# Patient Record
Sex: MALE | Race: BLACK OR AFRICAN AMERICAN | Employment: OTHER | URBAN - METROPOLITAN AREA
[De-identification: names, ages, dates, MRNs, and addresses within clinical notes are randomized per-mention and may not be internally consistent; named-entity substitution may affect disease eponyms.]

---

## 2017-01-18 DIAGNOSIS — Z85.46 HISTORY OF PROSTATE CANCER: Primary | ICD-10-CM

## 2017-01-18 NOTE — TELEPHONE ENCOUNTER
TAMSULOSIN 0.4MG 10/30/15         Last Written Prescription Date: 10/30/16  Last Fill Quantity: 90, # refills: 4    Last Office Visit with [facility], [facility] or [facility] prescribing provider:  8/17/16   Future Office Visit:      BP Readings from Last 3 Encounters:   10/31/16 138/70   08/17/16 120/60   06/15/16 114/66

## 2017-01-20 RX ORDER — TAMSULOSIN HYDROCHLORIDE 0.4 MG/1
CAPSULE ORAL
Qty: 90 CAPSULE | Refills: 1 | Status: SHIPPED | OUTPATIENT
Start: 2017-01-20 | End: 2017-05-30

## 2017-02-06 DIAGNOSIS — E78.5 HYPERLIPIDEMIA LDL GOAL <130: Primary | ICD-10-CM

## 2017-02-06 NOTE — TELEPHONE ENCOUNTER
simvastatin (ZOCOR) 20 MG tablet     Last Written Prescription Date: 11/7/16  Last Fill Quantity: 90, # refills: 3  Last Office Visit with [facility], [facility] or [facility] prescribing provider: 8/17/16       CHOL      110   8/20/2016  HDL       36   8/20/2016  LDL       43   8/20/2016  LDL     52.8   10/18/2011  TRIG      156   8/20/2016  CHOLHDLRATIO      3.3   10/30/2015

## 2017-02-07 RX ORDER — SIMVASTATIN 20 MG
20 TABLET ORAL AT BEDTIME
Qty: 90 TABLET | Refills: 1 | Status: SHIPPED | OUTPATIENT
Start: 2017-02-07 | End: 2017-12-27

## 2017-05-09 DIAGNOSIS — I50.22 CHRONIC SYSTOLIC CONGESTIVE HEART FAILURE (H): ICD-10-CM

## 2017-05-09 RX ORDER — TORSEMIDE 10 MG/1
10 TABLET ORAL DAILY
Qty: 90 TABLET | Refills: 1 | Status: SHIPPED | OUTPATIENT
Start: 2017-05-09 | End: 2018-08-27

## 2017-05-09 NOTE — TELEPHONE ENCOUNTER
Received refill request for:  torsemide  Last OV was: 10/31/2016 with Dr. Mcclain  Labs/EKG: last BMP 8/20/2016  F/U scheduled: orders in Epic for 10/2017  New script sent to: [facility]

## 2017-05-30 ENCOUNTER — OFFICE VISIT (OUTPATIENT)
Dept: CARDIOLOGY | Facility: CLINIC | Age: 82
End: 2017-05-30
Payer: COMMERCIAL

## 2017-05-30 VITALS
DIASTOLIC BLOOD PRESSURE: 70 MMHG | OXYGEN SATURATION: 99 % | HEART RATE: 70 BPM | HEIGHT: 68 IN | SYSTOLIC BLOOD PRESSURE: 118 MMHG | WEIGHT: 170.8 LBS | BODY MASS INDEX: 25.88 KG/M2

## 2017-05-30 DIAGNOSIS — I50.22 CHRONIC SYSTOLIC CONGESTIVE HEART FAILURE (H): ICD-10-CM

## 2017-05-30 DIAGNOSIS — I50.22 CHRONIC SYSTOLIC HEART FAILURE (H): Primary | ICD-10-CM

## 2017-05-30 LAB
ANION GAP SERPL CALCULATED.3IONS-SCNC: 14.9 MMOL/L (ref 6–17)
BUN SERPL-MCNC: 14 MG/DL (ref 7–30)
CALCIUM SERPL-MCNC: 9.1 MG/DL (ref 8.5–10.5)
CHLORIDE SERPL-SCNC: 106 MMOL/L (ref 98–107)
CO2 SERPL-SCNC: 25 MMOL/L (ref 23–29)
CREAT SERPL-MCNC: 1.22 MG/DL (ref 0.7–1.3)
GFR SERPL CREATININE-BSD FRML MDRD: 56 ML/MIN/1.7M2
GLUCOSE SERPL-MCNC: 144 MG/DL (ref 70–105)
POTASSIUM SERPL-SCNC: 3.9 MMOL/L (ref 3.5–5.1)
SODIUM SERPL-SCNC: 142 MMOL/L (ref 136–145)

## 2017-05-30 PROCEDURE — 99214 OFFICE O/P EST MOD 30 MIN: CPT | Performed by: NURSE PRACTITIONER

## 2017-05-30 PROCEDURE — 80048 BASIC METABOLIC PNL TOTAL CA: CPT | Performed by: NURSE PRACTITIONER

## 2017-05-30 PROCEDURE — 36415 COLL VENOUS BLD VENIPUNCTURE: CPT | Performed by: NURSE PRACTITIONER

## 2017-05-30 NOTE — PROGRESS NOTES
HISTORY OF PRESENT ILLNESS:  Sylvester Dvais is an 85-year-old gentleman who is here today for a 6-month followup of his congestive heart failure.  He has a history of:   1.  Paroxysmal atrial fibrillation.   2   Heart failure with preserved ejection fraction   3.  Intracerebral bleed, unable to use warfarin therapy for thromboembolic prophylaxis.   4.  History of syncopal episode thought to be due to bradycardia, but pacemaker implantation was not pursued due to the patient's declining memory.        Today I am seeing the patient in clinic as the son wanted the patient to be seen in a 6 month period. Today, Sylvester displayed short-term memory problems.  He believes he still lives in [place].  He believes that he is still living with his wife as his wife has  7 years ago and he has been living with his son for all that time.  Today both Sylvester and his son deny any worsening shortness of breath.  Sylvester is able to take a flight of stairs from his bedroom to the bathroom without any worsening shortness of breath.  Likewise, he denies chest pain or chest pressure.  There has been no more syncopal episodes.  Sylvester denies any new lower ankle swelling.  His son reports that his father is able to sleep with the head of the bed down without any breathlessness.      VITAL SIGNS:  Blood pressure in our office today looks stable at 118/70, pulse 70, weight is 170 which is stable but down 12 pounds, 11 pounds from a year ago.      LABORATORY STUDIES:  Lab work done today:  Sodium 142, potassium 3.9, BUN 14, creatinine 1.2 which is stable.      PHYSICAL EXAMINATION:   GENERAL:  The patient is alert, oriented to person.   CARDIAC:  Heart tones are irregular with a rate of 70 beats per minute.   LUNGS:  Clear without crackles or wheezes.   NECK:  No jugular venous distention.  Negative for carotid bruits.   ABDOMEN:  Soft.   LOWER EXTREMITIES:  Trace bilateral lower ankle edema.      IMPRESSION:     1.  The patient  with stable paroxysmal atrial fibrillation and flutter.  He has a CHADS-VASc score of greater than 4 which puts him at high risk for thromboembolic complications from the atrial fibrillation.  However, anticoagulation therapy has been essentially contraindicated due to his intracerebral bleed.  He will continue with low-dose aspirin   2.  Moderate aortic stenosis.  He remains asymptomatic with this.   3.  History of hypertension.  His blood pressure looks well managed.   4.  Hyperlipidemia.  His lipids are being followed by his primary care physician.  He will be due in 2017.        The Watchman device may be an alternative for this patient to decrease his risk for thromboembolic complications, but his son is not interested in pursuing this which seems very reasonable.  I will set Medardo up to be followed with Dr. Mcclain in the fall of .  It has been my pleasure to be involved in his care.         FARHAN العراقي, CNP             D: 2017 14:47   T: 2017 15:50   MT: BEST      Name:     LEONCIO CORDERO   MRN:      [id number]        Account:      [id number]   :      [id number]           Service Date: 2017      Document: [id number]

## 2017-05-30 NOTE — PROGRESS NOTES
HPI and Plan:   See dictation    No orders of the defined types were placed in this encounter.      Orders Placed This Encounter   Medications     OXYBUTYNIN CHLORIDE PO     Sig: Take 5 mg by mouth daily       Medications Discontinued During This Encounter   Medication Reason     memantine (NAMENDA) 10 MG tablet Erroneous Entry     oxybutynin (DITROPAN) 5 MG tablet Dose adjustment     oxybutynin (DITROPAN) 5 MG tablet Dose adjustment     tamsulosin (FLOMAX) 0.4 MG capsule Erroneous Entry         Encounter Diagnosis   Name Primary?     Chronic systolic heart failure (H) Yes       CURRENT MEDICATIONS:  Current Outpatient Prescriptions   Medication Sig Dispense Refill     OXYBUTYNIN CHLORIDE PO Take 5 mg by mouth daily       torsemide (DEMADEX) 10 MG tablet Take 1 tablet (10 mg) by mouth daily 90 tablet 1     simvastatin (ZOCOR) 20 MG tablet Take 1 tablet (20 mg) by mouth At Bedtime 90 tablet 1     metoprolol (TOPROL-XL) 25 MG 24 hr tablet TAKE ONE-HALF TABLET BY MOUTH DAILY 45 tablet 2     DIGOX 125 MCG tablet TAKE 1 TABLET BY MOUTH EVERY DAY 90 tablet 1     memantine (NAMENDA) 10 MG tablet TAKE 1 TABLET BY MOUTH TWICE DAILY 180 tablet 3     tamsulosin (FLOMAX) 0.4 MG 24 hr capsule Take 1 capsule (0.4 mg) by mouth daily 90 capsule 4     aspirin 81 MG tablet Take 81 mg by mouth daily.         ALLERGIES     Allergies   Allergen Reactions     Fish Allergy      Coumadin        PAST MEDICAL HISTORY:  Past Medical History:   Diagnosis Date     Aortic valve disorders     moderate aortic stenosis & 1+ AR per 8/2014 echo     Atrial fibrillation, chronic (H)     Sees cardiologist for meds and testing such as digoxin med/levels      Atrial flutter (H)      Cardiomyopathy (H)     resolved     CHF (congestive heart failure) (H) 10/26/2012     Dementia     chronic memory loss, son saima states he is poa     History of prostate cancer 10/26/2012     Hyperlipidemia LDL goal < 130      Hypertension      Intracranial bleed (H)     while  on coumadin     LVH (left ventricular hypertrophy)     mild to moderate per 8/2014 echo     Memory loss, long term 10/26/2012    chronic memory loss, son saima states he is poa     Mitral regurgitation     1+ per 8/2014 echo     Sick sinus syndrome (H)      Unspecified cerebral artery occlusion with cerebral infarction      Urinary retention        PAST SURGICAL HISTORY:  Past Surgical History:   Procedure Laterality Date     GENITOURINARY SURGERY  2010    Resection of prostate     HEAD & NECK SURGERY  2009    Brain swelling - stopped coumadin but kept on aspirin per cardiology     wisdom teeth removal         FAMILY HISTORY:  Family History   Problem Relation Age of Onset     CANCER Mother        SOCIAL HISTORY:  Social History     Social History     Marital status:      Spouse name: N/A     Number of children: N/A     Years of education: N/A     Occupational History      Retired     [employer]     Social History Main Topics     Smoking status: Former Smoker     Packs/day: 1.00     Years: 10.00     Types: Cigarettes     Smokeless tobacco: Never Used     Alcohol use No     Drug use: No     Sexual activity: No     Other Topics Concern     Parent/Sibling W/ Cabg, Mi Or Angioplasty Before 65f 55m? No     Exercise Yes     cycling      Seat Belt Yes     Social History Narrative       Review of Systems:  Skin:  Negative for rash     Eyes:  Negative for visual blurring;double vision    ENT:  Negative for nasal congestion    Respiratory:  Negative for shortness of breath;cough;wheezing     Cardiovascular:  Negative for;palpitations;chest pain;lightheadedness;dizziness;edema Positive for;fatigue    Gastroenterology: Negative for poor appetite    Genitourinary:  Negative      Musculoskeletal:  Positive for arthritis uses cane  Neurologic:  Positive for memory problems    Psychiatric:  Negative for sleep disturbances    Heme/Lymph/Imm:  Negative chills;fever    Endocrine:  Negative for thyroid disorder;diabetes  "     Physical Exam:  Vitals: /70 (BP Location: Left arm, Patient Position: Chair, Cuff Size: Adult Regular)  Pulse 70  Ht 1.727 m (5' 8\")  Wt 77.5 kg (170 lb 12.8 oz)  SpO2 99%  BMI 25.97 kg/m2    Constitutional:  alert and oriented        Skin:  warm and dry to the touch        Head:           Eyes:  not assessed this visit        ENT:  no pallor or cyanosis        Neck:  carotid pulses are full and equal bilaterally        Chest:  clear to auscultation;normal breath sounds, clear to auscultation, normal A-P diameter, normal symmetry, normal respiratory excursion, no use of accessory muscles          Cardiac: normal S1 and S2 irregular rhythm           S1 normal, S2 normal, S1,S2, no S3 or S4 present, III/VI systolic murmur heard over the aortic area    Abdomen:  abdomen soft        Vascular:                                          Extremities and Back:      bilateral LE edema;trace          Neurological:  oriented to time, person and place              CC  No referring provider defined for this encounter.              "

## 2017-05-30 NOTE — PATIENT INSTRUCTIONS
Call CORE nurse for any questions or concerns:  [phone]   *If you have concerns after hours, please call [phone], option 2 to speak with on call Cardiologist.    1. Medication changes from today:  No medication changes    See Dr. Mcclain in 6 months       2. Weigh yourself daily and write it down.     3. Call CORE nurse if your weight is up more than 2 pounds in one day or 5 pounds in one week.     4. Call CORE nurse if you feel more short of breath, have more abdominal bloating, or leg swelling.     5. Continue low sodium diet (less than 2000 mg daily). If you eat less salt, you will retain less fluid.     6. Alcohol can weaken your heart further. You should avoid alcohol or limit its use to special times, such as a holiday or birthday.      7. Do NOT take Aleve or ibuprofen without talking to your doctor first.      8. Lab Results:   Component      Latest Ref Rng & Units 5/30/2017   Sodium      136 - 145 mmol/L 142   Potassium      3.5 - 5.1 mmol/L 3.9   Chloride      98 - 107 mmol/L 106   Carbon Dioxide      23 - 29 mmol/L 25   Anion Gap      6 - 17 mmol/L 14.9   Glucose      70 - 105 mg/dL 144 (H)   Urea Nitrogen      7 - 30 mg/dL 14   Creatinine      0.70 - 1.30 mg/dL 1.22   GFR Estimate      >60 mL/min/1.7m2 56 (L)   GFR Estimate If Black      >60 mL/min/1.7m2 68   Calcium      8.5 - 10.5 mg/dL 9.1        [facility]: Cardiomyopathy, Optimization, Rehabilitation, Education  The [facility] is a heart failure specialty clinic within the [facility] where you will work with specialized nurse practitioners, physician assistants, doctors, and registered nurses. They are dedicated to helping patients with heart failure to carefully adjust medications, receive education, and learn who and when to call if symptoms develop. They specialize in helping you better understand your condition, slow the progression of your disease, improve the length and quality of your life, help you detect future heart  problems before they become life threatening, and avoid hospitalizations.

## 2017-05-30 NOTE — MR AVS SNAPSHOT
After Visit Summary   5/30/2017    Sylvester Davis    MRN: [id number]           Patient Information     Date Of Birth          12/3/1931        Visit Information        Provider Department      5/30/2017 1:10 PM Alia Hernandez, FARHAN MEDLEY [facility]ART AT [place]        Today's Diagnoses     Chronic systolic heart failure (H)    -  1      Care Instructions    Call CORE nurse for any questions or concerns:  [phone]   *If you have concerns after hours, please call [phone], option 2 to speak with on call Cardiologist.    1. Medication changes from today:  No medication changes    See Dr. Mcclain in 6 months       2. Weigh yourself daily and write it down.     3. Call CORE nurse if your weight is up more than 2 pounds in one day or 5 pounds in one week.     4. Call CORE nurse if you feel more short of breath, have more abdominal bloating, or leg swelling.     5. Continue low sodium diet (less than 2000 mg daily). If you eat less salt, you will retain less fluid.     6. Alcohol can weaken your heart further. You should avoid alcohol or limit its use to special times, such as a holiday or birthday.      7. Do NOT take Aleve or ibuprofen without talking to your doctor first.      8. Lab Results:   Component      Latest Ref Rng & Units 5/30/2017   Sodium      136 - 145 mmol/L 142   Potassium      3.5 - 5.1 mmol/L 3.9   Chloride      98 - 107 mmol/L 106   Carbon Dioxide      23 - 29 mmol/L 25   Anion Gap      6 - 17 mmol/L 14.9   Glucose      70 - 105 mg/dL 144 (H)   Urea Nitrogen      7 - 30 mg/dL 14   Creatinine      0.70 - 1.30 mg/dL 1.22   GFR Estimate      >60 mL/min/1.7m2 56 (L)   GFR Estimate If Black      >60 mL/min/1.7m2 68   Calcium      8.5 - 10.5 mg/dL 9.1        [facility]: Cardiomyopathy, Optimization, Rehabilitation, Education  The [facility] is a heart failure specialty clinic within the [facility] where you will work with specialized nurse  "practitioners, physician assistants, doctors, and registered nurses. They are dedicated to helping patients with heart failure to carefully adjust medications, receive education, and learn who and when to call if symptoms develop. They specialize in helping you better understand your condition, slow the progression of your disease, improve the length and quality of your life, help you detect future heart problems before they become life threatening, and avoid hospitalizations.            Follow-ups after your visit        Your next 10 appointments already scheduled     Jun 05, 2017  1:15 PM CDT   SHORT with Manuelito Ansari MD   [facility]    [address] [id number] [phone]              Who to contact     If you have questions or need follow up information about today's clinic visit or your schedule please contact [facility] PHYS[facility] HE[facility] AT [place] directly at [phone].  Normal or non-critical lab and imaging results will be communicated to you by MyChart, letter or phone within 4 business days after the clinic has received the results. If you do not hear from us within 7 days, please contact the clinic through [employer] or phone. If you have a critical or abnormal lab result, we will notify you by phone as soon as possible.  Submit refill requests through [employer] or call your pharmacy and they will forward the refill request to us. Please allow 3 business days for your refill to be completed.          Additional Information About Your Visit        [employer] Information     [employer] lets you send messages to your doctor, view your test results, renew your prescriptions, schedule appointments and more. To sign up, go to [email or web address] . Click on \"Log in\" on the left side of the screen, which will take you to the Welcome page. Then click on \"Sign up Now\" on the right " "side of the page.     You will be asked to enter the access code listed below, as well as some personal information. Please follow the directions to create your username and password.     Your access code is: [email or web address]  Expires: 2017  2:07 PM     Your access code will  in 90 days. If you need help or a new code, please call your [place] clinic or [phone].        Care EveryWhere ID     This is your Care EveryWhere ID. This could be used by other organizations to access your [place] medical records  [id number]        Your Vitals Were     Pulse Height Pulse Oximetry BMI (Body Mass Index)          70 1.727 m (5' 8\") 99% 25.97 kg/m2         Blood Pressure from Last 3 Encounters:   17 118/70   10/31/16 138/70   16 120/60    Weight from Last 3 Encounters:   17 77.5 kg (170 lb 12.8 oz)   10/31/16 76.7 kg (169 lb)   16 79.8 kg (176 lb)              Today, you had the following     No orders found for display         Today's Medication Changes          These changes are accurate as of: 17  2:09 PM.  If you have any questions, ask your nurse or doctor.               These medicines have changed or have updated prescriptions.        Dose/Directions    OXYBUTYNIN CHLORIDE PO   This may have changed:  Another medication with the same name was removed. Continue taking this medication, and follow the directions you see here.   Changed by:  Alia Hernandez, APRN CNP        Dose:  5 mg   Take 5 mg by mouth daily   Refills:  0                Primary Care Provider Office Phone # Fax #    Manuelito Ansari [phone]       [facility] [address]  [place] [address]        Thank you!     Thank you for choosing [facility]YS[facility] HE[facility] AT [place]  for your care. Our goal is always to provide you with excellent care. Hearing back from our patients is one way we can continue to improve our services. Please take a few minutes to " complete the written survey that you may receive in the mail after your visit with us. Thank you!             Your Updated Medication List - Protect others around you: Learn how to safely use, store and throw away your medicines at www.disposemymeds.org.          This list is accurate as of: 5/30/17  2:09 PM.  Always use your most recent med list.                   Brand Name Dispense Instructions for use    aspirin 81 MG tablet      Take 81 mg by mouth daily.       DIGOX 125 MCG tablet   Generic drug:  digoxin     90 tablet    TAKE 1 TABLET BY MOUTH EVERY DAY       memantine 10 MG tablet    NAMENDA    180 tablet    TAKE 1 TABLET BY MOUTH TWICE DAILY       metoprolol 25 MG 24 hr tablet    TOPROL-XL    45 tablet    TAKE ONE-HALF TABLET BY MOUTH DAILY       OXYBUTYNIN CHLORIDE PO      Take 5 mg by mouth daily       simvastatin 20 MG tablet    ZOCOR    90 tablet    Take 1 tablet (20 mg) by mouth At Bedtime       tamsulosin 0.4 MG capsule    FLOMAX    90 capsule    Take 1 capsule (0.4 mg) by mouth daily       torsemide 10 MG tablet    DEMADEX    90 tablet    Take 1 tablet (10 mg) by mouth daily

## 2017-05-30 NOTE — LETTER
2017    Manuelito Ansari MD  [address]  [facility] [address]      RE: Sylvester Davis       Dear Colleague,    I had the pleasure of seeing Sylvester Davis in the [facility].    Sylvester Davis is an 85-year-old gentleman who is here today for a 6-month followup of his congestive heart failure.  He has a history of:   1.  Paroxysmal atrial fibrillation.   2   Heart failure with preserved ejection fraction   3.  Intracerebral bleed, unable to use warfarin therapy for thromboembolic prophylaxis.   4.  History of syncopal episode thought to be due to bradycardia, but pacemaker implantation was not pursued due to the patient's declining memory.        Today I am seeing the patient in clinic as the son wanted the patient to be seen in a 6 month period. Today, Sylvester displayed short-term memory problems.  He believes he still lives in [place].  He believes that he is still living with his wife as his wife has  7 years ago and he has been living with his son for all that time.  Today both Sylvester and his son deny any worsening shortness of breath.  Sylvester is able to take a flight of stairs from his bedroom to the bathroom without any worsening shortness of breath.  Likewise, he denies chest pain or chest pressure.  There has been no more syncopal episodes.  Sylvester denies any new lower ankle swelling.  His son reports that his father is able to sleep with the head of the bed down without any breathlessness.      VITAL SIGNS:  Blood pressure in our office today looks stable at 118/70, pulse 70, weight is 170 which is stable but down 12 pounds, 11 pounds from a year ago.      LABORATORY STUDIES:  Lab work done today:  Sodium 142, potassium 3.9, BUN 14, creatinine 1.2 which is stable.      PHYSICAL EXAMINATION:   GENERAL:  The patient is alert, oriented to person.   CARDIAC:  Heart tones are irregular with a rate of 70 beats per minute.   LUNGS:   Clear without crackles or wheezes.   NECK:  No jugular venous distention.  Negative for carotid bruits.   ABDOMEN:  Soft.   LOWER EXTREMITIES:  Trace bilateral lower ankle edema.     Outpatient Encounter Prescriptions as of 5/30/2017   Medication Sig Dispense Refill     OXYBUTYNIN CHLORIDE PO Take 5 mg by mouth daily       torsemide (DEMADEX) 10 MG tablet Take 1 tablet (10 mg) by mouth daily 90 tablet 1     simvastatin (ZOCOR) 20 MG tablet Take 1 tablet (20 mg) by mouth At Bedtime 90 tablet 1     metoprolol (TOPROL-XL) 25 MG 24 hr tablet TAKE ONE-HALF TABLET BY MOUTH DAILY 45 tablet 2     [DISCONTINUED] DIGOX 125 MCG tablet TAKE 1 TABLET BY MOUTH EVERY DAY 90 tablet 1     memantine (NAMENDA) 10 MG tablet TAKE 1 TABLET BY MOUTH TWICE DAILY 180 tablet 3     tamsulosin (FLOMAX) 0.4 MG 24 hr capsule Take 1 capsule (0.4 mg) by mouth daily 90 capsule 4     aspirin 81 MG tablet Take 81 mg by mouth daily.       [DISCONTINUED] tamsulosin (FLOMAX) 0.4 MG capsule TAKE ONE CAPSULE BY MOUTH EVERY DAY 90 capsule 1     [DISCONTINUED] memantine (NAMENDA) 10 MG tablet TAKE 1 TABLET BY MOUTH TWICE DAILY 180 tablet 3     [DISCONTINUED] oxybutynin (DITROPAN) 5 MG tablet TAKE 1 TABLET BY MOUTH TWICE DAILY 180 tablet 2     [DISCONTINUED] oxybutynin (DITROPAN) 5 MG tablet Take 1 tablet (5 mg) by mouth 2 times daily 180 tablet 3     No facility-administered encounter medications on file as of 5/30/2017.       IMPRESSION:     1.  The patient with stable paroxysmal atrial fibrillation and flutter.  He has a CHADS-VASc score of greater than 4 which puts him at high risk for thromboembolic complications from the atrial fibrillation.  However, anticoagulation therapy has been essentially contraindicated due to his intracerebral bleed.  He will continue with low-dose aspirin   2.  Moderate aortic stenosis.  He remains asymptomatic with this.   3.  History of hypertension.  His blood pressure looks well managed.   4.  Hyperlipidemia.  His lipids  are being followed by his primary care physician.  He will be due in 08/2017.     The Watchman device may be an alternative for this patient to decrease his risk for thromboembolic complications, but his son is not interested in pursuing this which seems very reasonable.  I will set Medardo up to be followed with Dr. Mcclain in the fall of 2017.  It has been my pleasure to be involved in his care.     Again, thank you for allowing me to participate in the care of your patient.      Sincerely,    FARHAN Reddy CNP     [facility]

## 2017-06-05 ENCOUNTER — OFFICE VISIT (OUTPATIENT)
Dept: FAMILY MEDICINE | Facility: CLINIC | Age: 82
End: 2017-06-05
Payer: COMMERCIAL

## 2017-06-05 VITALS
DIASTOLIC BLOOD PRESSURE: 60 MMHG | TEMPERATURE: 97 F | HEIGHT: 68 IN | RESPIRATION RATE: 16 BRPM | SYSTOLIC BLOOD PRESSURE: 126 MMHG | OXYGEN SATURATION: 98 % | HEART RATE: 56 BPM | BODY MASS INDEX: 26.83 KG/M2 | WEIGHT: 177 LBS

## 2017-06-05 DIAGNOSIS — I48.92 ATRIAL FLUTTER, UNSPECIFIED TYPE (H): ICD-10-CM

## 2017-06-05 DIAGNOSIS — I10 ESSENTIAL HYPERTENSION: Chronic | ICD-10-CM

## 2017-06-05 DIAGNOSIS — E78.2 MIXED HYPERLIPIDEMIA: Primary | ICD-10-CM

## 2017-06-05 DIAGNOSIS — I62.9 INTRACRANIAL BLEED (H): ICD-10-CM

## 2017-06-05 DIAGNOSIS — R73.9 HYPERGLYCEMIA: ICD-10-CM

## 2017-06-05 DIAGNOSIS — I50.22 CHRONIC SYSTOLIC HEART FAILURE (H): ICD-10-CM

## 2017-06-05 PROCEDURE — 99214 OFFICE O/P EST MOD 30 MIN: CPT | Performed by: FAMILY MEDICINE

## 2017-06-05 NOTE — NURSING NOTE
"Chief Complaint   Patient presents with     Recheck Medication     Hypertension       Initial /60  Pulse 56  Temp 97  F (36.1  C) (Tympanic)  Resp 16  Ht 5' 8\" (1.727 m)  Wt 177 lb (80.3 kg)  SpO2 98%  BMI 26.91 kg/m2 Estimated body mass index is 26.91 kg/(m^2) as calculated from the following:    Height as of this encounter: 5' 8\" (1.727 m).    Weight as of this encounter: 177 lb (80.3 kg).  Medication Reconciliation: complete     Екатерина Ashraf CMA      "

## 2017-06-05 NOTE — PROGRESS NOTES
SUBJECTIVE:                                                    Sylvester Davis is a 85 year old male who presents to clinic today for the following health issues:      Hyperlipidemia Follow-Up      Rate your low fat/cholesterol diet?: good    Taking statin?  Yes, no muscle aches from statin    Other lipid medications/supplements?:  none     Hypertension Follow-up      Outpatient blood pressures are not being checked.    Low Salt Diet: no added salt       Amount of exercise or physical activity: 1 day a week    Problems taking medications regularly: No    Medication side effects: none    Diet: regular (no restrictions)      dementia      Duration: years    Description (location/character/radiation): n/a    Intensity:  moderate    Accompanying signs and symptoms: none    History (similar episodes/previous evaluation): None    Precipitating or alleviating factors: Roxane maybe has but his son says that he has been slipping of late.  There have not been any behavioral issues.    Therapies tried and outcome: See above        Problem list and histories reviewed & adjusted, as indicated.  Additional history: as documented    Patient Active Problem List   Diagnosis     History of prostate cancer     Short-term memory loss     Atrial fibrillation, chronic (H)     Dementia     Hyperlipidemia LDL goal <130     Anemia     Cerebral artery occlusion with cerebral infarction (H)     Aortic valve disorder     Mitral regurgitation     LVH (left ventricular hypertrophy)     Intracranial bleed (H)     Sick sinus syndrome (H)     Atrial flutter (H)     Cardiomyopathy (H)     Essential hypertension     CKD (chronic kidney disease) stage 3, GFR 30-59 ml/min     Chronic systolic heart failure (H)     Past Surgical History:   Procedure Laterality Date     GENITOURINARY SURGERY  2010    Resection of prostate     HEAD & NECK SURGERY  2009    Brain swelling - stopped coumadin but kept on aspirin per cardiology     wisdom teeth  "removal         Social History   Substance Use Topics     Smoking status: Former Smoker     Packs/day: 1.00     Years: 10.00     Types: Cigarettes     Smokeless tobacco: Never Used     Alcohol use No     Family History   Problem Relation Age of Onset     CANCER Mother            Reviewed and updated as needed this visit by clinical staff  Tobacco  Allergies  Meds  Med Hx  Surg Hx  Fam Hx  Soc Hx      Reviewed and updated as needed this visit by Provider         ROS:  CONSTITUTIONAL:NEGATIVE for fever, chills, change in weight  RESP:NEGATIVE for significant cough or SOB  CV: NEGATIVE for chest pain, palpitations or peripheral edema  NEURO: NEGATIVE for weakness, dizziness or paresthesias and POSITIVE for memory problems  PSYCHIATRIC: NEGATIVE for changes in mood or affect    OBJECTIVE:                                                    /60  Pulse 56  Temp 97  F (36.1  C) (Tympanic)  Resp 16  Ht 5' 8\" (1.727 m)  Wt 177 lb (80.3 kg)  SpO2 98%  BMI 26.91 kg/m2  Body mass index is 26.91 kg/(m^2).  GENERAL APPEARANCE: healthy, alert and no distress    Diagnostic test results:  Results for orders placed or performed in visit on 05/30/17   Basic metabolic panel   Result Value Ref Range    Sodium 142 136 - 145 mmol/L    Potassium 3.9 3.5 - 5.1 mmol/L    Chloride 106 98 - 107 mmol/L    Carbon Dioxide 25 23 - 29 mmol/L    Anion Gap 14.9 6 - 17 mmol/L    Glucose 144 (H) 70 - 105 mg/dL    Urea Nitrogen 14 7 - 30 mg/dL    Creatinine 1.22 0.70 - 1.30 mg/dL    GFR Estimate 56 (L) >60 mL/min/1.7m2    GFR Estimate If Black 68 >60 mL/min/1.7m2    Calcium 9.1 8.5 - 10.5 mg/dL        ASSESSMENT/PLAN:                                                        ICD-10-CM    1. Mixed hyperlipidemia E78.2 Lipid Profile   2. Intracranial bleed (H) I62.9    3. Atrial flutter, unspecified type (H) I48.92    4. Chronic systolic heart failure (H) I50.22    5. Essential hypertension I10 UA with Microscopic     CBC with platelets " differential   6. Hyperglycemia R73.9 Glucose       Patient Instructions   The patient just recently had blood tests done at [facility].  He was not fasting at the time and had an elevated blood sugar 144.  They did not do his cholesterol numbers.  The remainder of his tests are as noted above in the note.  He has no appointment this fall to go back to see Dr. Mcclain.  Things are going well with him living with his son.  As noted above there are no behavioral type issues at present.  His medications are as noted in he will need refills probably coming up in a couple of weeks but doesn't want to get them today.      Manuelito Ansari MD  [facility]

## 2017-06-05 NOTE — MR AVS SNAPSHOT
After Visit Summary   6/5/2017    Sylvester Davis    MRN: [id number]           Patient Information     Date Of Birth          12/3/1931        Visit Information        Provider Department      6/5/2017 1:15 PM Manuelito Ansari MD [facility]        Today's Diagnoses     Mixed hyperlipidemia    -  1    Intracranial bleed (H)        Atrial flutter, unspecified type (H)        Chronic systolic heart failure (H)        Essential hypertension        Hyperglycemia          Care Instructions    The patient just recently had blood tests done at [facility] heart.  He was not fasting at the time and had an elevated blood sugar 144.  They did not do his cholesterol numbers.  The remainder of his tests are as noted above in the note.  He has no appointment this fall to go back to see Dr. Mcclain.  Things are going well with him living with his son.  As noted above there are no behavioral type issues at present.  His medications are as noted in he will need refills probably coming up in a couple of weeks but doesn't want to get them today.          Follow-ups after your visit        Who to contact     If you have questions or need follow up information about today's clinic visit or your schedule please contact [facility] directly at [phone].  Normal or non-critical lab and imaging results will be communicated to you by MyChart, letter or phone within 4 business days after the clinic has received the results. If you do not hear from us within 7 days, please contact the clinic through MyChart or phone. If you have a critical or abnormal lab result, we will notify you by phone as soon as possible.  Submit refill requests through [employer] or call your pharmacy and they will forward the refill request to us. Please allow 3 business days for your refill to be completed.          Additional Information About Your Visit        Care EveryWhere ID     This is  "your Care EveryWhere ID. This could be used by other organizations to access your [place] medical records  [id number]        Your Vitals Were     Pulse Temperature Respirations Height Pulse Oximetry BMI (Body Mass Index)    56 97  F (36.1  C) (Tympanic) 16 5' 8\" (1.727 m) 98% 26.91 kg/m2       Blood Pressure from Last 3 Encounters:   06/05/17 126/60   05/30/17 118/70   10/31/16 138/70    Weight from Last 3 Encounters:   06/05/17 177 lb (80.3 kg)   05/30/17 170 lb 12.8 oz (77.5 kg)   10/31/16 169 lb (76.7 kg)               Primary Care Provider Office Phone # Fax #    Manuelito Ansari [phone]       [facility] [address] [place] [address]        Thank you!     Thank you for choosing [facility]  for your care. Our goal is always to provide you with excellent care. Hearing back from our patients is one way we can continue to improve our services. Please take a few minutes to complete the written survey that you may receive in the mail after your visit with us. Thank you!             Your Updated Medication List - Protect others around you: Learn how to safely use, store and throw away your medicines at www.disposemymeds.org.          This list is accurate as of: 6/5/17 11:59 PM.  Always use your most recent med list.                   Brand Name Dispense Instructions for use    aspirin 81 MG tablet      Take 81 mg by mouth daily.       DIGOX 125 MCG tablet   Generic drug:  digoxin     90 tablet    TAKE 1 TABLET BY MOUTH EVERY DAY       memantine 10 MG tablet    NAMENDA    180 tablet    TAKE 1 TABLET BY MOUTH TWICE DAILY       metoprolol 25 MG 24 hr tablet    TOPROL-XL    45 tablet    TAKE ONE-HALF TABLET BY MOUTH DAILY       OXYBUTYNIN CHLORIDE PO      Take 5 mg by mouth daily       simvastatin 20 MG tablet    ZOCOR    90 tablet    Take 1 tablet (20 mg) by mouth At Bedtime       tamsulosin 0.4 MG capsule    FLOMAX    90 capsule    Take " 1 capsule (0.4 mg) by mouth daily       torsemide 10 MG tablet    DEMADEX    90 tablet    Take 1 tablet (10 mg) by mouth daily

## 2017-06-05 NOTE — PATIENT INSTRUCTIONS
The patient just recently had blood tests done at [facility] heart.  He was not fasting at the time and had an elevated blood sugar 144.  They did not do his cholesterol numbers.  The remainder of his tests are as noted above in the note.  He has no appointment this fall to go back to see Dr. Mcclain.  Things are going well with him living with his son.  As noted above there are no behavioral type issues at present.  His medications are as noted in he will need refills probably coming up in a couple of weeks but doesn't want to get them today.

## 2017-06-10 DIAGNOSIS — E78.2 MIXED HYPERLIPIDEMIA: ICD-10-CM

## 2017-06-10 DIAGNOSIS — I10 ESSENTIAL HYPERTENSION: Chronic | ICD-10-CM

## 2017-06-10 DIAGNOSIS — R73.9 HYPERGLYCEMIA: ICD-10-CM

## 2017-06-10 LAB
ALBUMIN UR-MCNC: NEGATIVE MG/DL
APPEARANCE UR: CLEAR
BASOPHILS # BLD AUTO: 0 10E9/L (ref 0–0.2)
BASOPHILS NFR BLD AUTO: 0.2 %
BILIRUB UR QL STRIP: NEGATIVE
CHOLEST SERPL-MCNC: 125 MG/DL
COLOR UR AUTO: YELLOW
DIFFERENTIAL METHOD BLD: ABNORMAL
EOSINOPHIL # BLD AUTO: 0.2 10E9/L (ref 0–0.7)
EOSINOPHIL NFR BLD AUTO: 3.3 %
ERYTHROCYTE [DISTWIDTH] IN BLOOD BY AUTOMATED COUNT: 12.5 % (ref 10–15)
GLUCOSE SERPL-MCNC: 101 MG/DL (ref 70–99)
GLUCOSE UR STRIP-MCNC: NEGATIVE MG/DL
HCT VFR BLD AUTO: 36.2 % (ref 40–53)
HDLC SERPL-MCNC: 32 MG/DL
HGB BLD-MCNC: 11.9 G/DL (ref 13.3–17.7)
HGB UR QL STRIP: NEGATIVE
KETONES UR STRIP-MCNC: NEGATIVE MG/DL
LDLC SERPL CALC-MCNC: 50 MG/DL
LEUKOCYTE ESTERASE UR QL STRIP: ABNORMAL
LYMPHOCYTES # BLD AUTO: 1.2 10E9/L (ref 0.8–5.3)
LYMPHOCYTES NFR BLD AUTO: 25.8 %
MCH RBC QN AUTO: 30.2 PG (ref 26.5–33)
MCHC RBC AUTO-ENTMCNC: 32.9 G/DL (ref 31.5–36.5)
MCV RBC AUTO: 92 FL (ref 78–100)
MONOCYTES # BLD AUTO: 0.5 10E9/L (ref 0–1.3)
MONOCYTES NFR BLD AUTO: 9.4 %
NEUTROPHILS # BLD AUTO: 2.9 10E9/L (ref 1.6–8.3)
NEUTROPHILS NFR BLD AUTO: 61.3 %
NITRATE UR QL: NEGATIVE
NONHDLC SERPL-MCNC: 93 MG/DL
PH UR STRIP: 7 PH (ref 5–7)
PLATELET # BLD AUTO: 135 10E9/L (ref 150–450)
RBC # BLD AUTO: 3.94 10E12/L (ref 4.4–5.9)
RBC #/AREA URNS AUTO: ABNORMAL /HPF (ref 0–2)
SP GR UR STRIP: 1.01 (ref 1–1.03)
TRIGL SERPL-MCNC: 213 MG/DL
URN SPEC COLLECT METH UR: ABNORMAL
UROBILINOGEN UR STRIP-ACNC: 1 EU/DL (ref 0.2–1)
WBC # BLD AUTO: 4.8 10E9/L (ref 4–11)
WBC #/AREA URNS AUTO: ABNORMAL /HPF (ref 0–2)

## 2017-06-10 PROCEDURE — 81001 URINALYSIS AUTO W/SCOPE: CPT | Performed by: FAMILY MEDICINE

## 2017-06-10 PROCEDURE — 36415 COLL VENOUS BLD VENIPUNCTURE: CPT | Performed by: FAMILY MEDICINE

## 2017-06-10 PROCEDURE — 85025 COMPLETE CBC W/AUTO DIFF WBC: CPT | Performed by: FAMILY MEDICINE

## 2017-06-10 PROCEDURE — 80061 LIPID PANEL: CPT | Performed by: FAMILY MEDICINE

## 2017-06-10 PROCEDURE — 82947 ASSAY GLUCOSE BLOOD QUANT: CPT | Performed by: FAMILY MEDICINE

## 2017-07-14 ENCOUNTER — ALLIED HEALTH/NURSE VISIT (OUTPATIENT)
Dept: PHARMACY | Facility: CLINIC | Age: 82
End: 2017-07-14
Payer: COMMERCIAL

## 2017-07-14 DIAGNOSIS — Z85.46 HISTORY OF PROSTATE CANCER: ICD-10-CM

## 2017-07-14 DIAGNOSIS — R32 URINARY INCONTINENCE, UNSPECIFIED TYPE: ICD-10-CM

## 2017-07-14 DIAGNOSIS — F03.90 DEMENTIA WITHOUT BEHAVIORAL DISTURBANCE, UNSPECIFIED DEMENTIA TYPE: ICD-10-CM

## 2017-07-14 DIAGNOSIS — I48.20 ATRIAL FIBRILLATION, CHRONIC (H): ICD-10-CM

## 2017-07-14 DIAGNOSIS — I50.22 CHRONIC SYSTOLIC HEART FAILURE (H): Primary | ICD-10-CM

## 2017-07-14 DIAGNOSIS — E78.5 HYPERLIPIDEMIA LDL GOAL <130: ICD-10-CM

## 2017-07-14 DIAGNOSIS — I63.50 CEREBRAL ARTERY OCCLUSION WITH CEREBRAL INFARCTION (H): ICD-10-CM

## 2017-07-14 PROCEDURE — 99607 MTMS BY PHARM ADDL 15 MIN: CPT | Performed by: PHARMACIST

## 2017-07-14 PROCEDURE — 99605 MTMS BY PHARM NP 15 MIN: CPT | Performed by: PHARMACIST

## 2017-07-14 NOTE — PROGRESS NOTES
SUBJECTIVE/OBJECTIVE:                           Sylvester Davis is a 85 year old male called for an initial visit for Medication Therapy Management.  He was referred to me from his [employer] insurance plan. Visit today was conducted with his son, Bj, who takes care of his father.     Chief Complaint: Comprehensive Medication Review.    Allergies/ADRs: Reviewed in Epic  Tobacco: History of tobacco dependence - quit 50 years ago   Alcohol: none  Caffeine: no caffeine  Activity: Not great per son.  Uses a stationary bike, but harder.   PMH: Reviewed in Epic    Medication Adherence: no issues reported and has assistance setting up med boxes    AFib/HFrEF/Stroke: Current medications include aspirin 81 mg daily, digoxin 125 mcg daily, metoprolol XL 12.5 mg daily, and torsemide 10 mg daily.  Patient reports no current medication side effects.     ECHO:  Date 10/7/16, EF 55-60%  Pt is not complaining of sx of HF.  Did have swelling issues in the spring, but these improved with current torsemide dose.  Pt is not measuring daily weights. Usually about three times a week, but stable.  Patient does not self-monitor BP.   Pt is following a low sodium diet, is avoiding EtOH.    Hyperlipidemia: Current therapy includes simvastatin 20 mg once daily.  Pt reports no significant myalgias or other side effects.     Dementia: Pt is taking Namenda 10 mg twice daily.  Been on for a few years. Son states it's hard to know how effective it has been due to his mom struggling with Alzheimer's disease around the time his father started this medication. Memory issues are both short and long term.  Son helps out with ADLs. Denies any behavior issues. States he thinks his PCP wants to continue and he is hesitant to stop if it is helping. Denies any side effects.    Hx of Prostate Cancer/Urinary Incontinence: Pt is taking tamsulosin 0.4 mg daily and oxybuytnin IR 5 mg daily in the morning. Started anticholinergic due to loop  diuretic. Previously worked with MTM and was told PCP was okay with patient decreasing dose and trying off of medication. They decreased dose which helped with fatigue symptoms, but never tried trial off. Pt states his father's memory issues are still not good and he is tired during the day, but does not know why.     Current labs include:  BP Readings from Last 3 Encounters:   06/05/17 126/60   05/30/17 118/70   10/31/16 138/70     Today's Vitals: There were no vitals taken for this visit. - telephone encounter    Lab Results   Component Value Date    CHOL 125 06/10/2017     Lab Results   Component Value Date    TRIG 213 06/10/2017     Lab Results   Component Value Date    HDL 32 06/10/2017     Lab Results   Component Value Date    LDL 50 06/10/2017       Liver Function Studies -   Recent Labs   Lab Test  08/20/16   0901   PROTTOTAL  7.3   ALBUMIN  3.8   BILITOTAL  1.9*   ALKPHOS  53   AST  17   ALT  17       Lab Results   Component Value Date    UCRR 60 08/22/2016    MICROL 16 08/22/2016    UMALCR 27.38 (H) 08/22/2016       Last Basic Metabolic Panel:  Lab Results   Component Value Date     05/30/2017      Lab Results   Component Value Date    POTASSIUM 3.9 05/30/2017     Lab Results   Component Value Date    CHLORIDE 106 05/30/2017     Lab Results   Component Value Date    BUN 14 05/30/2017     Lab Results   Component Value Date    CR 1.22 05/30/2017     GFR Estimate If Black   Date Value Ref Range Status   05/30/2017 68 >60 mL/min/1.7m2 Final   08/20/2016 65 >60 mL/min/1.7m2 Final   06/15/2016 68 >60 mL/min/1.7m2 Final     TSH   Date Value Ref Range Status   01/24/2014 2.26 0.4 - 5.0 mU/L Final     Most Recent Immunizations   Administered Date(s) Administered     Influenza (High Dose) 3 valent vaccine 10/30/2015     Influenza Vaccine IM 3yrs+ 4 Valent IIV4 11/10/2014     Tdap (Adacel,Boostrix) 07/22/2005       ASSESSMENT:                             Current medications were reviewed today. Medicare Part D  topics discussed:Medication changes    Medication Adherence: no issues identified    Afib/HFrEF/Stroke: Stable. Pt is meeting BP goal of <140/90 mmHg Last digoxin level was WNL and pulses have been stable. Not on warfarin due to history of fall with intracerebral bleed.      Hyperlipidemia: Stable. Pt is not on high intensity statin which is indicated based on 2013 ACC/AHA guidelines for lipid management but with current age and co-morbidities and LDL <70, recommend no change.     Dementia: Stable.  Per patient/son preference prefer to continue at this time, but unclear if patient is receiving benefit.       Hx of Prostate Cancer/Urinary incontinence: needs improvement.  Patient is currently wearing pads and uncertain if oxybutynin has benefit (added when torsemide added) and may add to memory impairment.  Did not trial off as previously directed - may benefit from continuing previous plan.      PLAN:                            1. Pt to consider trial off of oxybutynin as previously directed.    I spent 30 minutes with this patient today (an extra 15 minutes was spent creating the Medication Action Plan). A copy of the visit note was provided to the patient's primary care provider.    Will follow up in 4 weeks or sooner if needed.    The patient was mailed a summary of these recommendations as an after visit summary.     Maikel Birch, [place]  Medication Therapy Management Provider  Pager (voicemail): [phone]

## 2017-07-14 NOTE — LETTER
"     [facility] MTM     Date: 2017    Sylvester Davis  [address] [address]    Dear Mr. Davis,    Thank you for talking with me on 17 about your health and medications. Medicare s MTM (Medication Therapy Management) program helps you understand your medications and use them safely.      This letter includes an action plan (Medication Action Plan) and medication list (Personal Medication List). The action plan has steps you should take to help you get the best results from your medications. The medication list will help you keep track of your medications and how to use them the right way.       Have your action plan and medication list with you when you talk with your doctors, pharmacists, and other healthcare providers in your care team.     Ask your doctors, pharmacists, and other healthcare providers to update the action plan and medication list at every visit.     Take your medication list with you if you go to the hospital or emergency room.     Give a copy of the action plan and medication list to your family or caregivers.     If you want to talk about this letter or any of the papers with it, please call   [phone].   We look forward to working with you, your doctors, and other healthcare providers to help you stay healthy.     Sincerely,    Shawn Birch      Enclosed: Medication Action Plan and Personal Medication List    MEDICATION ACTION PLAN FOR Sylvester Davis,  12/3/1931     This action plan will help you get the best results from your medications if you:   1. Read \"What we talked about.\"   2. Take the steps listed in the \"What I need to do\" boxes.   3. Fill in \"What I did and when I did it.\"   4. Fill in \"My follow-up plan\" and \"Questions I want to ask.\"     Have this action plan with you when you talk with your doctors, pharmacists, and other healthcare providers in your care team. Share this with your family or " caregivers too.  DATE PREPARED: 2017  What we talked about: You were previously directed to reduce oxybutynin to 5 mg ONCE daily and then if issues did not worsen attempt a trial off of the medication. Given the potential to contribute to memory issues and fatigue this may be worth attempting. If he does need a medication there may be other options that are less sedating.                                                   What I need to do: You should try to continue previous plan to trial off of oxybutynin. You could reduce to 2.5 mg and then try completely off if urinary issues do not worsen at lower dose.  What I did and when I did it:                                              My follow-up plan:                 Questions I want to ask:              If you have any questions about your action plan, call Shawn Birch, Phone: [phone] , Monday-Friday 8-4:30pm.           MEDICATION LIST FOR Sylvester Davis,  12/3/1931     This medication list was made for you after we talked. We also used information from your doctor's chart.      Use blank rows to add new medications. Then fill in the dates you started using them.    Cross out medications when you no longer use them. Then write the date and why you stopped using them.    Ask your doctors, pharmacists, and other healthcare providers to update this list at every visit. Keep this list up-to-date with:       Prescription medications    Over the counter drugs     Herbals    Vitamins    Minerals      If you go to the hospital or emergency room, take this list with you. Share this with your family or caregivers too.     DATE PREPARED: 2017  Allergies or side effects: Fish allergy and Coumadin     Medication:  ASPIRIN 81 MG PO TABS      How I use it:  Take 81 mg by mouth daily.      Why I use it:  Heart Health    Prescriber:  Patient Reported      Date I started using it:       Date I stopped using it:         Why I stopped using it:             Medication:  DIGOX 125 MCG PO TABS      How I use it:  TAKE 1 TABLET BY MOUTH EVERY DAY      Why I use it: Chronic systolic heart failure/AFib    Prescriber:  Manuelito Ansari MD      Date I started using it:       Date I stopped using it:         Why I stopped using it:            Medication:  MEMANTINE HCL 10 MG PO TABS      How I use it:  TAKE 1 TABLET BY MOUTH TWICE DAILY      Why I use it: Dementia    Prescriber:  Manuelito Ansari MD      Date I started using it:       Date I stopped using it:         Why I stopped using it:            Medication:  METOPROLOL SUCCINATE ER 25 MG PO TB24      How I use it:  TAKE ONE-HALF TABLET BY MOUTH DAILY      Why I use it: Hypertension    Prescriber:  Manuelito Ansari MD      Date I started using it:       Date I stopped using it:         Why I stopped using it:            Medication:  OXYBUTYNIN CHLORIDE PO      How I use it:  Take 5 mg by mouth daily      Why I use it:  Incontinence    Prescriber:  Patient Reported      Date I started using it:       Date I stopped using it:         Why I stopped using it:            Medication:  SIMVASTATIN 20 MG PO TABS      How I use it:  Take 1 tablet (20 mg) by mouth At Bedtime      Why I use it: High Cholesterol    Prescriber:  Manuelito Ansari MD      Date I started using it:       Date I stopped using it:         Why I stopped using it:            Medication:  TAMSULOSIN HCL 0.4 MG PO CAPS      How I use it:  Take 1 capsule (0.4 mg) by mouth daily      Why I use it: Prostate issue    Prescriber:  Karissa Barboza MD      Date I started using it:       Date I stopped using it:         Why I stopped using it:            Medication:  TORSEMIDE 10 MG PO TABS      How I use it:  Take 1 tablet (10 mg) by mouth daily      Why I use it: Chronic systolic congestive heart failure    Prescriber:  FARHAN Allen CNP      Date I started using it:       Date I stopped using it:         Why I stopped  using it:            Medication:         How I use it:         Why I use it:      Prescriber:         Date I started using it:       Date I stopped using it:         Why I stopped using it:            Medication:         How I use it:         Why I use it:      Prescriber:         Date I started using it:       Date I stopped using it:         Why I stopped using it:            Medication:         How I use it:         Why I use it:      Prescriber:         Date I started using it:       Date I stopped using it:         Why I stopped using it:              Other Information:     If you have any questions about your action plan, call [phone].    According to the Paperwork Reduction Act of 1995, no persons are required to respond to a collection of information unless it displays a valid OMB control number. The valid OMB number for this information collection is [id number]. The time required to complete this information collection is estimated to average 40 minutes per response, including the time to review instructions, searching existing data resources, gather the data needed, and complete and review the information collection. If you have any comments concerning the accuracy of the time estimate(s) or suggestions for improving this form, please write to: CMS, Attn: SILVA Reports Clearance Officer, [address] [place] [phone].

## 2017-07-14 NOTE — MR AVS SNAPSHOT
After Visit Summary   7/14/2017    Sylvester Davis    MRN: [id number]           Patient Information     Date Of Birth          12/3/1931        Visit Information        Provider Department      7/14/2017 1:00 PM Shawn Birch [facility] [place]        Today's Diagnoses     Chronic systolic heart failure (H)    -  1    Atrial fibrillation, chronic (H)        Cerebral artery occlusion with cerebral infarction (H)        Hyperlipidemia LDL goal <130        Dementia without behavioral disturbance, unspecified dementia type        History of prostate cancer        Urinary incontinence, unspecified type          Care Instructions    Sent via Medication Action Plan letter.          Follow-ups after your visit        Who to contact     If you have questions or need follow up information about today's clinic visit or your schedule please contact [facility] directly at [phone].  Normal or non-critical lab and imaging results will be communicated to you by MyChart, letter or phone within 4 business days after the clinic has received the results. If you do not hear from us within 7 days, please contact the clinic through MyChart or phone. If you have a critical or abnormal lab result, we will notify you by phone as soon as possible.  Submit refill requests through [employer] or call your pharmacy and they will forward the refill request to us. Please allow 3 business days for your refill to be completed.          Additional Information About Your Visit        Care EveryWhere ID     This is your Care EveryWhere ID. This could be used by other organizations to access your [facility] medical records  [id number]         Blood Pressure from Last 3 Encounters:   06/05/17 126/60   05/30/17 118/70   10/31/16 138/70    Weight from Last 3 Encounters:   06/05/17 177 lb (80.3 kg)   05/30/17 170 lb 12.8 oz (77.5 kg)   10/31/16 169 lb (76.7 kg)              Today, you had the  following     No orders found for display       Primary Care Provider Office Phone # Fax #    Manuelito Ansari [phone]       [facility] [address]  [facility] [address]        Equal Access to Services     MITESHCASSANDRA TOO : Hadii aad ku hadasho Soomaali, waaxda luqadaha, qaybta kaalmada adeegyada, waxay idiin hayaan adeeg kharash la'heathern ah. So [facility] [phone].    ATENCIÓN: Si habla español, tiene a godinez disposición servicios gratuitos de asistencia lingüística. Llame al [phone].    We comply with applicable federal civil rights laws and [place] laws. We do not discriminate on the basis of race, color, national origin, age, disability sex, sexual orientation or gender identity.            Thank you!     Thank you for choosing [facility]  for your care. Our goal is always to provide you with excellent care. Hearing back from our patients is one way we can continue to improve our services. Please take a few minutes to complete the written survey that you may receive in the mail after your visit with us. Thank you!             Your Updated Medication List - Protect others around you: Learn how to safely use, store and throw away your medicines at www.disposemymeds.org.          This list is accurate as of: 7/14/17  5:07 PM.  Always use your most recent med list.                   Brand Name Dispense Instructions for use Diagnosis    aspirin 81 MG tablet      Take 81 mg by mouth daily.        DIGOX 125 MCG tablet   Generic drug:  digoxin     90 tablet    TAKE 1 TABLET BY MOUTH EVERY DAY    Chronic systolic heart failure (H)       memantine 10 MG tablet    NAMENDA    180 tablet    TAKE 1 TABLET BY MOUTH TWICE DAILY    Dementia       metoprolol 25 MG 24 hr tablet    TOPROL-XL    45 tablet    TAKE ONE-HALF TABLET BY MOUTH DAILY    Essential hypertension       OXYBUTYNIN CHLORIDE PO      Take 5 mg by mouth daily        simvastatin 20 MG tablet    ZOCOR     90 tablet    Take 1 tablet (20 mg) by mouth At Bedtime    Hyperlipidemia LDL goal <130       tamsulosin 0.4 MG capsule    FLOMAX    90 capsule    Take 1 capsule (0.4 mg) by mouth daily    History of prostate cancer       torsemide 10 MG tablet    DEMADEX    90 tablet    Take 1 tablet (10 mg) by mouth daily    Chronic systolic congestive heart failure (H)

## 2017-07-20 DIAGNOSIS — Z85.46 HISTORY OF PROSTATE CANCER: ICD-10-CM

## 2017-07-20 RX ORDER — TAMSULOSIN HYDROCHLORIDE 0.4 MG/1
CAPSULE ORAL
Qty: 90 CAPSULE | Refills: 1 | Status: SHIPPED | OUTPATIENT
Start: 2017-07-20 | End: 2018-05-01

## 2017-07-20 NOTE — TELEPHONE ENCOUNTER
Tamsulosin 0.4 mg         Last Written Prescription Date: 10/30/15  Last Fill Quantity: 90, # refills: 4    Last Office Visit with G, P or [facility] prescribing provider:  6/5/17   Future Office Visit:      BP Readings from Last 3 Encounters:   06/05/17 126/60   05/30/17 118/70   10/31/16 138/70

## 2017-08-14 DIAGNOSIS — I50.22 CHRONIC SYSTOLIC HEART FAILURE (H): ICD-10-CM

## 2017-08-14 NOTE — TELEPHONE ENCOUNTER
DIGOX 125 MCG tablet  Last Written Prescription Date: 11/16/2016  Last Fill Quantity: 90, # refills: 1  Last Office Visit with G, [facility] or [facility] prescribing provider:  06/05/2017        Creatinine   Date Value Ref Range Status   05/30/2017 1.22 0.70 - 1.30 mg/dL Final   ]    Digoxin Level:    Lab Results   Component Value Date    DIGOXIN 1.0 10/30/2015

## 2017-08-17 RX ORDER — DIGOXIN 125 UG/1
TABLET ORAL
Qty: 90 TABLET | Refills: 0 | Status: SHIPPED | OUTPATIENT
Start: 2017-08-17 | End: 2017-11-14

## 2017-08-17 NOTE — TELEPHONE ENCOUNTER
Routing refill request to provider for review/approval because:  Labs not current:  Digoxin level

## 2017-10-07 ENCOUNTER — OFFICE VISIT (OUTPATIENT)
Dept: FAMILY MEDICINE | Facility: CLINIC | Age: 82
End: 2017-10-07
Payer: COMMERCIAL

## 2017-10-07 VITALS
HEIGHT: 68 IN | TEMPERATURE: 98.7 F | BODY MASS INDEX: 25.69 KG/M2 | HEART RATE: 67 BPM | OXYGEN SATURATION: 98 % | SYSTOLIC BLOOD PRESSURE: 138 MMHG | WEIGHT: 169.5 LBS | DIASTOLIC BLOOD PRESSURE: 66 MMHG

## 2017-10-07 DIAGNOSIS — G30.9 ALZHEIMER'S DEMENTIA WITHOUT BEHAVIORAL DISTURBANCE, UNSPECIFIED TIMING OF DEMENTIA ONSET: ICD-10-CM

## 2017-10-07 DIAGNOSIS — F02.80 ALZHEIMER'S DEMENTIA WITHOUT BEHAVIORAL DISTURBANCE, UNSPECIFIED TIMING OF DEMENTIA ONSET: ICD-10-CM

## 2017-10-07 DIAGNOSIS — D63.1 ANEMIA IN CHRONIC KIDNEY DISEASE, UNSPECIFIED CKD STAGE: ICD-10-CM

## 2017-10-07 DIAGNOSIS — Z00.00 ROUTINE MEDICAL EXAM: Primary | ICD-10-CM

## 2017-10-07 DIAGNOSIS — I10 ESSENTIAL HYPERTENSION: Chronic | ICD-10-CM

## 2017-10-07 DIAGNOSIS — I50.22 CHRONIC SYSTOLIC HEART FAILURE (H): ICD-10-CM

## 2017-10-07 DIAGNOSIS — N18.30 CKD (CHRONIC KIDNEY DISEASE) STAGE 3, GFR 30-59 ML/MIN (H): ICD-10-CM

## 2017-10-07 DIAGNOSIS — Z85.46 HISTORY OF PROSTATE CANCER: ICD-10-CM

## 2017-10-07 DIAGNOSIS — Z23 NEED FOR PROPHYLACTIC VACCINATION AND INOCULATION AGAINST INFLUENZA: ICD-10-CM

## 2017-10-07 DIAGNOSIS — N18.9 ANEMIA IN CHRONIC KIDNEY DISEASE, UNSPECIFIED CKD STAGE: ICD-10-CM

## 2017-10-07 DIAGNOSIS — I48.20 ATRIAL FIBRILLATION, CHRONIC (H): ICD-10-CM

## 2017-10-07 LAB
ALBUMIN SERPL-MCNC: 3.8 G/DL (ref 3.4–5)
ALBUMIN UR-MCNC: 30 MG/DL
ALP SERPL-CCNC: 53 U/L (ref 40–150)
ALT SERPL W P-5'-P-CCNC: 16 U/L (ref 0–70)
ANION GAP SERPL CALCULATED.3IONS-SCNC: 6 MMOL/L (ref 3–14)
APPEARANCE UR: CLEAR
AST SERPL W P-5'-P-CCNC: 15 U/L (ref 0–45)
BASOPHILS # BLD AUTO: 0 10E9/L (ref 0–0.2)
BASOPHILS NFR BLD AUTO: 0.2 %
BILIRUB SERPL-MCNC: 1.6 MG/DL (ref 0.2–1.3)
BILIRUB UR QL STRIP: NEGATIVE
BUN SERPL-MCNC: 12 MG/DL (ref 7–30)
CALCIUM SERPL-MCNC: 8.9 MG/DL (ref 8.5–10.1)
CHLORIDE SERPL-SCNC: 106 MMOL/L (ref 94–109)
CHOLEST SERPL-MCNC: 121 MG/DL
CO2 SERPL-SCNC: 29 MMOL/L (ref 20–32)
COLOR UR AUTO: YELLOW
CREAT SERPL-MCNC: 1.26 MG/DL (ref 0.66–1.25)
DIFFERENTIAL METHOD BLD: ABNORMAL
EOSINOPHIL # BLD AUTO: 0.1 10E9/L (ref 0–0.7)
EOSINOPHIL NFR BLD AUTO: 2.8 %
ERYTHROCYTE [DISTWIDTH] IN BLOOD BY AUTOMATED COUNT: 13.4 % (ref 10–15)
GFR SERPL CREATININE-BSD FRML MDRD: 54 ML/MIN/1.7M2
GLUCOSE SERPL-MCNC: 101 MG/DL (ref 70–99)
GLUCOSE UR STRIP-MCNC: NEGATIVE MG/DL
HCT VFR BLD AUTO: 36.6 % (ref 40–53)
HDLC SERPL-MCNC: 40 MG/DL
HGB BLD-MCNC: 12.3 G/DL (ref 13.3–17.7)
HGB UR QL STRIP: NEGATIVE
KETONES UR STRIP-MCNC: NEGATIVE MG/DL
LDLC SERPL CALC-MCNC: 47 MG/DL
LEUKOCYTE ESTERASE UR QL STRIP: NEGATIVE
LYMPHOCYTES # BLD AUTO: 1.2 10E9/L (ref 0.8–5.3)
LYMPHOCYTES NFR BLD AUTO: 23.6 %
MCH RBC QN AUTO: 31.1 PG (ref 26.5–33)
MCHC RBC AUTO-ENTMCNC: 33.6 G/DL (ref 31.5–36.5)
MCV RBC AUTO: 93 FL (ref 78–100)
MONOCYTES # BLD AUTO: 0.5 10E9/L (ref 0–1.3)
MONOCYTES NFR BLD AUTO: 9.9 %
NEUTROPHILS # BLD AUTO: 3.2 10E9/L (ref 1.6–8.3)
NEUTROPHILS NFR BLD AUTO: 63.5 %
NITRATE UR QL: NEGATIVE
NONHDLC SERPL-MCNC: 81 MG/DL
PH UR STRIP: 6.5 PH (ref 5–7)
PLATELET # BLD AUTO: 157 10E9/L (ref 150–450)
POTASSIUM SERPL-SCNC: 4.3 MMOL/L (ref 3.4–5.3)
PROT SERPL-MCNC: 8 G/DL (ref 6.8–8.8)
RBC # BLD AUTO: 3.95 10E12/L (ref 4.4–5.9)
RBC #/AREA URNS AUTO: ABNORMAL /HPF
SODIUM SERPL-SCNC: 141 MMOL/L (ref 133–144)
SOURCE: ABNORMAL
SP GR UR STRIP: 1.01 (ref 1–1.03)
TRIGL SERPL-MCNC: 169 MG/DL
UROBILINOGEN UR STRIP-ACNC: 2 EU/DL (ref 0.2–1)
WBC # BLD AUTO: 5.1 10E9/L (ref 4–11)
WBC #/AREA URNS AUTO: ABNORMAL /HPF

## 2017-10-07 PROCEDURE — 81001 URINALYSIS AUTO W/SCOPE: CPT | Performed by: FAMILY MEDICINE

## 2017-10-07 PROCEDURE — 99397 PER PM REEVAL EST PAT 65+ YR: CPT | Mod: 25 | Performed by: FAMILY MEDICINE

## 2017-10-07 PROCEDURE — 85025 COMPLETE CBC W/AUTO DIFF WBC: CPT | Performed by: FAMILY MEDICINE

## 2017-10-07 PROCEDURE — 90662 IIV NO PRSV INCREASED AG IM: CPT | Performed by: FAMILY MEDICINE

## 2017-10-07 PROCEDURE — 36415 COLL VENOUS BLD VENIPUNCTURE: CPT | Performed by: FAMILY MEDICINE

## 2017-10-07 PROCEDURE — 80053 COMPREHEN METABOLIC PANEL: CPT | Performed by: FAMILY MEDICINE

## 2017-10-07 PROCEDURE — G0008 ADMIN INFLUENZA VIRUS VAC: HCPCS | Performed by: FAMILY MEDICINE

## 2017-10-07 PROCEDURE — 80061 LIPID PANEL: CPT | Performed by: FAMILY MEDICINE

## 2017-10-07 NOTE — PROGRESS NOTES
SUBJECTIVE:   Sylvester Davis is a 85 year old male who presents for Preventive Visit.    Here today with son, Bj.    Are you in the first 12 months of your Medicare Part B coverage?  No    Healthy Habits:    Do you get at least three servings of calcium containing foods daily (dairy, green leafy vegetables, etc.)? yes    Amount of exercise or daily activities, outside of work: Minimal    Problems taking medications regularly No    Medication side effects: No    Have you had an eye exam in the past two years? yes    Do you see a dentist twice per year? No once    Do you have sleep apnea, excessive snoring or daytime drowsiness? Yes daytime drowsiness    COGNITIVE SCREEN  1) Repeat 3 items (Banana, Sunrise, Chair)    2) Clock draw: ABNORMAL-unable to see and has dementia  3) 3 item recall: Recalls NO objects   Results: 0 items recalled: PROBABLE COGNITIVE IMPAIRMENT, **INFORM PROVIDER**    Mini-CogTM Copyright AARON Duarte. Licensed by the author for use in [facility]; reprinted with permission ([email or web address]). All rights reserved.            Reviewed and updated as needed this visit by clinical staffTobacco  Allergies  Meds  Med Hx  Surg Hx  Fam Hx  Soc Hx        Reviewed and updated as needed this visit by Provider        Social History   Substance Use Topics     Smoking status: Former Smoker     Packs/day: 1.00     Years: 10.00     Types: Cigarettes     Smokeless tobacco: Never Used     Alcohol use No       The patient does not drink >3 drinks per day nor >7 drinks per week.    Today's PHQ-2 Score:   PHQ-2 ( 1999 [employer]) 6/5/2017 8/17/2016   Q1: Little interest or pleasure in doing things 0 0   Q2: Feeling down, depressed or hopeless 0 0   PHQ-2 Score 0 0         Do you feel safe in your environment - Yes    Do you have a Health Care Directive?: Yes: Patient states has Advance Directive and will bring in a copy to clinic.    Current providers sharing in care for this patient include:  Patient Care Team:  Manuelito Ansari MD as PCP - General (Family Practice)      Hearing impairment: No    Ability to successfully perform activities of daily living: No, needs assistance with: phone, transportation, shopping, preparing meals, housework, laundry, medications and managing money     Fall risk:  Fallen 2 or more times in the past year?: No  Any fall with injury in the past year?: No      Home safety:  throw rugs in the hallway and lack of grab bars in the bathroom    The following health maintenance items are reviewed in Epic and correct as of today:Health Maintenance   Topic Date Due     ADVANCE DIRECTIVE PLANNING Q5 YRS  12/03/1986     PNEUMOCOCCAL (1 of 2 - PCV13) 12/03/1996     DIGOXIN LEVEL Q1 YR  10/30/2016     HF ACTION PLAN Q3 YR  02/10/2017     ALT Q1 YR  08/20/2017     MICROALBUMIN Q1 YEAR  08/22/2017     INFLUENZA VACCINE (SYSTEM ASSIGNED)  09/01/2017     BMP Q6 MOS  11/30/2017     BMP Q1 YR  05/30/2018     FALL RISK ASSESSMENT  06/05/2018     HEMOGLOBIN Q1 YR  06/10/2018     LIPID MONITORING Q1 YEAR  06/10/2018     CBC Q1 YR  06/10/2018     TETANUS Q10 YR  10/26/2021     Patient Active Problem List   Diagnosis     History of prostate cancer     Short-term memory loss     Atrial fibrillation, chronic (H)     Dementia     Hyperlipidemia LDL goal <130     Anemia     Cerebral artery occlusion with cerebral infarction (H)     Aortic valve disorder     Mitral regurgitation     LVH (left ventricular hypertrophy)     Intracranial bleed (H)     Sick sinus syndrome (H)     Atrial flutter (H)     Cardiomyopathy (H)     Essential hypertension     CKD (chronic kidney disease) stage 3, GFR 30-59 ml/min     Chronic systolic heart failure (H)     Anemia in chronic kidney disease, unspecified CKD stage     Past Surgical History:   Procedure Laterality Date     GENITOURINARY SURGERY  2010    Resection of prostate     HEAD & NECK SURGERY  2009    Brain swelling - stopped coumadin but kept on aspirin per  "cardiology     wisdom teeth removal         Social History   Substance Use Topics     Smoking status: Former Smoker     Packs/day: 1.00     Years: 10.00     Types: Cigarettes     Smokeless tobacco: Never Used     Alcohol use No     Family History   Problem Relation Age of Onset     CANCER Mother                  ROS:  C: NEGATIVE for fever, chills, change in weight  I: NEGATIVE for worrisome rashes, moles or lesions  E: NEGATIVE for vision changes or irritation  E/M: NEGATIVE for ear, mouth and throat problems  R: NEGATIVE for significant cough or SOB  B: NEGATIVE for masses, tenderness or discharge  CV: NEGATIVE for chest pain, palpitations or peripheral edema  GI: NEGATIVE for nausea, abdominal pain, heartburn, or change in bowel habits  : NEGATIVE for frequency, dysuria, or hematuria  M: NEGATIVE for significant arthralgias or myalgia  N: NEGATIVE for weakness, dizziness or paresthesias  E: NEGATIVE for temperature intolerance, skin/hair changes  H: NEGATIVE for bleeding problems  P: NEGATIVE for changes in mood or affect  PSYCHIATRIC: POSITIVE for impaired memory    OBJECTIVE:   /64 (BP Location: Right arm, Patient Position: Chair, Cuff Size: Adult Regular)  Pulse 67  Temp 98.7  F (37.1  C) (Oral)  Ht 5' 7.75\" (1.721 m)  Wt 169 lb 8 oz (76.9 kg)  SpO2 98%  BMI 25.96 kg/m2 Estimated body mass index is 25.96 kg/(m^2) as calculated from the following:    Height as of this encounter: 5' 7.75\" (1.721 m).    Weight as of this encounter: 169 lb 8 oz (76.9 kg).  EXAM:   GENERAL: healthy, alert and no distress  EYES: Eyes grossly normal to inspection, PERRL and conjunctivae and sclerae normal  HENT: ear canals and TM's normal, nose and mouth without ulcers or lesions  NECK: no adenopathy, no asymmetry, masses, or scars and thyroid normal to palpation  RESP: lungs clear to auscultation - no rales, rhonchi or wheezes  CV: regular rate and rhythm, normal S1 S2, no S3 or S4, no murmur, click or rub, no " "peripheral edema and peripheral pulses strong  ABDOMEN: soft, nontender, no hepatosplenomegaly, no masses and bowel sounds normal  MS: no gross musculoskeletal defects noted, no edema  SKIN: no suspicious lesions or rashes  NEURO: Normal strength and tone, mentation intact and speech normal  PSYCH: mentation appears normal, affect normal/bright    ASSESSMENT / PLAN:       ICD-10-CM    1. Routine medical exam Z00.00    2. Essential hypertension I10 UA with Microscopic     CBC with platelets differential     Comprehensive metabolic panel   3. Chronic systolic heart failure (H) I50.22 Lipid Profile   4. CKD (chronic kidney disease) stage 3, GFR 30-59 ml/min N18.3    5. Alzheimer's dementia without behavioral disturbance, unspecified timing of dementia onset G30.9     F02.80    6. Anemia in chronic kidney disease, unspecified CKD stage N18.9     D63.1    7. History of prostate cancer Z85.46    8. Atrial fibrillation, chronic (H) I48.2        End of Life Planning:  Patient currently has an advanced directive: Yes.  Practitioner is supportive of decision.    COUNSELING:  Reviewed preventive health counseling, as reflected in patient instructions       Regular exercise        Estimated body mass index is 25.96 kg/(m^2) as calculated from the following:    Height as of this encounter: 5' 7.75\" (1.721 m).    Weight as of this encounter: 169 lb 8 oz (76.9 kg).     reports that he has quit smoking. His smoking use included Cigarettes. He has a 10.00 pack-year smoking history. He has never used smokeless tobacco.        Appropriate preventive services were discussed with this patient, including applicable screening as appropriate for cardiovascular disease, diabetes, osteopenia/osteoporosis, and glaucoma.  As appropriate for age/gender, discussed screening for colorectal cancer, prostate cancer, breast cancer, and cervical cancer. Checklist reviewing preventive services available has been given to the patient.    Reviewed " patients plan of care and provided an AVS. The Basic Care Plan (routine screening as documented in Health Maintenance) for Sylvester meets the Care Plan requirement. This Care Plan has been established and reviewed with the son.    Counseling Resources:  ATP IV Guidelines  Pooled Cohorts Equation Calculator  Breast Cancer Risk Calculator  FRAX Risk Assessment  ICSI Preventive Guidelines  Dietary Guidelines for Americans, 2010  [employer] MyPlate  ASA Prophylaxis  Lung CA Screening    Manuelito Ansari MD  [facility]

## 2017-10-07 NOTE — LETTER
October 9, 2017      Sylvester Davis  [address] [facility] [address]        Dear ,    We are writing to inform you of your test results.    Your test results fall within the expected range(s) or remain unchanged from previous results.  Please continue with current treatment plan.    Resulted Orders   UA with Microscopic   Result Value Ref Range    Color Urine Yellow     Appearance Urine Clear     Glucose Urine Negative NEG^Negative mg/dL    Bilirubin Urine Negative NEG^Negative    Ketones Urine Negative NEG^Negative mg/dL    Specific Gravity Urine 1.015 1.003 - 1.035    pH Urine 6.5 5.0 - 7.0 pH    Protein Albumin Urine 30 (A) NEG^Negative mg/dL    Urobilinogen Urine 2.0 (H) 0.2 - 1.0 EU/dL    Nitrite Urine Negative NEG^Negative    Blood Urine Negative NEG^Negative    Leukocyte Esterase Urine Negative NEG^Negative    Source Midstream Urine     WBC Urine O - 2 OTO2^O - 2 /HPF    RBC Urine O - 2 OTO2^O - 2 /HPF   CBC with platelets differential   Result Value Ref Range    WBC 5.1 4.0 - 11.0 10e9/L    RBC Count 3.95 (L) 4.4 - 5.9 10e12/L    Hemoglobin 12.3 (L) 13.3 - 17.7 g/dL      Comment:      Results confirmed by repeat test    Hematocrit 36.6 (L) 40.0 - 53.0 %    MCV 93 78 - 100 fl    MCH 31.1 26.5 - 33.0 pg    MCHC 33.6 31.5 - 36.5 g/dL    RDW 13.4 10.0 - 15.0 %    Platelet Count 157 150 - 450 10e9/L    Diff Method Automated Method     % Neutrophils 63.5 %    % Lymphocytes 23.6 %    % Monocytes 9.9 %    % Eosinophils 2.8 %    % Basophils 0.2 %    Absolute Neutrophil 3.2 1.6 - 8.3 10e9/L    Absolute Lymphocytes 1.2 0.8 - 5.3 10e9/L    Absolute Monocytes 0.5 0.0 - 1.3 10e9/L    Absolute Eosinophils 0.1 0.0 - 0.7 10e9/L    Absolute Basophils 0.0 0.0 - 0.2 10e9/L   Comprehensive metabolic panel   Result Value Ref Range    Sodium 141 133 - 144 mmol/L    Potassium 4.3 3.4 - 5.3 mmol/L    Chloride 106 94 - 109 mmol/L    Carbon Dioxide 29 20 - 32 mmol/L    Anion Gap 6 3 - 14 mmol/L     Glucose 101 (H) 70 - 99 mg/dL      Comment:      Fasting specimen    Urea Nitrogen 12 7 - 30 mg/dL    Creatinine 1.26 (H) 0.66 - 1.25 mg/dL    GFR Estimate 54 (L) >60 mL/min/1.7m2      Comment:      Non  GFR Calc    GFR Estimate If Black 66 >60 mL/min/1.7m2      Comment:       GFR Calc    Calcium 8.9 8.5 - 10.1 mg/dL    Bilirubin Total 1.6 (H) 0.2 - 1.3 mg/dL    Albumin 3.8 3.4 - 5.0 g/dL    Protein Total 8.0 6.8 - 8.8 g/dL    Alkaline Phosphatase 53 40 - 150 U/L    ALT 16 0 - 70 U/L    AST 15 0 - 45 U/L   Lipid Profile   Result Value Ref Range    Cholesterol 121 <200 mg/dL    Triglycerides 169 (H) <150 mg/dL      Comment:      Borderline high:  150-199 mg/dl  High:             200-499 mg/dl  Very high:       >499 mg/dl  Fasting specimen      HDL Cholesterol 40 >39 mg/dL    LDL Cholesterol Calculated 47 <100 mg/dL      Comment:      Desirable:       <100 mg/dl    Non HDL Cholesterol 81 <130 mg/dL       If you have any questions or concerns, please call the clinic at the number listed above.       Sincerely,        Manuelito Ansari MD

## 2017-10-07 NOTE — NURSING NOTE
"Chief Complaint   Patient presents with     Wellness Visit     FASTING       Initial /64 (BP Location: Right arm, Patient Position: Chair, Cuff Size: Adult Regular)  Pulse 67  Temp 98.7  F (37.1  C) (Oral)  Ht 5' 7.75\" (1.721 m)  Wt 169 lb 8 oz (76.9 kg)  SpO2 98%  BMI 25.96 kg/m2 Estimated body mass index is 25.96 kg/(m^2) as calculated from the following:    Height as of this encounter: 5' 7.75\" (1.721 m).    Weight as of this encounter: 169 lb 8 oz (76.9 kg).  Medication Reconciliation: complete     Princess GERMAIN Waters, EBONY      "

## 2017-10-07 NOTE — MR AVS SNAPSHOT
After Visit Summary   10/7/2017    Sylvester Davis    MRN: [id number]           Patient Information     Date Of Birth          12/3/1931        Visit Information        Provider Department      10/7/2017 10:30 AM Manuelito Ansari MD [facility]        Today's Diagnoses     Routine medical exam    -  1    Essential hypertension        Chronic systolic heart failure (H)        CKD (chronic kidney disease) stage 3, GFR 30-59 ml/min        Alzheimer's dementia without behavioral disturbance, unspecified timing of dementia onset        Anemia in chronic kidney disease, unspecified CKD stage        History of prostate cancer        Atrial fibrillation, chronic (H)          Care Instructions      Preventive Health Recommendations:       Male Ages 65 and over    Yearly exam:             See your health care provider every year in order to  o   Review health changes.   o   Discuss preventive care.    o   Review your medicines if your doctor has prescribed any.    Talk with your health care provider about whether you should have a test to screen for prostate cancer (PSA).    Every 3 years, have a diabetes test (fasting glucose). If you are at risk for diabetes, you should have this test more often.    Every 5 years, have a cholesterol test. Have this test more often if you are at risk for high cholesterol or heart disease.     Every 10 years, have a colonoscopy. Or, have a yearly FIT test (stool test). These exams will check for colon cancer.    Talk to with your health care provider about screening for Abdominal Aortic Aneurysm if you have a family history of AAA or have a history of smoking.  Shots:     Get a flu shot each year.     Get a tetanus shot every 10 years.     Talk to your doctor about your pneumonia vaccines. There are now two you should receive - Pneumovax (PPSV 23) and Prevnar (PCV 13).    Talk to your doctor about a shingles vaccine.     Talk to your  doctor about the hepatitis B vaccine.  Nutrition:     Eat at least 5 servings of fruits and vegetables each day.     Eat whole-grain bread, whole-wheat pasta and brown rice instead of white grains and rice.     Talk to your doctor about Calcium and Vitamin D.   Lifestyle    Exercise for at least 150 minutes a week (30 minutes a day, 5 days a week). This will help you control your weight and prevent disease.     Limit alcohol to one drink per day.     No smoking.     Wear sunscreen to prevent skin cancer.     See your dentist every six months for an exam and cleaning.     See your eye doctor every 1 to 2 years to screen for conditions such as glaucoma, macular degeneration and cataracts.          Follow-ups after your visit        Your next 10 appointments already scheduled     Dec 27, 2017  9:45 AM CST   Return Visit with August Mcclain MD   [facility]    [address] [place] [id number] [phone]              Who to contact     If you have questions or need follow up information about today's clinic visit or your schedule please contact [facility] directly at [phone].  Normal or non-critical lab and imaging results will be communicated to you by MyChart, letter or phone within 4 business days after the clinic has received the results. If you do not hear from us within 7 days, please contact the clinic through MyChart or phone. If you have a critical or abnormal lab result, we will notify you by phone as soon as possible.  Submit refill requests through [employer] or call your pharmacy and they will forward the refill request to us. Please allow 3 business days for your refill to be completed.          Additional Information About Your Visit        "[employer]hart Information     Outspark lets you send messages to your doctor, view your test results, renew your prescriptions, schedule appointments and  "more. To sign up, go to [email or web address] . Click on \"Log in\" on the left side of the screen, which will take you to the Welcome page. Then click on \"Sign up Now\" on the right side of the page.     You will be asked to enter the access code listed below, as well as some personal information. Please follow the directions to create your username and password.     Your access code is: [email or web address]  Expires: 2018 11:26 AM     Your access code will  in 90 days. If you need help or a new code, please call your [place] clinic or [phone].        Care EveryWhere ID     This is your Care EveryWhere ID. This could be used by other organizations to access your [place] medical records  [id number]        Your Vitals Were     Pulse Temperature Height Pulse Oximetry BMI (Body Mass Index)       67 98.7  F (37.1  C) (Oral) 5' 7.75\" (1.721 m) 98% 25.96 kg/m2        Blood Pressure from Last 3 Encounters:   10/07/17 138/66   17 126/60   17 118/70    Weight from Last 3 Encounters:   10/07/17 169 lb 8 oz (76.9 kg)   17 177 lb (80.3 kg)   17 170 lb 12.8 oz (77.5 kg)              We Performed the Following     CBC with platelets differential     Comprehensive metabolic panel     Lipid Profile     UA with Microscopic          Today's Medication Changes          These changes are accurate as of: 10/7/17 11:26 AM.  If you have any questions, ask your nurse or doctor.               These medicines have changed or have updated prescriptions.        Dose/Directions    tamsulosin 0.4 MG capsule   Commonly known as:  FLOMAX   This may have changed:  Another medication with the same name was removed. Continue taking this medication, and follow the directions you see here.   Used for:  History of prostate cancer   Changed by:  Manuelito Ansari MD        TAKE 1 CAPSULE BY MOUTH EVERY DAY   Quantity:  90 capsule   Refills:  1                Primary Care Provider Office Phone # Fax #    Manuelito Sousa " MD [place] [phone]       [address] [facility] [address] [facility] [address]        Equal Access to Services     LARRY GAGE : Hadii ayad andersen fabriziocrow Servandoali, waconsueloda luqjune, qaybta kaalmada genevieve, davis jennierobin desouza laNavneetvenu cerda. So [facility] [phone].    ATENCIÓN: Si habla español, tiene a godinez disposición servicios gratuitos de asistencia lingüística. Llame al [phone].    We comply with applicable federal civil rights laws and [place] laws. We do not discriminate on the basis of race, color, national origin, age, disability, sex, sexual orientation, or gender identity.            Thank you!     Thank you for choosing [facility]  for your care. Our goal is always to provide you with excellent care. Hearing back from our patients is one way we can continue to improve our services. Please take a few minutes to complete the written survey that you may receive in the mail after your visit with us. Thank you!             Your Updated Medication List - Protect others around you: Learn how to safely use, store and throw away your medicines at www.disposemymeds.org.          This list is accurate as of: 10/7/17 11:26 AM.  Always use your most recent med list.                   Brand Name Dispense Instructions for use Diagnosis    aspirin 81 MG tablet      Take 81 mg by mouth daily.        DIGOX 125 MCG tablet   Generic drug:  digoxin     90 tablet    TAKE 1 TABLET BY MOUTH EVERY DAY    Chronic systolic heart failure (H)       memantine 10 MG tablet    NAMENDA    180 tablet    TAKE 1 TABLET BY MOUTH TWICE DAILY    Dementia       metoprolol 25 MG 24 hr tablet    TOPROL-XL    45 tablet    TAKE ONE-HALF TABLET BY MOUTH DAILY    Essential hypertension       OXYBUTYNIN CHLORIDE PO      Take 5 mg by mouth daily        simvastatin 20 MG tablet    ZOCOR    90 tablet    Take 1 tablet (20 mg) by mouth At Bedtime    Hyperlipidemia LDL goal <130       tamsulosin 0.4 MG capsule     FLOMAX    90 capsule    TAKE 1 CAPSULE BY MOUTH EVERY DAY    History of prostate cancer       torsemide 10 MG tablet    DEMADEX    90 tablet    Take 1 tablet (10 mg) by mouth daily    Chronic systolic congestive heart failure (H)

## 2017-10-07 NOTE — PROGRESS NOTES
Injectable Influenza Immunization Documentation    1.  Is the person to be vaccinated sick today?   No    2. Does the person to be vaccinated have an allergy to a component   of the vaccine?   No    3. Has the person to be vaccinated ever had a serious reaction   to influenza vaccine in the past?   No    4. Has the person to be vaccinated ever had Guillain-Barré syndrome?   No    Form completed by Abram ABDI

## 2017-11-07 DIAGNOSIS — E78.5 HYPERLIPIDEMIA LDL GOAL <130: ICD-10-CM

## 2017-11-07 RX ORDER — SIMVASTATIN 20 MG
TABLET ORAL
Qty: 90 TABLET | Refills: 3 | Status: SHIPPED | OUTPATIENT
Start: 2017-11-07 | End: 2019-02-24

## 2017-12-27 ENCOUNTER — OFFICE VISIT (OUTPATIENT)
Dept: CARDIOLOGY | Facility: CLINIC | Age: 82
End: 2017-12-27
Attending: INTERNAL MEDICINE
Payer: COMMERCIAL

## 2017-12-27 VITALS
SYSTOLIC BLOOD PRESSURE: 136 MMHG | DIASTOLIC BLOOD PRESSURE: 78 MMHG | HEIGHT: 68 IN | WEIGHT: 168.3 LBS | BODY MASS INDEX: 25.51 KG/M2 | HEART RATE: 75 BPM

## 2017-12-27 DIAGNOSIS — I10 ESSENTIAL HYPERTENSION: Primary | Chronic | ICD-10-CM

## 2017-12-27 DIAGNOSIS — I50.42 CHRONIC COMBINED SYSTOLIC AND DIASTOLIC HEART FAILURE (H): ICD-10-CM

## 2017-12-27 PROCEDURE — 99214 OFFICE O/P EST MOD 30 MIN: CPT | Performed by: INTERNAL MEDICINE

## 2017-12-27 NOTE — LETTER
12/27/2017      Manuelito Ansari MD  [address]  [facility] [address]      RE: Sylvester Davis       Dear Colleague,    I had the pleasure of seeing Sylvester Davis in the [facility].    HISTORY OF PRESENT ILLNESS:  I had the pleasure of seeing Mr. Davis in followup at the [facility] today.  He is a very pleasant 86-year-old gentleman who I last saw in 10/2016.  He has a history of paroxysmal atrial fibrillation as well as heart failure with preserved ejection fraction.  He also has a history of an intracerebral bleed and has not been on Coumadin for thromboembolic prophylaxis for this reason.  In addition, he previously has had a syncopal event that we thought was due to bradycardia, but the patient and his family do not wish to pursue pacemaker implantation.      At his last office visit, we briefly discussed the Watchman given his high CHADS-VASc score and contraindication to anticoagulation.  Ultimately the patient and his son declined further evaluation in this regard.  He has seen Alia Hernandez since then in our Cardiology Clinic and has been doing well overall from a cardiovascular standpoint.        Today he presents continuing to feel well.  He specifically denies any chest discomfort, dyspnea on exertion, PND, orthopnea or lower extremity edema.  Denies any syncope or presyncope.  He is still living with his son and is not very active at baseline.  Nevertheless, his baseline level of activity is not impacted by any cardiac limitations.      IMPRESSION:   1.  Paroxysmal atrial fibrillation/flutter.   2.  History of intracerebral bleed in the past.  He is not on any anticoagulant therapy.  He is on low-dose aspirin.   3.  Moderate aortic stenosis.   4.  Hypertension.      Mr. Davis is doing well overall from a cardiovascular standpoint.  There is no evidence of angina or congestive heart failure.  His medications  appear to be appropriate.  We have already discussed the Watchman device and the patient and his son are not interested in pursuing this further.      It was a pleasure seeing him in followup today.  Assuming his clinical status remains stable, I will have him follow up with Alia in about 6 months.       Outpatient Encounter Prescriptions as of 12/27/2017   Medication Sig Dispense Refill     DIGOX 125 MCG tablet TAKE 1 TABLET BY MOUTH DAILY 90 tablet 3     simvastatin (ZOCOR) 20 MG tablet TAKE 1 TABLET BY MOUTH AT BEDTIME 90 tablet 3     tamsulosin (FLOMAX) 0.4 MG capsule TAKE 1 CAPSULE BY MOUTH EVERY DAY 90 capsule 1     OXYBUTYNIN CHLORIDE PO Take 5 mg by mouth daily       torsemide (DEMADEX) 10 MG tablet Take 1 tablet (10 mg) by mouth daily 90 tablet 1     metoprolol (TOPROL-XL) 25 MG 24 hr tablet TAKE ONE-HALF TABLET BY MOUTH DAILY 45 tablet 2     memantine (NAMENDA) 10 MG tablet TAKE 1 TABLET BY MOUTH TWICE DAILY 180 tablet 3     aspirin 81 MG tablet Take 81 mg by mouth daily.       [DISCONTINUED] simvastatin (ZOCOR) 20 MG tablet Take 1 tablet (20 mg) by mouth At Bedtime 90 tablet 1     No facility-administered encounter medications on file as of 12/27/2017.      Again, thank you for allowing me to participate in the care of your patient.      Sincerely,    August Mcclain MD     [facility]

## 2017-12-27 NOTE — PROGRESS NOTES
HPI and Plan:   See dictation    No orders of the defined types were placed in this encounter.      No orders of the defined types were placed in this encounter.      Medications Discontinued During This Encounter   Medication Reason     simvastatin (ZOCOR) 20 MG tablet Medication Reconciliation Clean Up         Encounter Diagnoses   Name Primary?     Cardiomyopathy (H)      Essential hypertension Yes     Chronic systolic heart failure (H)        CURRENT MEDICATIONS:  Current Outpatient Prescriptions   Medication Sig Dispense Refill     DIGOX 125 MCG tablet TAKE 1 TABLET BY MOUTH DAILY 90 tablet 3     simvastatin (ZOCOR) 20 MG tablet TAKE 1 TABLET BY MOUTH AT BEDTIME 90 tablet 3     tamsulosin (FLOMAX) 0.4 MG capsule TAKE 1 CAPSULE BY MOUTH EVERY DAY 90 capsule 1     OXYBUTYNIN CHLORIDE PO Take 5 mg by mouth daily       torsemide (DEMADEX) 10 MG tablet Take 1 tablet (10 mg) by mouth daily 90 tablet 1     metoprolol (TOPROL-XL) 25 MG 24 hr tablet TAKE ONE-HALF TABLET BY MOUTH DAILY 45 tablet 2     memantine (NAMENDA) 10 MG tablet TAKE 1 TABLET BY MOUTH TWICE DAILY 180 tablet 3     aspirin 81 MG tablet Take 81 mg by mouth daily.       [DISCONTINUED] simvastatin (ZOCOR) 20 MG tablet Take 1 tablet (20 mg) by mouth At Bedtime 90 tablet 1       ALLERGIES     Allergies   Allergen Reactions     Fish Allergy      Coumadin        PAST MEDICAL HISTORY:  Past Medical History:   Diagnosis Date     Aortic valve disorders     moderate aortic stenosis & 1+ AR per 8/2014 echo     Atrial fibrillation, chronic (H)     Sees cardiologist for meds and testing such as digoxin med/levels      Atrial flutter (H)      Cardiomyopathy (H)     resolved     CHF (congestive heart failure) (H) 10/26/2012     Dementia     chronic memory loss, son saima states he is poa     History of prostate cancer 10/26/2012     Hyperlipidemia LDL goal < 130      Hypertension      Intracranial bleed (H)     while on coumadin     LVH (left ventricular hypertrophy)      mild to moderate per 8/2014 echo     Memory loss, long term 10/26/2012    chronic memory loss, son saima states he is poa     Mitral regurgitation     1+ per 8/2014 echo     Sick sinus syndrome (H)      Unspecified cerebral artery occlusion with cerebral infarction      Urinary retention        PAST SURGICAL HISTORY:  Past Surgical History:   Procedure Laterality Date     GENITOURINARY SURGERY  2010    Resection of prostate     HEAD & NECK SURGERY  2009    Brain swelling - stopped coumadin but kept on aspirin per cardiology     wisdom teeth removal         FAMILY HISTORY:  Family History   Problem Relation Age of Onset     CANCER Mother        SOCIAL HISTORY:  Social History     Social History     Marital status:      Spouse name: N/A     Number of children: N/A     Years of education: N/A     Occupational History      Retired     [employer]     Social History Main Topics     Smoking status: Former Smoker     Packs/day: 1.00     Years: 10.00     Types: Cigarettes     Smokeless tobacco: Never Used     Alcohol use No     Drug use: No     Sexual activity: Not Currently     Other Topics Concern     Parent/Sibling W/ Cabg, Mi Or Angioplasty Before 65f 55m? No     Exercise Yes     cycling      Seat Belt Yes     Social History Narrative       Review of Systems:  Skin:  Negative for rash     Eyes:  Negative for visual blurring;double vision    ENT:  Negative for nasal congestion    Respiratory:  Negative for shortness of breath;cough;wheezing     Cardiovascular:  Negative for;palpitations;chest pain;lightheadedness;dizziness;edema Positive for;fatigue    Gastroenterology: Negative for poor appetite    Genitourinary:  Negative      Musculoskeletal:  Positive for arthritis uses cane  Neurologic:  Positive for memory problems    Psychiatric:  Negative for sleep disturbances    Heme/Lymph/Imm:  Negative chills;fever    Endocrine:  Negative for thyroid disorder;diabetes      Physical Exam:  Vitals: /78   "Pulse 75  Ht 1.721 m (5' 7.76\")  Wt 76.3 kg (168 lb 4.8 oz)  BMI 25.77 kg/m2    Constitutional:           Skin:  warm and dry to the touch          Head:           Eyes:  not assessed this visit        Lymph:      ENT:  no pallor or cyanosis        Neck:  carotid pulses are full and equal bilaterally        Respiratory:  clear to auscultation;normal breath sounds, clear to auscultation, normal A-P diameter, normal symmetry, normal respiratory excursion, no use of accessory muscles         Cardiac: normal S1 and S2 irregular rhythm           S1 normal, S2 normal, S1,S2, no S3 or S4 present, III/VI systolic murmur heard over the aortic area                                           GI:  abdomen soft        Extremities and Muscular Skeletal:  no edema   bilateral LE edema;trace          Neurological:           Psych:           CC  August Mcclain MD  [address]  [place] [address]              "

## 2017-12-27 NOTE — PROGRESS NOTES
HISTORY OF PRESENT ILLNESS:  I had the pleasure of seeing Mr. Davis in followup at the [facility] today.  He is a very pleasant 86-year-old gentleman who I last saw in 10/2016.  He has a history of paroxysmal atrial fibrillation as well as heart failure with preserved ejection fraction.  He also has a history of an intracerebral bleed and has not been on Coumadin for thromboembolic prophylaxis for this reason.  In addition, he previously has had a syncopal event that we thought was due to bradycardia, but the patient and his family do not wish to pursue pacemaker implantation.      At his last office visit, we briefly discussed the Watchman given his high CHADS-VASc score and contraindication to anticoagulation.  Ultimately the patient and his son declined further evaluation in this regard.  He has seen Alia Hernandez since then in our Cardiology Clinic and has been doing well overall from a cardiovascular standpoint.        Today he presents continuing to feel well.  He specifically denies any chest discomfort, dyspnea on exertion, PND, orthopnea or lower extremity edema.  Denies any syncope or presyncope.  He is still living with his son and is not very active at baseline.  Nevertheless, his baseline level of activity is not impacted by any cardiac limitations.      IMPRESSION:   1.  Paroxysmal atrial fibrillation/flutter.   2.  History of intracerebral bleed in the past.  He is not on any anticoagulant therapy.  He is on low-dose aspirin.   3.  Moderate aortic stenosis.   4.  Hypertension.      Mr. Davis is doing well overall from a cardiovascular standpoint.  There is no evidence of angina or congestive heart failure.  His medications appear to be appropriate.  We have already discussed the Watchman device and the patient and his son are not interested in pursuing this further.      It was a pleasure seeing him in followup today.  Assuming his clinical status remains stable, I  will have him follow up with Alia in about 6 months.         MARK RICHARD MD             D: 2017 10:28   T: 2017 11:24   MT: js      Name:     LEONCIO CORDERO   MRN:      [id number]        Account:      [id number]   :      1931           Service Date: 2017      Document: [id number]

## 2017-12-27 NOTE — MR AVS SNAPSHOT
"              After Visit Summary   2017    Sylvester Davis    MRN: [id number]           Patient Information     Date Of Birth          12/3/1931        Visit Information        Provider Department      2017 9:45 AM August Mcclain MD [facility]        Today's Diagnoses     Essential hypertension    -  1    Cardiomyopathy (H)        Chronic systolic heart failure (H)           Follow-ups after your visit        Who to contact     If you have questions or need follow up information about today's clinic visit or your schedule please contact [facility] directly at [phone].  Normal or non-critical lab and imaging results will be communicated to you by [email or web address], letter or phone within 4 business days after the clinic has received the results. If you do not hear from us within 7 days, please contact the clinic through [email or web address] or phone. If you have a critical or abnormal lab result, we will notify you by phone as soon as possible.  Submit refill requests through [employer] or call your pharmacy and they will forward the refill request to us. Please allow 3 business days for your refill to be completed.          Additional Information About Your Visit        MyChart Information     [employer] lets you send messages to your doctor, view your test results, renew your prescriptions, schedule appointments and more. To sign up, go to [email or web address] . Click on \"Log in\" on the left side of the screen, which will take you to the Welcome page. Then click on \"Sign up Now\" on the right side of the page.     You will be asked to enter the access code listed below, as well as some personal information. Please follow the directions to create your username and password.     Your access code is: [email or web address]  Expires: 2018 10:26 AM     Your access code will  in 90 days. If you need help or a new code, please call your " "[facility] or [phone].        Care EveryWhere ID     This is your Care EveryWhere ID. This could be used by other organizations to access your [place] medical records  [id number]        Your Vitals Were     Pulse Height BMI (Body Mass Index)             75 1.721 m (5' 7.76\") 25.77 kg/m2          Blood Pressure from Last 3 Encounters:   12/27/17 136/78   10/07/17 138/66   06/05/17 126/60    Weight from Last 3 Encounters:   12/27/17 76.3 kg (168 lb 4.8 oz)   10/07/17 76.9 kg (169 lb 8 oz)   06/05/17 80.3 kg (177 lb)              We Performed the Following     Follow-Up with Cardiologist        Primary Care Provider Office Phone # Fax #    Manuelito Ansari [phone] [address] [facility] [address]        Equal Access to Services     [facility] UCLAHARLAN : Hadii aad ku hadasho Soomaali, waaxda luqadaha, qaybta kaalmada adeegyada, waxay idiin hayheathern ino [place] . So [facility] [phone].    ATENCIÓN: Si habla español, tiene a godinez disposición servicios gratuitos de asistencia lingüística. Llame al [phone].    We comply with applicable federal civil rights laws and [place] laws. We do not discriminate on the basis of race, color, national origin, age, disability, sex, sexual orientation, or gender identity.            Thank you!     Thank you for choosing [facility]  for your care. Our goal is always to provide you with excellent care. Hearing back from our patients is one way we can continue to improve our services. Please take a few minutes to complete the written survey that you may receive in the mail after your visit with us. Thank you!             Your Updated Medication List - Protect others around you: Learn how to safely use, store and throw away your medicines at www.disposemymeds.org.          This list is accurate as of: 12/27/17 10:18 AM.  Always use your most recent med list.                   Brand Name Dispense " Instructions for use Diagnosis    aspirin 81 MG tablet      Take 81 mg by mouth daily.        DIGOX 125 MCG tablet   Generic drug:  digoxin     90 tablet    TAKE 1 TABLET BY MOUTH DAILY    Chronic systolic heart failure (H)       memantine 10 MG tablet    NAMENDA    180 tablet    TAKE 1 TABLET BY MOUTH TWICE DAILY    Dementia       metoprolol 25 MG 24 hr tablet    TOPROL-XL    45 tablet    TAKE ONE-HALF TABLET BY MOUTH DAILY    Essential hypertension       OXYBUTYNIN CHLORIDE PO      Take 5 mg by mouth daily        simvastatin 20 MG tablet    ZOCOR    90 tablet    TAKE 1 TABLET BY MOUTH AT BEDTIME    Hyperlipidemia LDL goal <130       tamsulosin 0.4 MG capsule    FLOMAX    90 capsule    TAKE 1 CAPSULE BY MOUTH EVERY DAY    History of prostate cancer       torsemide 10 MG tablet    DEMADEX    90 tablet    Take 1 tablet (10 mg) by mouth daily    Chronic systolic congestive heart failure (H)

## 2018-01-11 ENCOUNTER — CARE COORDINATION (OUTPATIENT)
Dept: CARDIOLOGY | Facility: CLINIC | Age: 83
End: 2018-01-11

## 2018-01-11 NOTE — PROGRESS NOTES
Call attempted to reach patient's son (Bj). He was not available, spoke with grand daughter. She wrote down Rhoda's recommendations :He needs to hold torsemide while having diarrhea. If not improved recommend that he see his PCP.Belen  Had grand daughter read back instructions, left call back number if Bj has any questions. Message sent to C[facility] board to call patient tomorrow to see if diarrhea improved. Beti Friedman RN 4:17 PM 01/11/18

## 2018-01-11 NOTE — PROGRESS NOTES
Received message from patient's son, Bj saying he has questions. Contacted Bj, he said his father has a virus, not sure if it's the flu. He wanted to know if patient can take Theraflu for his symptoms. I asked what symptoms he has. Pt has had 2 episodes of diarrhea, son is planning on giving him an immodium soon. I told him it sounds like he has a gi virus. I encouraged him to eat a bland diet, keep patient hydrated and rest. I discussed avoiding fluids like pedialyte, gatorade and powerade as they are high in sodium. Pt is not running a fever and appetite is good for now. Wt today was 163.2, wt was 168.4 on 12/27 on ov with . I have asked Bj to keep track of how many episodes of diarrhea he has and call with an update tomorrow (1/12). Pt is currently on torsemide 10 mg daily. I told him I would update [facility] and call back with her recommendations. Beti Friedman RN 2:12 PM 01/11/18

## 2018-01-11 NOTE — PROGRESS NOTES
He needs to hold torsemide while having diarrhea. If not improved recommend that he see his PCP.Belen

## 2018-01-12 NOTE — PROGRESS NOTES
Called pt's son Bj. Bj said pt cont'd to have diarrhea last evening and is just waking this AM. I rec'd to Bj that pt try BRAT diet, drink water, and rest; and per KMannchen CNP should hold torsemide while having active diarrhea and if not improved over wknd or worsens should call PCP for recs. Son agrees to plan.    Reminder sent to [facility] RN board to check in w/ them on 1/15.    Suri Simpson [facility] RN 9:20 AM 01/12/18  [phone]

## 2018-01-15 NOTE — PROGRESS NOTES
Contacted patient's son (Bj) for wt/sx update. Pt is feeling much better. He denies any sx of sob, abdominal bloating and swelling. Diarrhea subsided on Saturday, no further episodes since then. Appetite good. Torsemide was on hold since last Thursday, (1/11) per KMannchen. Wt on Friday was 161.0, Saturday 163.3, pt hasn't weighed this morning yet. I told Bj Matta will more than likely restart his torsemide, however, I will update her and call back with her recommendations. Beti Friedman RN 8:55 AM 01/15/18

## 2018-01-15 NOTE — PROGRESS NOTES
Call attempted to reach Bj (son) with Rhoda's recommendations: Restart Torsemide. Remind him to hold torsemide anytime he has diarrhea or vomiting. Left detailed message with instructions. Left call back number if he has any further questions. Beti Friedman RN 10:59 AM 01/15/18

## 2018-02-19 ENCOUNTER — OFFICE VISIT (OUTPATIENT)
Dept: FAMILY MEDICINE | Facility: CLINIC | Age: 83
End: 2018-02-19
Payer: COMMERCIAL

## 2018-02-19 VITALS
BODY MASS INDEX: 25.73 KG/M2 | WEIGHT: 168 LBS | TEMPERATURE: 97 F | DIASTOLIC BLOOD PRESSURE: 68 MMHG | RESPIRATION RATE: 13 BRPM | HEART RATE: 52 BPM | SYSTOLIC BLOOD PRESSURE: 138 MMHG

## 2018-02-19 DIAGNOSIS — F02.80 ALZHEIMER'S DEMENTIA WITHOUT BEHAVIORAL DISTURBANCE, UNSPECIFIED TIMING OF DEMENTIA ONSET: ICD-10-CM

## 2018-02-19 DIAGNOSIS — H25.89 OTHER AGE-RELATED CATARACT OF BOTH EYES: ICD-10-CM

## 2018-02-19 DIAGNOSIS — N18.9 ANEMIA IN CHRONIC KIDNEY DISEASE, UNSPECIFIED CKD STAGE: ICD-10-CM

## 2018-02-19 DIAGNOSIS — D63.1 ANEMIA IN CHRONIC KIDNEY DISEASE, UNSPECIFIED CKD STAGE: ICD-10-CM

## 2018-02-19 DIAGNOSIS — Z01.818 PREOP GENERAL PHYSICAL EXAM: Primary | ICD-10-CM

## 2018-02-19 DIAGNOSIS — G30.9 ALZHEIMER'S DEMENTIA WITHOUT BEHAVIORAL DISTURBANCE, UNSPECIFIED TIMING OF DEMENTIA ONSET: ICD-10-CM

## 2018-02-19 DIAGNOSIS — N18.30 CKD (CHRONIC KIDNEY DISEASE) STAGE 3, GFR 30-59 ML/MIN (H): ICD-10-CM

## 2018-02-19 PROCEDURE — 99214 OFFICE O/P EST MOD 30 MIN: CPT | Performed by: FAMILY MEDICINE

## 2018-02-19 NOTE — PROGRESS NOTES
"[facility]  [address] [place] [address] [address]  [facility] [address]  [phone]  Dept: [phone]    PRE-OP EVALUATION:  Today's date: 2018    Sylvester aDvis (: 12/3/1931) presents for pre-operative evaluation assessment as requested by Dr. Harrell.  He requires evaluation and anesthesia risk assessment prior to undergoing surgery/procedure for treatment of cataracs rt eye .  Proposed procedure: cataract    Date of Surgery/ Procedure: 2018  Time of Surgery/ Procedure: 7:30  Hospital/Surgical Facility: [facility]    Primary Physician: Manuelito Ansari  Type of Anesthesia Anticipated: Local with MAC    Patient has a Health Care Directive or Living Will:  NO    Preop Questions 2018   1.  Do you have a history of heart attack, stroke, stent, bypass or surgery on an artery in the head, neck, heart or legs? YES - heart failure   2.  Do you ever have any pain or discomfort in your chest? No   3.  Do you have a history of  Heart Failure? No   4.   Are you troubled by shortness of breath when:  walking on a level surface, or up a slight hill, or at night? No   5.  Do you currently have a cold, bronchitis or other respiratory infection? No   6.  Do you have a cough, shortness of breath, or wheezing? No   7.  Do you sometimes get pains in the calves of your legs when you walk? No   8. Do you or anyone in your family have previous history of blood clots? No   9.  Do you or does anyone in your family have a serious bleeding problem such as prolonged bleeding following surgeries or cuts? YES - taken off blood thinner after a fall due to \"blood on the brain\"   10. Have you ever had problems with anemia or been told to take iron pills? No   11. Have you had any abnormal blood loss such as black, tarry or bloody stools? No   12. Have you ever had a blood transfusion? No   13. Have you or any of your relatives ever had problems with anesthesia? " No   14. Do you have sleep apnea, excessive snoring or daytime drowsiness? No   15. Do you have any prosthetic heart valves? No   16. Do you have prosthetic joints? No         HPI:                                                      Brief HPI related to upcoming procedure: increasing problems with vision in both eyes      See problem list for active medical problems.  Problems all longstanding and stable, except as noted/documented.  See ROS for pertinent symptoms related to these conditions.                                                                                                  .    MEDICAL HISTORY:                                                      Patient Active Problem List    Diagnosis Date Noted     Anemia in chronic kidney disease, unspecified CKD stage 10/07/2017     Priority: Medium     Chronic systolic heart failure (H) 11/16/2015     Priority: Medium     Essential hypertension 10/30/2015     Priority: Medium     CKD (chronic kidney disease) stage 3, GFR 30-59 ml/min 10/30/2015     Priority: Medium     Cerebral artery occlusion with cerebral infarction (H)      Priority: Medium     Problem list name updated by automated process. Provider to review       Aortic valve disorder      Priority: Medium     moderate aortic stenosis & 1+ AR per 8/2014 echo  Problem list name updated by automated process. Provider to review       Mitral regurgitation      Priority: Medium     1+ per 8/2014 echo       LVH (left ventricular hypertrophy)      Priority: Medium     mild to moderate per 8/2014 echo       Intracranial bleed (H)      Priority: Medium     while on coumadin       Sick sinus syndrome (H)      Priority: Medium     Atrial flutter (H)      Priority: Medium     Cardiomyopathy (H)      Priority: Medium     resolved       Hyperlipidemia LDL goal <130 01/24/2014     Priority: Medium     Anemia 01/24/2014     Priority: Medium     Problem list name updated by automated process. Provider to review        Atrial fibrillation, chronic (H)      Priority: Medium     Sees cardiologist for meds and testing such as digoxin med/levels       Dementia      Priority: Medium     chronic memory loss, son saima states he is poa       History of prostate cancer 10/26/2012     Priority: Medium     Short-term memory loss 10/26/2012     Priority: Medium      Past Medical History:   Diagnosis Date     Aortic valve disorders     moderate aortic stenosis & 1+ AR per 8/2014 echo     Atrial fibrillation, chronic (H)     Sees cardiologist for meds and testing such as digoxin med/levels      Atrial flutter (H)      Cardiomyopathy (H)     resolved     CHF (congestive heart failure) (H) 10/26/2012     Dementia     chronic memory loss, son saima states he is poa     History of prostate cancer 10/26/2012     Hyperlipidemia LDL goal < 130      Hypertension      Intracranial bleed (H)     while on coumadin     LVH (left ventricular hypertrophy)     mild to moderate per 8/2014 echo     Memory loss, long term 10/26/2012    chronic memory loss, el landaverde states he is poa     Mitral regurgitation     1+ per 8/2014 echo     Sick sinus syndrome (H)      Unspecified cerebral artery occlusion with cerebral infarction      Urinary retention      Past Surgical History:   Procedure Laterality Date     GENITOURINARY SURGERY  2010    Resection of prostate     HEAD & NECK SURGERY  2009    Brain swelling - stopped coumadin but kept on aspirin per cardiology     wisdom teeth removal       Current Outpatient Prescriptions   Medication Sig Dispense Refill     metoprolol succinate (TOPROL-XL) 25 MG 24 hr tablet TAKE 1/2 TABLET BY MOUTH DAILY 45 tablet 2     DIGOX 125 MCG tablet TAKE 1 TABLET BY MOUTH DAILY 90 tablet 3     simvastatin (ZOCOR) 20 MG tablet TAKE 1 TABLET BY MOUTH AT BEDTIME 90 tablet 3     tamsulosin (FLOMAX) 0.4 MG capsule TAKE 1 CAPSULE BY MOUTH EVERY DAY 90 capsule 1     OXYBUTYNIN CHLORIDE PO Take 5 mg by mouth daily       torsemide (DEMADEX) 10 MG  tablet Take 1 tablet (10 mg) by mouth daily 90 tablet 1     memantine (NAMENDA) 10 MG tablet TAKE 1 TABLET BY MOUTH TWICE DAILY 180 tablet 3     aspirin 81 MG tablet Take 81 mg by mouth daily.       OTC products: None, except as noted above    Allergies   Allergen Reactions     Fish Allergy      Coumadin       Latex Allergy: NO    Social History   Substance Use Topics     Smoking status: Former Smoker     Packs/day: 1.00     Years: 10.00     Types: Cigarettes     Smokeless tobacco: Never Used     Alcohol use No     History   Drug Use No       REVIEW OF SYSTEMS:                                                    CONSTITUTIONAL: NEGATIVE for fever, chills, change in weight  INTEGUMENTARY/SKIN: NEGATIVE for worrisome rashes, moles or lesions  EYES: NEGATIVE for vision changes or irritation  ENT/MOUTH: NEGATIVE for ear, mouth and throat problems  RESP: NEGATIVE for significant cough or SOB  BREAST: NEGATIVE for masses, tenderness or discharge  CV: NEGATIVE for chest pain, palpitations or peripheral edema  GI: NEGATIVE for nausea, abdominal pain, heartburn, or change in bowel habits  : NEGATIVE for frequency, dysuria, or hematuria  MUSCULOSKELETAL:POSITIVE  for arthralgias right knee  NEURO: memory problems  ENDOCRINE: NEGATIVE for temperature intolerance, skin/hair changes  HEME: NEGATIVE for bleeding problems  PSYCHIATRIC: NEGATIVE for changes in mood or affect    EXAM:                                                    /68  Pulse 52  Temp 97  F (36.1  C) (Tympanic)  Resp 13  Wt 168 lb (76.2 kg)  BMI 25.73 kg/m2    GENERAL APPEARANCE: healthy, alert and no distress     EYES: EOMI,  PERRL     HENT: ear canals and TM's normal and nose and mouth without ulcers or lesions     NECK: no adenopathy, no asymmetry, masses, or scars and thyroid normal to palpation     RESP: lungs clear to auscultation - no rales, rhonchi or wheezes     CV: regular rates and rhythm, normal S1 S2, no S3 or S4 and no murmur, click or  rub     ABDOMEN:  soft, nontender, no HSM or masses and bowel sounds normal     MS: extremities normal- no gross deformities noted, no evidence of inflammation in joints, FROM in all extremities.     SKIN: no suspicious lesions or rashes     NEURO: Normal strength and tone, sensory exam grossly normal, mentation intact and speech normal     PSYCH: mentation appears normal. and affect normal/bright     LYMPHATICS: No axillary, cervical, or supraclavicular nodes    DIAGNOSTICS:                                                    No labs or EKG required for low risk surgery (cataract, skin procedure, breast biopsy, etc)    Recent Labs   Lab Test  10/07/17   1116  06/10/17   1132  05/30/17   1212   01/04/10   1425   HGB  12.3*  11.9*   --    < >  12.2*   PLT  157  135*   --    < >  185   INR   --    --    --    --   1.84*   NA  141   --   142   < >  139   POTASSIUM  4.3   --   3.9   < >  3.5   CR  1.26*   --   1.22   < >  1.10    < > = values in this interval not displayed.        IMPRESSION:                                                    Reason for surgery/procedure: increasing problems with vision due to cataracts    The proposed surgical procedure is considered LOW risk.    REVISED CARDIAC RISK INDEX  The patient has the following serious cardiovascular risks for perioperative complications such as (MI, PE, VFib and 3  AV Block):  Congestive Heart Failure (pulmonary edema, PND, s3 ibis, CXR with pulmonary congestion, basilar rales)  INTERPRETATION: 1 risks: Class II (low risk - 0.9% complication rate)    The patient has the following additional risks for perioperative complications:  No identified additional risks      ICD-10-CM    1. Preop general physical exam Z01.818    2. Other age-related cataract of both eyes H25.89    3. Anemia in chronic kidney disease, unspecified CKD stage N18.9     D63.1    4. CKD (chronic kidney disease) stage 3, GFR 30-59 ml/min N18.3    5. Alzheimer's dementia without behavioral  disturbance, unspecified timing of dementia onset G30.9     F02.80        RECOMMENDATIONS:                                                          --Patient is to take all scheduled medications on the day of surgery EXCEPT for modifications listed below.    APPROVAL GIVEN to proceed with proposed procedure, without further diagnostic evaluation       Signed Electronically by: Manuelito Ansair MD    Copy of this evaluation report is provided to requesting physician.    [place] Preop Guidelines

## 2018-02-19 NOTE — NURSING NOTE
"Chief Complaint   Patient presents with     Pre-Op Exam       Initial /68  Pulse 52  Temp 97  F (36.1  C) (Tympanic)  Resp 13  Wt 168 lb (76.2 kg)  BMI 25.73 kg/m2 Estimated body mass index is 25.73 kg/(m^2) as calculated from the following:    Height as of 12/27/17: 5' 7.76\" (1.721 m).    Weight as of this encounter: 168 lb (76.2 kg).  Medication Reconciliation: complete   Margi Pinto MA student     "

## 2018-02-19 NOTE — MR AVS SNAPSHOT
After Visit Summary   2/19/2018    Sylvester Davis    MRN: [id number]           Patient Information     Date Of Birth          12/3/1931        Visit Information        Provider Department      2/19/2018 1:30 PM Manuelito Ansari MD [facility]        Today's Diagnoses     Preop general physical exam    -  1    Other age-related cataract of both eyes        Anemia in chronic kidney disease, unspecified CKD stage        CKD (chronic kidney disease) stage 3, GFR 30-59 ml/min        Alzheimer's dementia without behavioral disturbance, unspecified timing of dementia onset          Care Instructions      Before Your Surgery      Call your surgeon if there is any change in your health. This includes signs of a cold or flu (such as a sore throat, runny nose, cough, rash or fever).    Do not smoke, drink alcohol or take over the counter medicine (unless your surgeon or primary care doctor tells you to) for the 24 hours before and after surgery.    If you take prescribed drugs: Follow your doctor s orders about which medicines to take and which to stop until after surgery.    Eating and drinking prior to surgery: follow the instructions from your surgeon    Take a shower or bath the night before surgery. Use the soap your surgeon gave you to gently clean your skin. If you do not have soap from your surgeon, use your regular soap. Do not shave or scrub the surgery site.  Wear clean pajamas and have clean sheets on your bed.           Follow-ups after your visit        Who to contact     If you have questions or need follow up information about today's clinic visit or your schedule please contact [facility] directly at [phone].  Normal or non-critical lab and imaging results will be communicated to you by MyChart, letter or phone within 4 business days after the clinic has received the results. If you do not hear from us within 7 days,  "please contact the clinic through [employer] or phone. If you have a critical or abnormal lab result, we will notify you by phone as soon as possible.  Submit refill requests through [employer] or call your pharmacy and they will forward the refill request to us. Please allow 3 business days for your refill to be completed.          Additional Information About Your Visit        [employer] Information     [employer] lets you send messages to your doctor, view your test results, renew your prescriptions, schedule appointments and more. To sign up, go to [email or web address] . Click on \"Log in\" on the left side of the screen, which will take you to the Welcome page. Then click on \"Sign up Now\" on the right side of the page.     You will be asked to enter the access code listed below, as well as some personal information. Please follow the directions to create your username and password.     Your access code is: [email or web address]  Expires: 2018  5:19 PM     Your access code will  in 90 days. If you need help or a new code, please call your [place] clinic or [phone].        Care EveryWhere ID     This is your Care EveryWhere ID. This could be used by other organizations to access your [place] medical records  [id number]        Your Vitals Were     Pulse Temperature Respirations BMI (Body Mass Index)          52 97  F (36.1  C) (Tympanic) 13 25.73 kg/m2         Blood Pressure from Last 3 Encounters:   18 138/68   17 136/78   10/07/17 138/66    Weight from Last 3 Encounters:   18 168 lb (76.2 kg)   17 168 lb 4.8 oz (76.3 kg)   10/07/17 169 lb 8 oz (76.9 kg)              Today, you had the following     No orders found for display       Primary Care Provider Office Phone # Fax #    Manuelito Ansari [phone] [address]  [facility] [address]        Equal Access to Services     LARRY GAGE AH: Hadii ayad dinho Joel, waaxda mehreen, qaybta debialmarhianna " davis vallejoderrell ronaldoakash wareaan ah. So [facility] [phone].    ATENCIÓN: Si habla shawna, tiene a godinez disposición servicios gratuitos de asistencia lingüística. Adriane al [phone].    We comply with applicable federal civil rights laws and [place] laws. We do not discriminate on the basis of race, color, national origin, age, disability, sex, sexual orientation, or gender identity.            Thank you!     Thank you for choosing [facility]  for your care. Our goal is always to provide you with excellent care. Hearing back from our patients is one way we can continue to improve our services. Please take a few minutes to complete the written survey that you may receive in the mail after your visit with us. Thank you!             Your Updated Medication List - Protect others around you: Learn how to safely use, store and throw away your medicines at www.disposemymeds.org.          This list is accurate as of 2/19/18  5:19 PM.  Always use your most recent med list.                   Brand Name Dispense Instructions for use Diagnosis    aspirin 81 MG tablet      Take 81 mg by mouth daily.        DIGOX 125 MCG tablet   Generic drug:  digoxin     90 tablet    TAKE 1 TABLET BY MOUTH DAILY    Chronic systolic heart failure (H)       memantine 10 MG tablet    NAMENDA    180 tablet    TAKE 1 TABLET BY MOUTH TWICE DAILY    Dementia       metoprolol succinate 25 MG 24 hr tablet    TOPROL-XL    45 tablet    TAKE 1/2 TABLET BY MOUTH DAILY    Essential hypertension       OXYBUTYNIN CHLORIDE PO      Take 5 mg by mouth daily        simvastatin 20 MG tablet    ZOCOR    90 tablet    TAKE 1 TABLET BY MOUTH AT BEDTIME    Hyperlipidemia LDL goal <130       tamsulosin 0.4 MG capsule    FLOMAX    90 capsule    TAKE 1 CAPSULE BY MOUTH EVERY DAY    History of prostate cancer       torsemide 10 MG tablet    DEMADEX    90 tablet    Take 1 tablet (10 mg) by mouth daily    Chronic systolic  congestive heart failure (H)

## 2018-02-27 ENCOUNTER — TELEPHONE (OUTPATIENT)
Dept: FAMILY MEDICINE | Facility: CLINIC | Age: 83
End: 2018-02-27

## 2018-02-27 NOTE — TELEPHONE ENCOUNTER
Son is calling to ask what the patient can take for pain, does he have any limitations on what he can take due to his heart disease?     Can he take all over the counter pain reliever?     Not on Coumadin

## 2018-02-28 NOTE — TELEPHONE ENCOUNTER
"Call made back to patient's son, Bj, a message picked up saying \"the memory is full, please enter the remote access code\" and then the call ended.     Will recall tomorrow.   "

## 2018-05-01 DIAGNOSIS — Z85.46 HISTORY OF PROSTATE CANCER: ICD-10-CM

## 2018-05-02 RX ORDER — TAMSULOSIN HYDROCHLORIDE 0.4 MG/1
CAPSULE ORAL
Qty: 90 CAPSULE | Refills: 2 | Status: SHIPPED | OUTPATIENT
Start: 2018-05-02 | End: 2019-02-25

## 2018-05-02 RX ORDER — OXYBUTYNIN CHLORIDE 5 MG/1
TABLET ORAL
Qty: 180 TABLET | Refills: 0 | Status: SHIPPED | OUTPATIENT
Start: 2018-05-02 | End: 2018-08-02

## 2018-05-02 NOTE — TELEPHONE ENCOUNTER
"Requested Prescriptions   Pending Prescriptions Disp Refills     oxybutynin (DITROPAN) 5 MG tablet [Pharmacy Med Name: OXYBUTYNIN 5MG TABLETS]  Last Written Prescription Date:  10/10/16  Last Fill Quantity: 180,  # refills: 2   Last office visit: 2/19/2018 with prescribing provider:  Coty   Future Office Visit:     180 tablet 0     Sig: TAKE 1 TABLET BY MOUTH TWICE DAILY    Muscarinic Antagonists (Urinary Incontinence Agents) Passed    5/1/2018  2:55 PM       Passed - Recent (12 mo) or future (30 days) visit within the authorizing provider's specialty    Patient had office visit in the last 12 months or has a visit in the next 30 days with authorizing provider or within the authorizing provider's specialty.  See \"Patient Info\" tab in [employer], or \"Choose Columns\" in Meds & Orders section of the refill encounter.           Passed - Medication is Oxybutynin and patient is 5 years of age or older       Passed - Patient does not have a diagnosis of glaucoma on the problem list    If glaucoma diagnosis is new, refer refill to physician.         Passed - Patient is 18 years of age or older        tamsulosin (FLOMAX) 0.4 MG capsule [Pharmacy Med Name: TAMSULOSIN 0.4MG CAPSULES]  Last Written Prescription Date:  7/20/17  Last Fill Quantity: 90,  # refills: 1   Last office visit: 2/19/2018 with prescribing provider:  Coty   Future Office Visit:     90 capsule 0     Sig: TAKE 1 CAPSULE BY MOUTH EVERY DAY    Alpha Blockers Passed    5/1/2018  2:55 PM       Passed - Blood pressure under 140/90 in past 12 months    BP Readings from Last 3 Encounters:   02/19/18 138/68   12/27/17 136/78   10/07/17 138/66                Passed - Recent (12 mo) or future (30 days) visit within the authorizing provider's specialty    Patient had office visit in the last 12 months or has a visit in the next 30 days with authorizing provider or within the authorizing provider's specialty.  See \"Patient Info\" tab in [employer], or \"Choose " "Columns\" in Meds & Orders section of the refill encounter.           Passed - Patient does not have Tadalafil, Vardenafil, or Sildenafil on their medication list       Passed - Patient is 18 years of age or older          "

## 2018-06-25 ENCOUNTER — OFFICE VISIT (OUTPATIENT)
Dept: CARDIOLOGY | Facility: CLINIC | Age: 83
End: 2018-06-25
Attending: INTERNAL MEDICINE
Payer: COMMERCIAL

## 2018-06-25 VITALS
OXYGEN SATURATION: 97 % | DIASTOLIC BLOOD PRESSURE: 70 MMHG | HEART RATE: 68 BPM | SYSTOLIC BLOOD PRESSURE: 140 MMHG | WEIGHT: 171 LBS | HEIGHT: 68 IN | BODY MASS INDEX: 25.91 KG/M2

## 2018-06-25 DIAGNOSIS — I50.42 CHRONIC COMBINED SYSTOLIC AND DIASTOLIC HEART FAILURE (H): ICD-10-CM

## 2018-06-25 LAB
ANION GAP SERPL CALCULATED.3IONS-SCNC: 12.9 MMOL/L (ref 6–17)
BUN SERPL-MCNC: 13 MG/DL (ref 7–30)
CALCIUM SERPL-MCNC: 9.2 MG/DL (ref 8.5–10.5)
CHLORIDE SERPL-SCNC: 104 MMOL/L (ref 98–107)
CO2 SERPL-SCNC: 25 MMOL/L (ref 23–29)
CREAT SERPL-MCNC: 1.17 MG/DL (ref 0.7–1.3)
GFR SERPL CREATININE-BSD FRML MDRD: 59 ML/MIN/1.7M2
GLUCOSE SERPL-MCNC: 108 MG/DL (ref 70–105)
POTASSIUM SERPL-SCNC: 3.9 MMOL/L (ref 3.5–5.1)
SODIUM SERPL-SCNC: 138 MMOL/L (ref 136–145)

## 2018-06-25 PROCEDURE — 36415 COLL VENOUS BLD VENIPUNCTURE: CPT | Performed by: NURSE PRACTITIONER

## 2018-06-25 PROCEDURE — 99214 OFFICE O/P EST MOD 30 MIN: CPT | Performed by: NURSE PRACTITIONER

## 2018-06-25 PROCEDURE — 80048 BASIC METABOLIC PNL TOTAL CA: CPT | Performed by: NURSE PRACTITIONER

## 2018-06-25 NOTE — NURSING NOTE
The After Visit Summary was reviewed with the patient following their office visit with Alia Hernandez. Patient was educated about any medication changes, the importance of following a low sodium diet, importance of recording daily weights, and when to call [facility] clinic. Patient verbalized understanding of the information and agrees to call with any questions or concerns.     Labs: Labs to be done after office visit      Return appointment: Follow up with  and bmp lab 12/2018    Medication changes:   No medication changes today      Rhonda Karimi, RN  [facility] RN Care Coordinator  [facility]  [phone]

## 2018-06-25 NOTE — PROGRESS NOTES
Service Date: 06/25/2018      HISTORY OF PRESENT ILLNESS:  Mr. Davis is a pleasant 86-year-old gentleman.  He suffers from dementia.  It has been about a year since I have seen him.  He lives with his son and daughter-in-law.  Most of the questions directed to Sylvester he was unable to answer and his son has to step in and answer questions.  His past medical history includes atrial fibrillation with adequate rate control.  Previously he was on warfarin therapy, but suffered an intracerebral bleed.  Therefore, he has not been on warfarin for thromboembolic prophylaxis.  In the past, the Watchman device was presented because of his high CHADS-VASc score and contraindication to anticoagulation, but this patient and his son declined the procedure.  He has been doing well with his heart failure with preserved ejection fraction.  He has a history of moderate aortic stenosis and finally hypertension.  Since we have seen him last, he has had cataract surgery.  His last echo was done in the fall of 2016.  It demonstrated a normal left ventricular function and ejection fraction.  There was mild to moderate concentric left ventricular hypertrophy.  E/E prime ratio was greater than 15, suggestive of increased left ventricular filling pressures.  Left atrium was severely dilated.  Right atrium was moderately dilated.  There was mild mitral regurgitation, mild tricuspid regurgitation with moderate aortic stenosis.  The calculated aortic valve area was 0.97.  The mean aortic valve pressure gradient was 20, mild aortic root dilatation 3.9.  The patient is on a low dose of torsemide at 10 mg per day.  Today, he denies any problems with increased shortness of breath, lower leg swelling, abdominal fullness, paroxysmal nocturnal dyspnea.  He denies any chest pain or chest pressure.  He is not very active due to osteoarthritis, bilateral knees.  These questions were answered by his son.  In review, he has had no labwork drawn since  last fall.        Medications were reviewed with his son who sets up his medications.  Currently he is on:   1.  Aspirin 81 mg daily.   2.  Digoxin 1.25 mcg daily.   3.  Namenda 10 mg twice daily.   4.  Metoprolol succinate 12.5 mg daily.   5.  Zocor 20 mg daily.   6.  Torsemide 10 mg daily.        Please see review of systems and physical exam.      IMPRESSION AND PLAN:   1.  Patient with heart failure with a preserved ejection fraction.  Today he does appear euvolemic.  He is very sedentary due to his arthritis than from heart failure symptomatology.  Blood pressure, heart rate appear within normal limits.  He has no signs of fluid overload.  I have ordered a basic metabolic panel.  I will review the results and call the son if medication changes are necessary.   2.  Paroxysmal atrial fibrillation.  His heart rate is regular today.  He is not on warfarin therapy for reasons cited above.  He appears to have good rate control.  It has been my pleasure to see Medardo again.  He will follow up with Dr. Mcclain in 6 months.   3.  Hypertension.  His blood pressure looks well managed.   4.  Moderate aortic stenosis, stable.  Dr. Mcclain can make a decision if a repeat echo would be necessary when he sees him at the end of 2018.  That would be a 2 year interval since his last echo.  Given Medardo's severe dementia, conservative measures would seem appropriate.         FARHAN العراقي, CNP             D: 2018   T: 2018   MT: BEST      Name:     LEONCIO CORDERO   MRN:      [id number]        Account:      [id number]   :      1931           Service Date: 2018      Document: [id number]

## 2018-06-25 NOTE — LETTER
6/25/2018      Manuelito Ansari MD  [address]  [facility] [address]      RE: Sylvester Davis       Dear Colleague,    I had the pleasure of seeing Sylvester Davis in the [facility].    Service Date: 06/25/2018      HISTORY OF PRESENT ILLNESS:  Mr. Davis is a pleasant 86-year-old gentleman.  He suffers from dementia.  It has been about a year since I have seen him.  He lives with his son and daughter-in-law.  Most of the questions directed to Sylvester he was unable to answer and his son has to step in and answer questions.  His past medical history includes atrial fibrillation with adequate rate control.  Previously he was on warfarin therapy, but suffered an intracerebral bleed.  Therefore, he has not been on warfarin for thromboembolic prophylaxis.  In the past, the Watchman device was presented because of his high CHADS-VASc score and contraindication to anticoagulation, but this patient and his son declined the procedure.  He has been doing well with his heart failure with preserved ejection fraction.  He has a history of moderate aortic stenosis and finally hypertension.  Since we have seen him last, he has had cataract surgery.  His last echo was done in the fall of 2016.  It demonstrated a normal left ventricular function and ejection fraction.  There was mild to moderate concentric left ventricular hypertrophy.  E/E prime ratio was greater than 15, suggestive of increased left ventricular filling pressures.  Left atrium was severely dilated.  Right atrium was moderately dilated.  There was mild mitral regurgitation, mild tricuspid regurgitation with moderate aortic stenosis.  The calculated aortic valve area was 0.97.  The mean aortic valve pressure gradient was 20, mild aortic root dilatation 3.9.  The patient is on a low dose of torsemide at 10 mg per day.  Today, he denies any problems with increased shortness of breath, lower leg swelling,  abdominal fullness, paroxysmal nocturnal dyspnea.  He denies any chest pain or chest pressure.  He is not very active due to osteoarthritis, bilateral knees.  These questions were answered by his son.  In review, he has had no labwork drawn since last fall.        Medications were reviewed with his son who sets up his medications.  Currently he is on:   1.  Aspirin 81 mg daily.   2.  Digoxin 1.25 mcg daily.   3.  Namenda 10 mg twice daily.   4.  Metoprolol succinate 12.5 mg daily.   5.  Zocor 20 mg daily.   6.  Torsemide 10 mg daily.        Please see review of systems and physical exam.      IMPRESSION AND PLAN:   1.  Patient with heart failure with a preserved ejection fraction.  Today he does appear euvolemic.  He is very sedentary due to his arthritis than from heart failure symptomatology.  Blood pressure, heart rate appear within normal limits.  He has no signs of fluid overload.  I have ordered a basic metabolic panel.  I will review the results and call the son if medication changes are necessary.   2.  Paroxysmal atrial fibrillation.  His heart rate is regular today.  He is not on warfarin therapy for reasons cited above.  He appears to have good rate control.  It has been my pleasure to see Medardo again.  He will follow up with Dr. Mcclain in 6 months.   3.  Hypertension.  His blood pressure looks well managed.   4.  Moderate aortic stenosis, stable.  Dr. Mcclain can make a decision if a repeat echo would be necessary when he sees him at the end of 2018.  That would be a 2 year interval since his last echo.  Given Medardo's severe dementia, conservative measures would seem appropriate.         FARHAN العراقي, JASMYNE             D: 2018   T: 2018   MT: BEST      Name:     LEONCIO CORDERO   MRN:      [id number]        Account:      [id number]   :      1931           Service Date: 2018      Document: [id number]           Outpatient Encounter Prescriptions as of 2018   Medication Sig  Dispense Refill     aspirin 81 MG tablet Take 81 mg by mouth daily.       DIGOX 125 MCG tablet TAKE 1 TABLET BY MOUTH DAILY 90 tablet 3     memantine (NAMENDA) 10 MG tablet TAKE 1 TABLET BY MOUTH TWICE DAILY 180 tablet 3     metoprolol succinate (TOPROL-XL) 25 MG 24 hr tablet TAKE 1/2 TABLET BY MOUTH DAILY 45 tablet 2     oxybutynin (DITROPAN) 5 MG tablet TAKE 1 TABLET BY MOUTH TWICE DAILY 180 tablet 0     OXYBUTYNIN CHLORIDE PO Take 5 mg by mouth daily       simvastatin (ZOCOR) 20 MG tablet TAKE 1 TABLET BY MOUTH AT BEDTIME 90 tablet 3     tamsulosin (FLOMAX) 0.4 MG capsule TAKE 1 CAPSULE BY MOUTH EVERY DAY 90 capsule 2     torsemide (DEMADEX) 10 MG tablet Take 1 tablet (10 mg) by mouth daily 90 tablet 1     No facility-administered encounter medications on file as of 6/25/2018.        Again, thank you for allowing me to participate in the care of your patient.      Sincerely,    FARHAN Reddy [facility]

## 2018-06-25 NOTE — LETTER
6/25/2018    Manuelito Ansari MD  [address]  [facility] [address]    RE: Sylvester Davis       Dear Colleague,    I had the pleasure of seeing Sylvester Davis in the [facility].    HPI and Plan:   See dictation    Orders Placed This Encounter   Procedures     Basic metabolic panel     Basic metabolic panel     Follow-Up with Cardiologist     No orders of the defined types were placed in this encounter.    There are no discontinued medications.      Encounter Diagnosis   Name Primary?     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       DIGOX 125 MCG tablet TAKE 1 TABLET BY MOUTH DAILY 90 tablet 3     memantine (NAMENDA) 10 MG tablet TAKE 1 TABLET BY MOUTH TWICE DAILY 180 tablet 3     metoprolol succinate (TOPROL-XL) 25 MG 24 hr tablet TAKE 1/2 TABLET BY MOUTH DAILY 45 tablet 2     oxybutynin (DITROPAN) 5 MG tablet TAKE 1 TABLET BY MOUTH TWICE DAILY 180 tablet 0     OXYBUTYNIN CHLORIDE PO Take 5 mg by mouth daily       simvastatin (ZOCOR) 20 MG tablet TAKE 1 TABLET BY MOUTH AT BEDTIME 90 tablet 3     tamsulosin (FLOMAX) 0.4 MG capsule TAKE 1 CAPSULE BY MOUTH EVERY DAY 90 capsule 2     torsemide (DEMADEX) 10 MG tablet Take 1 tablet (10 mg) by mouth daily 90 tablet 1       ALLERGIES     Allergies   Allergen Reactions     Fish Allergy      Coumadin        PAST MEDICAL HISTORY:  Past Medical History:   Diagnosis Date     Aortic valve disorders     moderate aortic stenosis & 1+ AR per 8/2014 echo     Atrial fibrillation, chronic (H)     Sees cardiologist for meds and testing such as digoxin med/levels      Atrial flutter (H)      Cardiomyopathy (H)     resolved     CHF (congestive heart failure) (H) 10/26/2012     Dementia     chronic memory loss, son saima states he is poa     History of prostate cancer  "10/26/2012     Hyperlipidemia LDL goal < 130      Hypertension      Intracranial bleed (H)     while on coumadin     LVH (left ventricular hypertrophy)     mild to moderate per 8/2014 echo     Memory loss, long term 10/26/2012    chronic memory loss, son saima states he is poa     Mitral regurgitation     1+ per 8/2014 echo     Sick sinus syndrome (H)      Unspecified cerebral artery occlusion with cerebral infarction      Urinary retention        PAST SURGICAL HISTORY:  Past Surgical History:   Procedure Laterality Date     GENITOURINARY SURGERY  2010    Resection of prostate     HEAD & NECK SURGERY  2009    Brain swelling - stopped coumadin but kept on aspirin per cardiology     wisdom teeth removal         FAMILY HISTORY:  Family History   Problem Relation Age of Onset     Cancer Mother        SOCIAL HISTORY:  Social History     Social History     Marital status:      Spouse name: N/A     Number of children: N/A     Years of education: N/A     Occupational History      Retired     [employer]     Social History Main Topics     Smoking status: Former Smoker     Packs/day: 1.00     Years: 10.00     Types: Cigarettes     Smokeless tobacco: Never Used     Alcohol use No     Drug use: No     Sexual activity: Not Currently     Other Topics Concern     Parent/Sibling W/ Cabg, Mi Or Angioplasty Before 65f 55m? No     Exercise Yes     cycling      Seat Belt Yes     Social History Narrative       Review of Systems:  Skin:  Negative     Eyes:  Negative    ENT:  Negative    Respiratory:  Negative    Cardiovascular:  Negative Positive for  Gastroenterology: Negative    Genitourinary:  Negative    Musculoskeletal:  Positive for arthritis  Neurologic:  Positive for memory problems  Psychiatric:  Negative for sleep disturbances  Heme/Lymph/Imm:  Negative chills;fever  Endocrine:  Negative      Physical Exam:  Vitals: /70  Pulse 68  Ht 1.727 m (5' 8\")  Wt 77.6 kg (171 lb)  SpO2 97%  BMI 26 " kg/m2    Constitutional:  cooperative;well developed;well nourished   oriented to self only    Skin:  warm and dry to the touch          Head:           Eyes:  not assessed this visit        Lymph:      ENT:  no pallor or cyanosis        Neck:  carotid pulses are full and equal bilaterally        Respiratory:  clear to auscultation;normal breath sounds, clear to auscultation, normal A-P diameter, normal symmetry, normal respiratory excursion, no use of accessory muscles         Cardiac: normal S1 and S2 irregular rhythm           S1 normal, S2 normal, S1,S2, no S3 or S4 present, III/VI systolic murmur heard over the aortic area                                           GI:  abdomen soft        Extremities and Muscular Skeletal:  no edema   bilateral LE edema;trace          Neurological:           Psych:  Alert and Oriented x 3        Recent Lab Results:  LIPID RESULTS:  Lab Results   Component Value Date    CHOL 121 10/07/2017    HDL 40 10/07/2017    LDL 47 10/07/2017    TRIG 169 (H) 10/07/2017    CHOLHDLRATIO 3.3 10/30/2015       LIVER ENZYME RESULTS:  Lab Results   Component Value Date    AST 15 10/07/2017    ALT 16 10/07/2017       CBC RESULTS:  Lab Results   Component Value Date    WBC 5.1 10/07/2017    RBC 3.95 (L) 10/07/2017    HGB 12.3 (L) 10/07/2017    HCT 36.6 (L) 10/07/2017    MCV 93 10/07/2017    MCH 31.1 10/07/2017    MCHC 33.6 10/07/2017    RDW 13.4 10/07/2017     10/07/2017       BMP RESULTS:  Lab Results   Component Value Date     10/07/2017    POTASSIUM 4.3 10/07/2017    CHLORIDE 106 10/07/2017    CO2 29 10/07/2017    ANIONGAP 6 10/07/2017     (H) 10/07/2017    BUN 12 10/07/2017    CR 1.26 (H) 10/07/2017    GFRESTIMATED 54 (L) 10/07/2017    GFRESTBLACK 66 10/07/2017    TY 8.9 10/07/2017        A1C RESULTS:  No results found for: A1C    INR RESULTS:  Lab Results   Component Value Date    INR 1.84 (H) 01/04/2010    INR 3.13 (H) 11/30/2009           CC  Manuelito Ansari,  MD  [address] [place] [address]                  Thank you for allowing me to participate in the care of your patient.      Sincerely,     FARHAN Reddy [facility]    cc:   Manuelito Ansari MD  [address] [place] [address]

## 2018-06-25 NOTE — PROGRESS NOTES
HPI and Plan:   See dictation    Orders Placed This Encounter   Procedures     Basic metabolic panel     Basic metabolic panel     Follow-Up with Cardiologist     No orders of the defined types were placed in this encounter.    There are no discontinued medications.      Encounter Diagnosis   Name Primary?     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       DIGOX 125 MCG tablet TAKE 1 TABLET BY MOUTH DAILY 90 tablet 3     memantine (NAMENDA) 10 MG tablet TAKE 1 TABLET BY MOUTH TWICE DAILY 180 tablet 3     metoprolol succinate (TOPROL-XL) 25 MG 24 hr tablet TAKE 1/2 TABLET BY MOUTH DAILY 45 tablet 2     oxybutynin (DITROPAN) 5 MG tablet TAKE 1 TABLET BY MOUTH TWICE DAILY 180 tablet 0     OXYBUTYNIN CHLORIDE PO Take 5 mg by mouth daily       simvastatin (ZOCOR) 20 MG tablet TAKE 1 TABLET BY MOUTH AT BEDTIME 90 tablet 3     tamsulosin (FLOMAX) 0.4 MG capsule TAKE 1 CAPSULE BY MOUTH EVERY DAY 90 capsule 2     torsemide (DEMADEX) 10 MG tablet Take 1 tablet (10 mg) by mouth daily 90 tablet 1       ALLERGIES     Allergies   Allergen Reactions     Fish Allergy      Coumadin        PAST MEDICAL HISTORY:  Past Medical History:   Diagnosis Date     Aortic valve disorders     moderate aortic stenosis & 1+ AR per 8/2014 echo     Atrial fibrillation, chronic (H)     Sees cardiologist for meds and testing such as digoxin med/levels      Atrial flutter (H)      Cardiomyopathy (H)     resolved     CHF (congestive heart failure) (H) 10/26/2012     Dementia     chronic memory loss, son saima states he is poa     History of prostate cancer 10/26/2012     Hyperlipidemia LDL goal < 130      Hypertension      Intracranial bleed (H)     while on coumadin     LVH (left ventricular hypertrophy)     mild to moderate per 8/2014 echo     Memory loss, long term 10/26/2012    chronic  "memory loss, son saima states he is poa     Mitral regurgitation     1+ per 8/2014 echo     Sick sinus syndrome (H)      Unspecified cerebral artery occlusion with cerebral infarction      Urinary retention        PAST SURGICAL HISTORY:  Past Surgical History:   Procedure Laterality Date     GENITOURINARY SURGERY  2010    Resection of prostate     HEAD & NECK SURGERY  2009    Brain swelling - stopped coumadin but kept on aspirin per cardiology     wisdom teeth removal         FAMILY HISTORY:  Family History   Problem Relation Age of Onset     Cancer Mother        SOCIAL HISTORY:  Social History     Social History     Marital status:      Spouse name: N/A     Number of children: N/A     Years of education: N/A     Occupational History      Retired     [employer]     Social History Main Topics     Smoking status: Former Smoker     Packs/day: 1.00     Years: 10.00     Types: Cigarettes     Smokeless tobacco: Never Used     Alcohol use No     Drug use: No     Sexual activity: Not Currently     Other Topics Concern     Parent/Sibling W/ Cabg, Mi Or Angioplasty Before 65f 55m? No     Exercise Yes     cycling      Seat Belt Yes     Social History Narrative       Review of Systems:  Skin:  Negative     Eyes:  Negative    ENT:  Negative    Respiratory:  Negative    Cardiovascular:  Negative Positive for  Gastroenterology: Negative    Genitourinary:  Negative    Musculoskeletal:  Positive for arthritis  Neurologic:  Positive for memory problems  Psychiatric:  Negative for sleep disturbances  Heme/Lymph/Imm:  Negative chills;fever  Endocrine:  Negative      Physical Exam:  Vitals: /70  Pulse 68  Ht 1.727 m (5' 8\")  Wt 77.6 kg (171 lb)  SpO2 97%  BMI 26 kg/m2    Constitutional:  cooperative;well developed;well nourished   oriented to self only    Skin:  warm and dry to the touch          Head:           Eyes:  not assessed this visit        Lymph:      ENT:  no pallor or cyanosis        Neck:  carotid " pulses are full and equal bilaterally        Respiratory:  clear to auscultation;normal breath sounds, clear to auscultation, normal A-P diameter, normal symmetry, normal respiratory excursion, no use of accessory muscles         Cardiac: normal S1 and S2 irregular rhythm           S1 normal, S2 normal, S1,S2, no S3 or S4 present, III/VI systolic murmur heard over the aortic area                                           GI:  abdomen soft        Extremities and Muscular Skeletal:  no edema   bilateral LE edema;trace          Neurological:           Psych:  Alert and Oriented x 3        Recent Lab Results:  LIPID RESULTS:  Lab Results   Component Value Date    CHOL 121 10/07/2017    HDL 40 10/07/2017    LDL 47 10/07/2017    TRIG 169 (H) 10/07/2017    CHOLHDLRATIO 3.3 10/30/2015       LIVER ENZYME RESULTS:  Lab Results   Component Value Date    AST 15 10/07/2017    ALT 16 10/07/2017       CBC RESULTS:  Lab Results   Component Value Date    WBC 5.1 10/07/2017    RBC 3.95 (L) 10/07/2017    HGB 12.3 (L) 10/07/2017    HCT 36.6 (L) 10/07/2017    MCV 93 10/07/2017    MCH 31.1 10/07/2017    MCHC 33.6 10/07/2017    RDW 13.4 10/07/2017     10/07/2017       BMP RESULTS:  Lab Results   Component Value Date     10/07/2017    POTASSIUM 4.3 10/07/2017    CHLORIDE 106 10/07/2017    CO2 29 10/07/2017    ANIONGAP 6 10/07/2017     (H) 10/07/2017    BUN 12 10/07/2017    CR 1.26 (H) 10/07/2017    GFRESTIMATED 54 (L) 10/07/2017    GFRESTBLACK 66 10/07/2017    TY 8.9 10/07/2017        A1C RESULTS:  No results found for: A1C    INR RESULTS:  Lab Results   Component Value Date    INR 1.84 (H) 01/04/2010    INR 3.13 (H) 11/30/2009           CC  Maneulito Ansari MD  [address]  [place] [address]

## 2018-06-25 NOTE — MR AVS SNAPSHOT
After Visit Summary   6/25/2018    Sylvester Davis    MRN: [id number]           Patient Information     Date Of Birth          12/3/1931        Visit Information        Provider Department      6/25/2018 1:50 PM Alia Hernandez, FARHAN MEDLEY [facility]        Today's Diagnoses     Chronic combined systolic and diastolic heart failure (H)          Care Instructions    Call CORE nurse for any questions or concerns:  [phone]   *If you have concerns after hours, please call [phone], option 2 to speak with on call Cardiologist.    1. Medication changes and/or recommendations from today:    No medication changes    2. Follow up plan: Follow up with  and bmp lab 12/2018     3. Weigh yourself daily and write it down.     4. Call CORE nurse if your weight is up more than 2 pounds in one day or 5 pounds in one week.     5. Call CORE nurse if you feel more short of breath, have more abdominal bloating, or leg swelling.     6. Continue low sodium diet (less than 2000 mg daily). If you eat less salt, you will retain less fluid.     7. Alcohol can weaken your heart further. You should avoid alcohol or limit its use to special times, such as a holiday or birthday.      8. Do NOT take Aleve or ibuprofen without talking to your doctor first.      9. Lab Results: To be done after appointment. We will call you with results.     [facility]: Cardiomyopathy, Optimization, Rehabilitation, Education  The [facility] Clinic is a heart failure specialty clinic within the [facility] where you will work with specialized nurse practitioners, physician assistants, doctors, and registered nurses. They are dedicated to helping patients with heart failure to carefully adjust medications, receive education, and learn who and when to call if symptoms develop. They specialize in helping you better understand your condition, slow the progression of your disease, improve the  length and quality of your life, help you detect future heart problems before they become life threatening, and avoid hospitalizations.                       Follow-ups after your visit        Additional Services     Follow-Up with Cardiologist                 Your next 10 appointments already scheduled     Jun 25, 2018  2:50 PM CDT   LAB with [facility]   [facility] [place] ([facility])    [address] [place] [address] [address]  [place]  [place] [id number]   [phone]           Please do not eat 10-12 hours before your appointment if you are coming in fasting for labs on lipids, cholesterol, or glucose (sugar). This does not apply to pregnant women. Water, hot tea and black coffee (with nothing added) are okay. Do not drink other fluids, diet soda or chew gum.              Future tests that were ordered for you today     Open Future Orders        Priority Expected Expires Ordered    Basic metabolic panel Routine 12/22/2018 6/25/2019 6/25/2018    Follow-Up with Cardiologist Routine 12/22/2018 6/25/2019 6/25/2018    Basic metabolic panel Routine 6/25/2018 6/25/2019 6/25/2018            Who to contact     If you have questions or need follow up information about today's clinic visit or your schedule please contact [facility]   [place] directly at [phone].  Normal or non-critical lab and imaging results will be communicated to you by MyChart, letter or phone within 4 business days after the clinic has received the results. If you do not hear from us within 7 days, please contact the clinic through MyChart or phone. If you have a critical or abnormal lab result, we will notify you by phone as soon as possible.  Submit refill requests through Deeplink or call your pharmacy and they will forward the refill request to us. Please allow 3 business days for your refill to be completed.          Additional Information About Your Visit        MyChart Information      "Quibly lets you send messages to your doctor, view your test results, renew your prescriptions, schedule appointments and more. To sign up, go to [email or web address] . Click on \"Log in\" on the left side of the screen, which will take you to the Welcome page. Then click on \"Sign up Now\" on the right side of the page.     You will be asked to enter the access code listed below, as well as some personal information. Please follow the directions to create your username and password.     Your access code is: [email or web address]  Expires: 2018  2:24 PM     Your access code will  in 90 days. If you need help or a new code, please call your [facility] clinic or [phone].        Care EveryWhere ID     This is your Care EveryWhere ID. This could be used by other organizations to access your [facility] medical records  [id number]        Your Vitals Were     Pulse Height Pulse Oximetry BMI (Body Mass Index)          68 1.727 m (5' 8\") 97% 26 kg/m2         Blood Pressure from Last 3 Encounters:   18 140/70   18 138/68   17 136/78    Weight from Last 3 Encounters:   18 77.6 kg (171 lb)   18 76.2 kg (168 lb)   17 76.3 kg (168 lb 4.8 oz)              We Performed the Following     Follow-Up with Cardiac Advanced Practice Provider        Primary Care Provider Office Phone # Fax #    Manuelito Lars Ansari [phone] [address] [facility] [facility] [address]        Equal Access to Services     LARRY GAGE : Hadguy Maldonado, jasonda mehreen, qaybta debialdavis cordova. So [facility] [phone].    ATENCIÓN: Si habla español, tiene a godinez disposición servicios gratuitos de asistencia lingüística. Adriane al [phone].    We comply with applicable federal civil rights laws and [place] laws. We do not discriminate on the basis of race, color, national origin, age, disability, sex, sexual orientation, or gender " identity.            Thank you!     Thank you for choosing [facility]  for your care. Our goal is always to provide you with excellent care. Hearing back from our patients is one way we can continue to improve our services. Please take a few minutes to complete the written survey that you may receive in the mail after your visit with us. Thank you!             Your Updated Medication List - Protect others around you: Learn how to safely use, store and throw away your medicines at www.disposemymeds.org.          This list is accurate as of 6/25/18  2:31 PM.  Always use your most recent med list.                   Brand Name Dispense Instructions for use Diagnosis    aspirin 81 MG tablet      Take 81 mg by mouth daily.        DIGOX 125 MCG tablet   Generic drug:  digoxin     90 tablet    TAKE 1 TABLET BY MOUTH DAILY    Chronic systolic heart failure (H)       memantine 10 MG tablet    NAMENDA    180 tablet    TAKE 1 TABLET BY MOUTH TWICE DAILY    Dementia       metoprolol succinate 25 MG 24 hr tablet    TOPROL-XL    45 tablet    TAKE 1/2 TABLET BY MOUTH DAILY    Essential hypertension       * OXYBUTYNIN CHLORIDE PO      Take 5 mg by mouth daily        * oxybutynin 5 MG tablet    DITROPAN    180 tablet    TAKE 1 TABLET BY MOUTH TWICE DAILY    History of prostate cancer       simvastatin 20 MG tablet    ZOCOR    90 tablet    TAKE 1 TABLET BY MOUTH AT BEDTIME    Hyperlipidemia LDL goal <130       tamsulosin 0.4 MG capsule    FLOMAX    90 capsule    TAKE 1 CAPSULE BY MOUTH EVERY DAY    History of prostate cancer       torsemide 10 MG tablet    DEMADEX    90 tablet    Take 1 tablet (10 mg) by mouth daily    Chronic systolic congestive heart failure (H)       * Notice:  This list has 2 medication(s) that are the same as other medications prescribed for you. Read the directions carefully, and ask your doctor or other care provider to review them with you.

## 2018-06-25 NOTE — PATIENT INSTRUCTIONS
Call CORE nurse for any questions or concerns:  [phone]   *If you have concerns after hours, please call [phone], option 2 to speak with on call Cardiologist.    1. Medication changes and/or recommendations from today:    No medication changes    2. Follow up plan: Follow up with  and bmp lab 12/2018     3. Weigh yourself daily and write it down.     4. Call CORE nurse if your weight is up more than 2 pounds in one day or 5 pounds in one week.     5. Call CORE nurse if you feel more short of breath, have more abdominal bloating, or leg swelling.     6. Continue low sodium diet (less than 2000 mg daily). If you eat less salt, you will retain less fluid.     7. Alcohol can weaken your heart further. You should avoid alcohol or limit its use to special times, such as a holiday or birthday.      8. Do NOT take Aleve or ibuprofen without talking to your doctor first.      9. Lab Results: To be done after appointment. We will call you with results.     [facility]: Cardiomyopathy, Optimization, Rehabilitation, Education  The CORE Clinic is a heart failure specialty clinic within the [facility] where you will work with specialized nurse practitioners, physician assistants, doctors, and registered nurses. They are dedicated to helping patients with heart failure to carefully adjust medications, receive education, and learn who and when to call if symptoms develop. They specialize in helping you better understand your condition, slow the progression of your disease, improve the length and quality of your life, help you detect future heart problems before they become life threatening, and avoid hospitalizations.

## 2018-07-24 ENCOUNTER — OFFICE VISIT (OUTPATIENT)
Dept: FAMILY MEDICINE | Facility: CLINIC | Age: 83
End: 2018-07-24
Payer: COMMERCIAL

## 2018-07-24 ENCOUNTER — RADIANT APPOINTMENT (OUTPATIENT)
Dept: GENERAL RADIOLOGY | Facility: CLINIC | Age: 83
End: 2018-07-24
Attending: FAMILY MEDICINE
Payer: COMMERCIAL

## 2018-07-24 VITALS
DIASTOLIC BLOOD PRESSURE: 80 MMHG | SYSTOLIC BLOOD PRESSURE: 120 MMHG | OXYGEN SATURATION: 97 % | WEIGHT: 169 LBS | RESPIRATION RATE: 18 BRPM | BODY MASS INDEX: 25.61 KG/M2 | HEART RATE: 66 BPM | TEMPERATURE: 97.4 F | HEIGHT: 68 IN

## 2018-07-24 DIAGNOSIS — I10 ESSENTIAL HYPERTENSION: Chronic | ICD-10-CM

## 2018-07-24 DIAGNOSIS — Z85.46 HISTORY OF PROSTATE CANCER: ICD-10-CM

## 2018-07-24 DIAGNOSIS — C61 MALIGNANT NEOPLASM OF PROSTATE (H): ICD-10-CM

## 2018-07-24 DIAGNOSIS — L98.9 SKIN LESION: ICD-10-CM

## 2018-07-24 DIAGNOSIS — E66.3 OVERWEIGHT (BMI 25.0-29.9): ICD-10-CM

## 2018-07-24 DIAGNOSIS — I63.50 CEREBRAL ARTERY OCCLUSION WITH CEREBRAL INFARCTION (H): ICD-10-CM

## 2018-07-24 DIAGNOSIS — R73.01 IMPAIRED FASTING GLUCOSE: ICD-10-CM

## 2018-07-24 DIAGNOSIS — G30.9 ALZHEIMER'S DEMENTIA WITHOUT BEHAVIORAL DISTURBANCE, UNSPECIFIED TIMING OF DEMENTIA ONSET: ICD-10-CM

## 2018-07-24 DIAGNOSIS — Z23 NEED FOR PROPHYLACTIC VACCINATION AGAINST STREPTOCOCCUS PNEUMONIAE (PNEUMOCOCCUS): ICD-10-CM

## 2018-07-24 DIAGNOSIS — R93.89 ABNORMAL CHEST X-RAY: ICD-10-CM

## 2018-07-24 DIAGNOSIS — Z13.89 SCREENING FOR DIABETIC PERIPHERAL NEUROPATHY: ICD-10-CM

## 2018-07-24 DIAGNOSIS — I48.20 ATRIAL FIBRILLATION, CHRONIC (H): ICD-10-CM

## 2018-07-24 DIAGNOSIS — F02.80 ALZHEIMER'S DEMENTIA WITHOUT BEHAVIORAL DISTURBANCE, UNSPECIFIED TIMING OF DEMENTIA ONSET: ICD-10-CM

## 2018-07-24 DIAGNOSIS — I50.40 COMBINED SYSTOLIC AND DIASTOLIC CONGESTIVE HEART FAILURE, UNSPECIFIED CONGESTIVE HEART FAILURE CHRONICITY: ICD-10-CM

## 2018-07-24 DIAGNOSIS — R60.0 EDEMA OF HAND: Primary | ICD-10-CM

## 2018-07-24 DIAGNOSIS — F10.10 ALCOHOL ABUSE: ICD-10-CM

## 2018-07-24 DIAGNOSIS — I35.0 AORTIC VALVE STENOSIS, ETIOLOGY OF CARDIAC VALVE DISEASE UNSPECIFIED: ICD-10-CM

## 2018-07-24 DIAGNOSIS — N18.30 CKD (CHRONIC KIDNEY DISEASE) STAGE 3, GFR 30-59 ML/MIN (H): ICD-10-CM

## 2018-07-24 DIAGNOSIS — E11.21 TYPE 2 DIABETES MELLITUS WITH DIABETIC NEPHROPATHY, WITHOUT LONG-TERM CURRENT USE OF INSULIN (H): ICD-10-CM

## 2018-07-24 DIAGNOSIS — E78.2 MIXED HYPERLIPIDEMIA: ICD-10-CM

## 2018-07-24 PROBLEM — Z96.1 PSEUDOPHAKIA, LEFT EYE: Status: ACTIVE | Noted: 2018-02-28

## 2018-07-24 PROBLEM — Z96.1 PSEUDOPHAKIA, RIGHT EYE: Status: ACTIVE | Noted: 2018-04-25

## 2018-07-24 PROBLEM — H40.1190 PRIMARY OPEN ANGLE GLAUCOMA: Status: ACTIVE | Noted: 2018-04-25

## 2018-07-24 LAB
DIGOXIN SERPL-MCNC: 1.4 UG/L (ref 0.5–2)
HBA1C MFR BLD: 5.4 % (ref 0–5.6)
HGB BLD-MCNC: 11.8 G/DL (ref 13.3–17.7)

## 2018-07-24 PROCEDURE — 85018 HEMOGLOBIN: CPT | Performed by: FAMILY MEDICINE

## 2018-07-24 PROCEDURE — 84443 ASSAY THYROID STIM HORMONE: CPT | Performed by: FAMILY MEDICINE

## 2018-07-24 PROCEDURE — 36415 COLL VENOUS BLD VENIPUNCTURE: CPT | Performed by: FAMILY MEDICINE

## 2018-07-24 PROCEDURE — 82043 UR ALBUMIN QUANTITATIVE: CPT | Performed by: FAMILY MEDICINE

## 2018-07-24 PROCEDURE — 99215 OFFICE O/P EST HI 40 MIN: CPT | Mod: 25 | Performed by: FAMILY MEDICINE

## 2018-07-24 PROCEDURE — 83036 HEMOGLOBIN GLYCOSYLATED A1C: CPT | Performed by: FAMILY MEDICINE

## 2018-07-24 PROCEDURE — 93000 ELECTROCARDIOGRAM COMPLETE: CPT | Performed by: FAMILY MEDICINE

## 2018-07-24 PROCEDURE — 80162 ASSAY OF DIGOXIN TOTAL: CPT | Performed by: FAMILY MEDICINE

## 2018-07-24 PROCEDURE — 84460 ALANINE AMINO (ALT) (SGPT): CPT | Performed by: FAMILY MEDICINE

## 2018-07-24 PROCEDURE — 80061 LIPID PANEL: CPT | Performed by: FAMILY MEDICINE

## 2018-07-24 PROCEDURE — 71046 X-RAY EXAM CHEST 2 VIEWS: CPT | Mod: FY

## 2018-07-24 ASSESSMENT — ANXIETY QUESTIONNAIRES
6. BECOMING EASILY ANNOYED OR IRRITABLE: NOT AT ALL
IF YOU CHECKED OFF ANY PROBLEMS ON THIS QUESTIONNAIRE, HOW DIFFICULT HAVE THESE PROBLEMS MADE IT FOR YOU TO DO YOUR WORK, TAKE CARE OF THINGS AT HOME, OR GET ALONG WITH OTHER PEOPLE: NOT DIFFICULT AT ALL
5. BEING SO RESTLESS THAT IT IS HARD TO SIT STILL: NOT AT ALL
3. WORRYING TOO MUCH ABOUT DIFFERENT THINGS: NOT AT ALL
GAD7 TOTAL SCORE: 0
1. FEELING NERVOUS, ANXIOUS, OR ON EDGE: NOT AT ALL
7. FEELING AFRAID AS IF SOMETHING AWFUL MIGHT HAPPEN: NOT AT ALL
2. NOT BEING ABLE TO STOP OR CONTROL WORRYING: NOT AT ALL

## 2018-07-24 ASSESSMENT — PATIENT HEALTH QUESTIONNAIRE - PHQ9: 5. POOR APPETITE OR OVEREATING: NOT AT ALL

## 2018-07-24 NOTE — NURSING NOTE
"Chief Complaint   Patient presents with     Derm Problem     /80  Pulse 66  Temp 97.4  F (36.3  C) (Tympanic)  Resp 18  Ht 5' 8\" (1.727 m)  Wt 169 lb (76.7 kg)  SpO2 97%  BMI 25.7 kg/m2 Estimated body mass index is 25.7 kg/(m^2) as calculated from the following:    Height as of this encounter: 5' 8\" (1.727 m).    Weight as of this encounter: 169 lb (76.7 kg).  BP completed using cuff size: fatimah Carbajal CMA    Health Maintenance Due   Topic Date Due     FOOT EXAM Q1 YEAR  [birth year]03/1932     EYE EXAM Q1 YEAR  [birth year]/03/1932     A1C Q6 MO  [birth year]/03/1932     DEPRESSION ACTION PLAN Q1 YR  12/03/1949     PHQ-9 Q6 MONTHS  12/03/1949     ADVANCE DIRECTIVE PLANNING Q5 YRS  12/03/1986     PNEUMOCOCCAL (1 of 2 - PCV13) 12/03/1996     TSH W/ FREE T4 REFLEX Q2 YEAR  01/24/2016     DIGOXIN LEVEL Q1 YR  10/30/2016     HF ACTION PLAN Q3 YR  02/10/2017     MICROALBUMIN Q1 YEAR  08/22/2017     Health Maintenance reviewed at today's visit patient asked to schedule/complete:   Depression:  Patient agrees to schedule  Diabetes:  Patient agrees to schedule  Immunizations:  Patient agrees to schedule    "

## 2018-07-24 NOTE — LETTER
My Heart Failure Action Plan   Name: Sylvester Davis    YOB: 1931   Date: 7/25/2018    My doctor: Manuelito Ansari     [address] [id number] [phone]  My Diagnosis: Combined Heart Failure   My Ejection Fraction: Over 50%    My Exercise Goal: 30 minutes daily  .     My Weight Goal: --  Wt Readings from Last 2 Encounters:   07/24/18 169 lb (76.7 kg)   06/25/18 171 lb (77.6 kg)     Weigh yourself daily using the same scale. If you gain more than 2 pounds in 24 hours or 5 pounds in a week increase your diuretic to Lasix (Furosemide) or --    My Diet Goal: No added salt    Emergency Room Visits:    Our goal is to improve your quality of life and help you avoid a visit to the emergency room or hospital.  If we work together, we can achieve this goal. But, if you feel you need to call 911 or go to the emergency room, please do so.  If you go to the emergency room, please bring your list of medicines and your daily weight chart with you.       GREEN ZONE     Doing well today    Weight gained is no more than 2 pounds a day or 5 pounds a week.    No swelling in feet, ankles, legs or stomach.    No more swelling than usual.    No more trouble breathing than usual.    No change in my sleep.    No other problems. Actions:    I am doing fine.  I will take my medicine, follow my diet, see my doctor, exercise, and watch for symptoms.           YELLOW ZONE         Having a bad day or flare up    Weight gain of more than 2 pounds in one day or 5 pounds in one week.    New swelling in ankle, leg, knee or thigh.    Bloating in belly, pants feel tighter.    Swelling in hands or face.    Coughing or trouble breathing while walking or talking.    Harder to breathe last night.    Have trouble sleeping, wake up short of breath.    Much more tired than usual.    Not eating.    Pain in my chest or bad leg cramps.    Feel  weak or dizzy. Actions:    I need to take action and call my doctor or nurse today.                 RED ZONE         Need medical care now    Weight gain of 5 pounds overnight.    Chest pain or pressure that does not go away.    Feel less alert.    Wheezing or have trouble breathing when at rest.    Cannot sleep lying down.    Cannot take my water pill.    Pass out or faint. Actions:    I need to call my doctor or nurse now!    Call 911 if I have chest pain or cannot breathe.

## 2018-07-24 NOTE — MR AVS SNAPSHOT
After Visit Summary   7/24/2018    Sylvester Davis    MRN: [id number]           Patient Information     Date Of Birth          12/3/1931        Visit Information        Provider Department      7/24/2018 11:00 AM Melinda Wagoner MD [facility]        Today's Diagnoses     Cerebral artery occlusion with cerebral infarction (H)    -  1    Screening for diabetic peripheral neuropathy        Need for prophylactic vaccination against Streptococcus pneumoniae (pneumococcus)        Malignant neoplasm of prostate (H)        Atrial fibrillation, chronic (H)        CKD (chronic kidney disease) stage 3, GFR 30-59 ml/min        Essential hypertension        History of prostate cancer        Alzheimer's dementia without behavioral disturbance, unspecified timing of dementia onset        Chronic atrial fibrillation (H)        Abnormal chest x-ray RML /RLL nodule vrs atelectasis         Impaired fasting glucose        Combined systolic and diastolic congestive heart failure, unspecified congestive heart failure chronicity (H)        Mixed hyperlipidemia          Care Instructions    1.  Weight Loss Tips  1. Do not eat after 6 hrs before your expected bedtime  2. Have your heaviest meal for breakfast, a slightly lighter meal at lunch and a snack 6 hrs before bed  3. No sugar/calorie drinks except milk ie no fruit juice, pop, alcohol.  4. Drink milk 30min before meals to decrease your hunger. Also it is excellent as part of your last meal of the day snack  5. Drink lots of water  6. Increase fiber in diet: all bran cereal, salads, popcorn etc  7. Have only one small serving of fruit a day about 1/2 cup (as this is high in sugar)  8. EXERCISE is the bottom line. Without it, you will gain weight even on a low calorie diet. Best if done 2-3X a day as can    Being overweight contributes to high blood pressure and high cholesterol, both of which cause heart attacks, strokes  "and kidney failure, prediabetes and diabetes, arthritis, and liver disease     3. Keep the injured area above the level of the heart as much as possible to help decrease the pain and  the healing time . Put pillows on either side while sleeping to keep the arm or leg elevated     4. Please return in 2 days     5/.  Shingrex is a 2 shot series that prevents shingles 97% of the time, as opposed to the old shingles shot that only prevented it at 40-50%  It costs less for medicare at a pharmacy  You should get it starting at 50 yrs old    get at the drug store : or can give in 2 days on return    1. shingrex   2. Tdap           Follow-ups after your visit        Follow-up notes from your care team     Return in about 2 days (around 7/26/2018).      Future tests that were ordered for you today     Open Future Orders        Priority Expected Expires Ordered    XR Chest 2 Views Routine 7/24/2018 7/24/2019 7/24/2018            Who to contact     If you have questions or need follow up information about today's clinic visit or your schedule please contact [facility] directly at [phone].  Normal or non-critical lab and imaging results will be communicated to you by [email or web address], letter or phone within 4 business days after the clinic has received the results. If you do not hear from us within 7 days, please contact the clinic through [email or web address] or phone. If you have a critical or abnormal lab result, we will notify you by phone as soon as possible.  Submit refill requests through Comviva or call your pharmacy and they will forward the refill request to us. Please allow 3 business days for your refill to be completed.          Additional Information About Your Visit        MyChart Information     Comviva lets you send messages to your doctor, view your test results, renew your prescriptions, schedule appointments and more. To sign up, go to [email or web address] . Click on \"Log in\" on the left " "side of the screen, which will take you to the Welcome page. Then click on \"Sign up Now\" on the right side of the page.     You will be asked to enter the access code listed below, as well as some personal information. Please follow the directions to create your username and password.     Your access code is: [email or web address]  Expires: 2018  2:24 PM     Your access code will  in 90 days. If you need help or a new code, please call your [place] clinic or [phone].        Care EveryWhere ID     This is your Care EveryWhere ID. This could be used by other organizations to access your [facility] medical records  [id number]        Your Vitals Were     Pulse Temperature Respirations Height Pulse Oximetry BMI (Body Mass Index)    66 97.4  F (36.3  C) (Tympanic) 18 5' 8\" (1.727 m) 97% 25.7 kg/m2       Blood Pressure from Last 3 Encounters:   18 120/80   18 140/70   18 138/68    Weight from Last 3 Encounters:   18 169 lb (76.7 kg)   18 171 lb (77.6 kg)   18 168 lb (76.2 kg)              We Performed the Following     Albumin Random Urine Quantitative with Creat Ratio     ALT     DEPRESSION ACTION PLAN (DAP)     Digoxin level     EKG 12-lead complete w/read - Clinics     FOOT EXAM  NO CHARGE [[id number]]     HEART FAILURE ACTION PLAN     HEMOGLOBIN A1C     Hemoglobin     Lipid panel reflex to direct LDL Fasting     TSH WITH FREE T4 REFLEX        Primary Care Provider Office Phone # Fax #    Manuelito Ansari [phone] [address] [facility] [address]        Equal Access to Services     LARRY GAGE AH: Hadii ayad Maldonado, jona verde, joao carreraaldavis crodova. So [facility] [phone].    ATENCIÓN: Si habla español, tiene a godinez disposición servicios gratuitos de asistencia lingüística. Llame al [phone].    We comply with applicable federal civil rights laws and [place] laws. We do not " discriminate on the basis of race, color, national origin, age, disability, sex, sexual orientation, or gender identity.            Thank you!     Thank you for choosing [facility]  for your care. Our goal is always to provide you with excellent care. Hearing back from our patients is one way we can continue to improve our services. Please take a few minutes to complete the written survey that you may receive in the mail after your visit with us. Thank you!             Your Updated Medication List - Protect others around you: Learn how to safely use, store and throw away your medicines at www.disposemymeds.org.          This list is accurate as of 7/24/18 12:40 PM.  Always use your most recent med list.                   Brand Name Dispense Instructions for use Diagnosis    aspirin 81 MG tablet      Take 81 mg by mouth daily.        DIGOX 125 MCG tablet   Generic drug:  digoxin     90 tablet    TAKE 1 TABLET BY MOUTH DAILY    Chronic systolic heart failure (H)       memantine 10 MG tablet    NAMENDA    180 tablet    TAKE 1 TABLET BY MOUTH TWICE DAILY    Dementia       metoprolol succinate 25 MG 24 hr tablet    TOPROL-XL    45 tablet    TAKE 1/2 TABLET BY MOUTH DAILY    Essential hypertension       * OXYBUTYNIN CHLORIDE PO      Take 5 mg by mouth daily        * oxybutynin 5 MG tablet    DITROPAN    180 tablet    TAKE 1 TABLET BY MOUTH TWICE DAILY    History of prostate cancer       simvastatin 20 MG tablet    ZOCOR    90 tablet    TAKE 1 TABLET BY MOUTH AT BEDTIME    Hyperlipidemia LDL goal <130       tamsulosin 0.4 MG capsule    FLOMAX    90 capsule    TAKE 1 CAPSULE BY MOUTH EVERY DAY    History of prostate cancer       torsemide 10 MG tablet    DEMADEX    90 tablet    Take 1 tablet (10 mg) by mouth daily    Chronic systolic congestive heart failure (H)       * Notice:  This list has 2 medication(s) that are the same as other medications prescribed for you. Read the directions  carefully, and ask your doctor or other care provider to review them with you.

## 2018-07-24 NOTE — PROGRESS NOTES
SUBJECTIVE:   Sylvester Davis is a 86 year old male who presents to clinic today for the following health issues:    Derm Problem      Duration: 07/22/18 of swelling of dors of Lt hand on awakening rogers hx of injury     nontender     Description (location/character/radiation): Red Spots  on Left Hand ventrum appeared on 7-24-18    Intensity:  mild    Accompanying signs and symptoms: Exposed to Hand Foot and Mouth Disease per grandkids who live with him  They had it 2 weeks ago     History (similar episodes/previous evaluation): None    Precipitating or alleviating factors: None    Therapies tried and outcome: None     Combined Heart Failure Follow-up      Symptoms:    Shortness of breath: happens with exertion only - slightly worsened    Lower extremity edema: none    Chest pain: No    Using more pillows than normal: No    Cough at night: No    Weight:    Checking weight daily: Yes    Weight change: none    Cardiology visits, ER/UC, or hospital admissions since last visit: Cardiology Visit - 04/2018    Medication side effects: none      Amount of exercise or physical activity: None    Problems taking medications regularly: No    Medication side effects: none    Diet: regular (no restrictions)    Diabetes Follow-up      Patient is checking blood sugars: not at all    Diabetic concerns: None     Symptoms of hypoglycemia (low blood sugar): none     Paresthesias (numbness or burning in feet) or sores: No     Date of last diabetic eye exam: 2-017    BP Readings from Last 2 Encounters:   07/24/18 120/80   06/25/18 140/70     Hemoglobin A1C (%)   Date Value   07/24/2018 5.4     LDL Cholesterol Calculated (mg/dL)   Date Value   07/24/2018 35   10/07/2017 47       Diabetes Management Resources    ALCOHOL ABUSE     - stopped post CVA I 2010  --part of the cause of his dementia     DEMENTIA    -from alcohol abuse hx, CVA 2010   -2 subdural hematomas s/po resex in 2010       PROSTATE CA     - s/po resex  2012  -apparently no residual       CHRONIC ATRIAL FIBRILLATION     - since ? 2010   -f/u per card   -rate control with B blocker & dig   -diuretic   AORTIC STENOSIS     OVERWEIGHT    -BMI = 25.8   -comorbid DM, HTN < CHF, AF, hi LDL       ABNORMAL CXR     -11-09 Rt base atelectasis   -1-10 RLL r/o nodule   -4-11 RML nodule   F/u with CT recommended but none done   -past hx of tobacco         Hypertension Follow-up    Outpatient blood pressures are not being checked.   Here < 140/80    Low Salt Diet: not monitoring salt    Chronic Kidney Disease:Stage 3  Follow-up      Current NSAID use?  No  -comorbid DM, HTN < CHF, AF, hi LDL   microalbuminuria    Hyperlipidemia Follow-Up      Rate your low fat/cholesterol diet?: good    Taking statin?  Yes, no muscle aches from 20mgm simvatatin    Other lipid medications/supplements?:  none        Problem list and histories reviewed & adjusted, as indicated.  Additional history: as documented    Labs reviewed in EPIC    Reviewed and updated as needed this visit by clinical staff  Tobacco  Allergies  Meds  Problems       Reviewed and updated as needed this visit by Provider         ROS:  CONSTITUTIONAL: NEGATIVE for fever, chills, change in weight  INTEGUMENTARY/SKIN: NEGATIVE for worrisome rashes, moles or lesions  EYES: NEGATIVE for vision changes or irritation  ENT/MOUTH: NEGATIVE for ear, mouth and throat problems  RESP:POSITIVE for , cough-non productive, dyspnea on exertion, wheezing and smoking  BREAST: NEGATIVE for masses, tenderness or discharge  CV: NEGATIVE for chest pain, palpitations or peripheral edema  GI: NEGATIVE for nausea, abdominal pain, heartburn, or change in bowel habits  : NEGATIVE for frequency, dysuria, or hematuria  MUSCULOSKELETAL: NEGATIVE for significant arthralgias or myalgia  NEURO: NEGATIVE for weakness, dizziness or paresthesias  ENDOCRINE: NEGATIVE for temperature intolerance, skin/hair changes  HEME: NEGATIVE for bleeding  "problems  PSYCHIATRIC: NEGATIVE for changes in mood or affect, POSITIVE for dementia  and impaired memory    OBJECTIVE:     /80  Pulse 66  Temp 97.4  F (36.3  C) (Tympanic)  Resp 18  Ht 5' 8\" (1.727 m)  Wt 169 lb (76.7 kg)  SpO2 97%  BMI 25.7 kg/m2  Body mass index is 25.7 kg/(m^2).  GENERAL: healthy, alert, no distress, over weight, frail, elderly and appears older than stated age  EYES: Eyes grossly normal to inspection, PERRL and conjunctivae and sclerae normal  RESP: lungs clear to auscultation - no rales, rhonchi or wheezes  CV: regular rate and rhythm, normal S1 S2, no S3 or S4, no murmur, click or rub, no peripheral edema and peripheral pulses strong  MS: no gross musculoskeletal defects noted, edema of dors of Lt hand   SKIN: no suspicious lesions or rashes POS scraped off 4mm diam red lesions on palm of LT hand --none on ft or mucosal membraned   Diabetic Foot Exam: No sores, ulcers, erthematous pressure areas; good DP& PT pulses and normal capillary filling time ;normal sensation to monofilament  testing   NEURO: Normal strength and tone, mentation intact and speech normal  PSYCH: concentration poor, confused, disoriented, inattentive, affect normal/bright, affect flat and appearance well groomed    Diagnostic Test Results:  Results for orders placed or performed in visit on 07/24/18   Digoxin level   Result Value Ref Range    Digoxin Level 1.4 0.5 - 2.0 ug/L   HEMOGLOBIN A1C   Result Value Ref Range    Hemoglobin A1C 5.4 0 - 5.6 %   Albumin Random Urine Quantitative with Creat Ratio   Result Value Ref Range    Creatinine Urine 118 mg/dL    Albumin Urine mg/L 109 mg/L    Albumin Urine mg/g Cr 92.37 (H) 0 - 17 mg/g Cr   TSH WITH FREE T4 REFLEX   Result Value Ref Range    TSH 2.09 0.40 - 4.00 mU/L   ALT   Result Value Ref Range    ALT 14 0 - 70 U/L   Lipid panel reflex to direct LDL Fasting   Result Value Ref Range    Cholesterol 104 <200 mg/dL    Triglycerides 169 (H) <150 mg/dL    HDL " Cholesterol 35 (L) >39 mg/dL    LDL Cholesterol Calculated 35 <100 mg/dL    Non HDL Cholesterol 69 <130 mg/dL   Hemoglobin   Result Value Ref Range    Hemoglobin 11.8 (L) 13.3 - 17.7 g/dL       ASSESSMENT/PLAN:               ICD-10-CM    1. Edema of LT  hand - since 7-22-18 R60.0    2. Type 2 diabetes mellitus with diabetic nephropathy, without long-term current use of insulin (H) E11.21    3. Malignant neoplasm of prostate (H) C61    4. Alcohol abuse-resolved post CVA in 2010 F10.10    5. Atrial fibrillation, chronic (H) I48.2 EKG 12-lead complete w/read - Clinics     CANCELED: EKG 12-lead complete w/read - Clinics   6. Skin lesion ventral Lt hand since 7-24-18 w hx exposure to Hand, Foot and Mouth disease L98.9    7. Abnormal chest x-ray RML /RLL nodule vrs atelectasis  R93.8 XR Chest 2 Views   8. Cerebral artery occlusion with cerebral infarction (H) I63.50 HEART FAILURE ACTION PLAN   9. CKD (chronic kidney disease) stage 3, GFR 30-59 ml/min N18.3 Hemoglobin   10. Essential hypertension I10 Hemoglobin     CANCELED: EKG 12-lead complete w/read - Clinics   11. History of prostate cancer Z85.46    12. Alzheimer's dementia without behavioral disturbance, unspecified timing of dementia onset G30.9     F02.80    13. Impaired fasting glucose R73.01 HEMOGLOBIN A1C     TSH WITH FREE T4 REFLEX   14. Combined systolic and diastolic congestive heart failure, unspecified congestive heart failure chronicity (H) I50.40 Digoxin level     Albumin Random Urine Quantitative with Creat Ratio     Hemoglobin     EKG 12-lead complete w/read - Clinics     CANCELED: EKG 12-lead complete w/read - Clinics   15. Mixed hyperlipidemia E78.2 ALT     Lipid panel reflex to direct LDL Fasting   16. Aortic valve stenosis, etiology of cardiac valve disease unspecified I35.0    17. Overweight (BMI 25.0-29.9) E66.3    18. Screening for diabetic peripheral neuropathy Z13.89 FOOT EXAM  NO CHARGE [40049.114]   19. Need for prophylactic vaccination  against Streptococcus pneumoniae (pneumococcus) Z23        Patient Instructions   1.  Weight Loss Tips  1. Do not eat after 6 hrs before your expected bedtime  2. Have your heaviest meal for breakfast, a slightly lighter meal at lunch and a snack 6 hrs before bed  3. No sugar/calorie drinks except milk ie no fruit juice, pop, alcohol.  4. Drink milk 30min before meals to decrease your hunger. Also it is excellent as part of your last meal of the day snack  5. Drink lots of water  6. Increase fiber in diet: all bran cereal, salads, popcorn etc  7. Have only one small serving of fruit a day about 1/2 cup (as this is high in sugar)  8. EXERCISE is the bottom line. Without it, you will gain weight even on a low calorie diet. Best if done 2-3X a day as can    Being overweight contributes to high blood pressure and high cholesterol, both of which cause heart attacks, strokes and kidney failure, prediabetes and diabetes, arthritis, and liver disease     3. Keep the injured area above the level of the heart as much as possible to help decrease the pain and  the healing time . Put pillows on either side while sleeping to keep the arm or leg elevated     4. Please return in 2 days     5/.  Shingrex is a 2 shot series that prevents shingles 97% of the time, as opposed to the old shingles shot that only prevented it at 40-50%  It costs less for medicare at a pharmacy  You should get it starting at 50 yrs old    get at the drug store : or can give in 2 days on return    1. shingrex   2. Tdap     Discussed all with pt  And the patient expresses understanding that is not likely Hand foot and Mouth as usually have been exposed to it by this age  If it is, is viral so no RX     Needs  PCV 13  Tdap     Time spent with the patient 48mins, more than 50% in counseling and coordinating care, Re above medical problems.  See the above problems, work up etc ordered and counselling done     His DEMENTIA   Is likely a combination of former  alcohol abuse , CVA  abnd 2 subdural hematomas resected 0-in 2010     DIABETES     - listed in chart but highest A1C -= 5.6   Son doesn't thinks he has it   Await A1C today         Melinda Wagoner MD  [facility]      Weight management plan: Discussed healthy diet and exercise guidelines and patient will follow up in 3 months in clinic to re-evaluate.    Melinda Wagoner MD

## 2018-07-24 NOTE — LETTER
August 2, 2018      Sylvester Davis  [address] [place] [address]        Dear ,    We are writing to inform you of your test results.    All of your lab results are normal, except as noted below.     The following are explanations of some of our lab tests     THIS DOES NOT MEAN THAT YOU HAD ALL OF THESE DONE     Please continue on the same medications unless a change is noted above     These are some general explanations for tests:     Hgb is the blood iron level###continues a little low, as it has in the past   ### Please eat iron in diet : beets, molasses ,  red meat and greens eg spinach     WBC means White Blood Cells   Platelets are small blood cells that help with forming the blood clots along with other blood factors.   Electrolytes are Sodium, Potassium, Calcium, Magnesium, Phosphorus.   Liver tests are: AST, ALT, Bilirubin, Alkaline Phosphatase.   Kidney tests are Creatinine, GFR.   HDL Cholesterol - is the good cholesterol and it is good to have it high.   LDL cholesterol is the bad cholesterol and it is good to have it low.   It is recommended to have LDL less than 130 for people with hypertension and to have it less than 100 for people with heart disease, diabetes and chronic kidney disease.   Triglycerides are another type of lipid and can also cause heart disease so should be kept low. ###they are only slightly high ###we can rechek in the future     Thyroid tests are TSH, T4, T3   Glucose is sugar   A1c is a test that gives us an idea about how well was controlled the diabetes for the last 3 months.   PSA stands for Prostate Specific Antigen and it can be elevated with prostate cancer or prostate inflammation.     Normal chest x ray !!     Resulted Orders   Digoxin level   Result Value Ref Range    Digoxin Level 1.4 0.5 - 2.0 ug/L   HEMOGLOBIN A1C   Result Value Ref Range    Hemoglobin A1C 5.4 0 - 5.6 %      Comment:      Normal <5.7% Prediabetes 5.7-6.4%   Diabetes 6.5% or higher - adopted from ADA   consensus guidelines.     Albumin Random Urine Quantitative with Creat Ratio   Result Value Ref Range    Creatinine Urine 118 mg/dL    Albumin Urine mg/L 109 mg/L    Albumin Urine mg/g Cr 92.37 (H) 0 - 17 mg/g Cr   TSH WITH FREE T4 REFLEX   Result Value Ref Range    TSH 2.09 0.40 - 4.00 mU/L   ALT   Result Value Ref Range    ALT 14 0 - 70 U/L   Lipid panel reflex to direct LDL Fasting   Result Value Ref Range    Cholesterol 104 <200 mg/dL    Triglycerides 169 (H) <150 mg/dL      Comment:      Borderline high:  150-199 mg/dl  High:             200-499 mg/dl  Very high:       >499 mg/dl  Non Fasting      HDL Cholesterol 35 (L) >39 mg/dL    LDL Cholesterol Calculated 35 <100 mg/dL      Comment:      Desirable:       <100 mg/dl    Non HDL Cholesterol 69 <130 mg/dL   Hemoglobin   Result Value Ref Range    Hemoglobin 11.8 (L) 13.3 - 17.7 g/dL       If you have any questions or concerns, please call the clinic at the number listed above.       Sincerely,        Melinda Wagoner MD

## 2018-07-25 PROBLEM — E66.3 OVERWEIGHT (BMI 25.0-29.9): Status: ACTIVE | Noted: 2018-07-25

## 2018-07-25 PROBLEM — F10.10 ALCOHOL ABUSE: Status: ACTIVE | Noted: 2018-07-25

## 2018-07-25 PROBLEM — I35.0 AORTIC VALVE STENOSIS, ETIOLOGY OF CARDIAC VALVE DISEASE UNSPECIFIED: Status: ACTIVE | Noted: 2018-07-25

## 2018-07-25 PROBLEM — E11.21 TYPE 2 DIABETES MELLITUS WITH DIABETIC NEPHROPATHY, WITHOUT LONG-TERM CURRENT USE OF INSULIN (H): Status: ACTIVE | Noted: 2018-07-25

## 2018-07-25 LAB
ALT SERPL W P-5'-P-CCNC: 14 U/L (ref 0–70)
CHOLEST SERPL-MCNC: 104 MG/DL
CREAT UR-MCNC: 118 MG/DL
HDLC SERPL-MCNC: 35 MG/DL
LDLC SERPL CALC-MCNC: 35 MG/DL
MICROALBUMIN UR-MCNC: 109 MG/L
MICROALBUMIN/CREAT UR: 92.37 MG/G CR (ref 0–17)
NONHDLC SERPL-MCNC: 69 MG/DL
TRIGL SERPL-MCNC: 169 MG/DL
TSH SERPL DL<=0.005 MIU/L-ACNC: 2.09 MU/L (ref 0.4–4)

## 2018-07-25 ASSESSMENT — PATIENT HEALTH QUESTIONNAIRE - PHQ9: SUM OF ALL RESPONSES TO PHQ QUESTIONS 1-9: 3

## 2018-07-25 ASSESSMENT — ANXIETY QUESTIONNAIRES: GAD7 TOTAL SCORE: 0

## 2018-08-02 DIAGNOSIS — Z85.46 HISTORY OF PROSTATE CANCER: ICD-10-CM

## 2018-08-02 NOTE — TELEPHONE ENCOUNTER
"Requested Prescriptions   Pending Prescriptions Disp Refills     oxybutynin (DITROPAN) 5 MG tablet [Pharmacy Med Name: OXYBUTYNIN 5MG TABLETS]  Last Written Prescription Date:  5/2/2018  Last Fill Quantity: 180,  # refills: 0   Last office visit: 7/24/2018 with prescribing provider:  Dr BRENTON Wagoner   Future Office Visit:     180 tablet 0     Sig: TAKE 1 TABLET BY MOUTH TWICE DAILY    Muscarinic Antagonists (Urinary Incontinence Agents) Failed    8/2/2018  3:58 AM       Failed - Patient does not have a diagnosis of glaucoma on the problem list    If glaucoma diagnosis is new, refer refill to physician.         Passed - Recent (12 mo) or future (30 days) visit within the authorizing provider's specialty    Patient had office visit in the last 12 months or has a visit in the next 30 days with authorizing provider or within the authorizing provider's specialty.  See \"Patient Info\" tab in inbasket, or \"Choose Columns\" in Meds & Orders section of the refill encounter.           Passed - Medication is Oxybutynin and patient is 5 years of age or older       Passed - Patient is 18 years of age or older          "

## 2018-08-02 NOTE — PROGRESS NOTES
Please see attached lab results  All of your lab results are normal, except as noted below.    The following are explanations of some of our lab tests    THIS DOES NOT MEAN THAT YOU HAD ALL OF THESE DONE    Please continue on the same medications unless a change is noted above    These are some general explanations for tests:    Hgb is the blood iron level###continues a little low, as it has in the past   ### Please eat iron in diet : beets, molasses ,  red meat and greens eg spinach     WBC means White Blood Cells  Platelets are small blood cells that help with forming the blood clots along with other blood factors.  Electrolytes are Sodium, Potassium, Calcium, Magnesium, Phosphorus.  Liver tests are: AST, ALT, Bilirubin, Alkaline Phosphatase.  Kidney tests are Creatinine, GFR.  HDL Cholesterol - is the good cholesterol and it is good to have it high.  LDL cholesterol is the bad cholesterol and it is good to have it low.  It is recommended to have LDL less than 130 for people with hypertension and to have it less than 100 for people with heart disease, diabetes and chronic kidney disease.  Triglycerides are another type of lipid and can also cause heart disease so should be kept low. ###they are only slightly high ###we can rechek in the future     Thyroid tests are TSH, T4, T3  Glucose is sugar  A1c is a test that gives us an idea about how well was controlled the diabetes for the last 3 months.   PSA stands for Prostate Specific Antigen and it can be elevated with prostate cancer or prostate inflammation.    Normal chest x ray !!

## 2018-08-03 NOTE — TELEPHONE ENCOUNTER
Routing refill request to provider for review/approval because:  Fails protocol, glaucoma on problem list

## 2018-08-06 RX ORDER — OXYBUTYNIN CHLORIDE 5 MG/1
TABLET ORAL
Qty: 180 TABLET | Refills: 0 | Status: SHIPPED | OUTPATIENT
Start: 2018-08-06 | End: 2018-11-02

## 2018-08-27 DIAGNOSIS — I50.22 CHRONIC SYSTOLIC CONGESTIVE HEART FAILURE (H): ICD-10-CM

## 2018-08-27 RX ORDER — TORSEMIDE 10 MG/1
10 TABLET ORAL DAILY
Qty: 90 TABLET | Refills: 1 | Status: SHIPPED | OUTPATIENT
Start: 2018-08-27 | End: 2019-01-01

## 2018-08-27 NOTE — TELEPHONE ENCOUNTER
Received refill request for:  Torsemide  Last OV was: 6/25/2018 with JASMYNE Orozco  Labs/EKG: last BMP 6/25/2018  F/U scheduled: orders in Epic for 12/2018  New script sent to: [facility]

## 2018-11-02 ENCOUNTER — ALLIED HEALTH/NURSE VISIT (OUTPATIENT)
Dept: PHARMACY | Facility: CLINIC | Age: 83
End: 2018-11-02
Payer: COMMERCIAL

## 2018-11-02 DIAGNOSIS — F03.90 DEMENTIA WITHOUT BEHAVIORAL DISTURBANCE, UNSPECIFIED DEMENTIA TYPE: ICD-10-CM

## 2018-11-02 DIAGNOSIS — Z85.46 HISTORY OF PROSTATE CANCER: ICD-10-CM

## 2018-11-02 DIAGNOSIS — I50.22 CHRONIC SYSTOLIC HEART FAILURE (H): Primary | ICD-10-CM

## 2018-11-02 DIAGNOSIS — I63.50 CEREBRAL ARTERY OCCLUSION WITH CEREBRAL INFARCTION (H): ICD-10-CM

## 2018-11-02 DIAGNOSIS — R32 URINARY INCONTINENCE, UNSPECIFIED TYPE: ICD-10-CM

## 2018-11-02 DIAGNOSIS — E78.5 HYPERLIPIDEMIA LDL GOAL <130: ICD-10-CM

## 2018-11-02 DIAGNOSIS — I48.20 ATRIAL FIBRILLATION, CHRONIC (H): ICD-10-CM

## 2018-11-02 PROCEDURE — 99607 MTMS BY PHARM ADDL 15 MIN: CPT | Mod: U4 | Performed by: PHARMACIST

## 2018-11-02 PROCEDURE — 99605 MTMS BY PHARM NP 15 MIN: CPT | Mod: U4 | Performed by: PHARMACIST

## 2018-11-02 NOTE — MR AVS SNAPSHOT
After Visit Summary   11/2/2018    Sylvester Davis    MRN: [id number]           Patient Information     Date Of Birth          12/3/1931        Visit Information        Provider Department      11/2/2018 12:30 PM Shawn Birch [facility] [place]        Today's Diagnoses     Chronic systolic heart failure (H)    -  1    Atrial fibrillation, chronic (H)        Cerebral artery occlusion with cerebral infarction (H)        Hyperlipidemia LDL goal <130        Dementia without behavioral disturbance, unspecified dementia type        History of prostate cancer        Urinary incontinence, unspecified type          Care Instructions    Sent via Medication Action Plan letter          Follow-ups after your visit        Follow-up notes from your care team     Return in about 1 year (around 11/2/2019) for [facility] Pharmacist Phone Visit.      Your next 10 appointments already scheduled     Dec 17, 2018  1:00 PM CST   LAB with [facility]   [facility] [place] ([facility])    [address] [place] [id number] [phone]           Please do not eat 10-12 hours before your appointment if you are coming in fasting for labs on lipids, cholesterol, or glucose (sugar). This does not apply to pregnant women. Water, hot tea and black coffee (with nothing added) are okay. Do not drink other fluids, diet soda or chew gum.            Dec 17, 2018  1:45 PM CST   Return Visit with August Mcclain MD   [facility] ([facility])    [address] [place] [id number] [phone] OPT 2              Who to contact     If you have questions or need follow up information about today's clinic visit or your schedule please contact [facility] directly at [phone].  Normal or non-critical lab and imaging results will be communicated to you by MyChart, letter or  "phone within 4 business days after the clinic has received the results. If you do not hear from us within 7 days, please contact the clinic through [employer] or phone. If you have a critical or abnormal lab result, we will notify you by phone as soon as possible.  Submit refill requests through [employer] or call your pharmacy and they will forward the refill request to us. Please allow 3 business days for your refill to be completed.          Additional Information About Your Visit        Ajungo Information     [employer]o lets you send messages to your doctor, view your test results, renew your prescriptions, schedule appointments and more. To sign up, go to [email or web address] . Click on \"Log in\" on the left side of the screen, which will take you to the Welcome page. Then click on \"Sign up Now\" on the right side of the page.     You will be asked to enter the access code listed below, as well as some personal information. Please follow the directions to create your username and password.     Your access code is: [email or web address]  Expires: 2019  3:50 PM     Your access code will  in 90 days. If you need help or a new code, please call your [place] clinic or [phone].        Care EveryWhere ID     This is your Care EveryWhere ID. This could be used by other organizations to access your [place] medical records  [id number]         Blood Pressure from Last 3 Encounters:   18 120/80   18 140/70   18 138/68    Weight from Last 3 Encounters:   18 169 lb (76.7 kg)   18 171 lb (77.6 kg)   18 168 lb (76.2 kg)              Today, you had the following     No orders found for display       Primary Care Provider Office Phone # Fax #    Manuelito Ansari [phone] [address]  [facility] [address]        Equal Access to Services     LARRY GAGE AH: Hadii aad ku hadasho Soomaali, waaxda luqadaha, qaybta kaalmada genevieve, davis mckinnon " deo [place] ah. So [facility] [phone].    ATENCIÓN: Si bev matos, tiene a godinez disposición servicios gratuitos de asistencia lingüística. Adriane moreno [phone].    We comply with applicable federal civil rights laws and [place] laws. We do not discriminate on the basis of race, color, national origin, age, disability, sex, sexual orientation, or gender identity.            Thank you!     Thank you for choosing [facility]  for your care. Our goal is always to provide you with excellent care. Hearing back from our patients is one way we can continue to improve our services. Please take a few minutes to complete the written survey that you may receive in the mail after your visit with us. Thank you!             Your Updated Medication List - Protect others around you: Learn how to safely use, store and throw away your medicines at www.disposemymeds.org.          This list is accurate as of 11/2/18  3:50 PM.  Always use your most recent med list.                   Brand Name Dispense Instructions for use Diagnosis    aspirin 81 MG tablet      Take 81 mg by mouth daily.        DIGOX 125 MCG tablet   Generic drug:  digoxin     90 tablet    TAKE 1 TABLET BY MOUTH DAILY    Chronic systolic heart failure (H)       memantine 10 MG tablet    NAMENDA    180 tablet    TAKE 1 TABLET BY MOUTH TWICE DAILY    Dementia       metoprolol succinate 25 MG 24 hr tablet    TOPROL-XL    45 tablet    TAKE 1/2 TABLET BY MOUTH DAILY    Essential hypertension       OXYBUTYNIN CHLORIDE PO      Take 5 mg by mouth every other day        simvastatin 20 MG tablet    ZOCOR    90 tablet    TAKE 1 TABLET BY MOUTH AT BEDTIME    Hyperlipidemia LDL goal <130       tamsulosin 0.4 MG capsule    FLOMAX    90 capsule    TAKE 1 CAPSULE BY MOUTH EVERY DAY    History of prostate cancer       torsemide 10 MG tablet    DEMADEX    90 tablet    Take 1 tablet (10 mg) by mouth daily    Chronic systolic congestive heart failure (H)

## 2018-11-02 NOTE — PROGRESS NOTES
SUBJECTIVE/OBJECTIVE:                Sylvester Davis is a 86 year old male called for a follow-up visit for Medication Therapy Management.  He was referred to me from her [employer] insurance. Visit today was conducted with his son, Bj, who takes care of his father. This serves as an initial visit for 2018.    Chief Complaint: Follow up from our visit on 7/14/17.  Comprehensive medication review - desire to reduce medications  Tobacco: History of tobacco dependence - quit 50 years ago   Alcohol: none    Medication Adherence/Access:  no issues reported    AFib/HFrEF/Stroke: Current medications include aspirin 81 mg daily, digoxin 125 mcg daily, metoprolol XL 12.5 mg daily, and torsemide 10 mg daily.  Patient reports no current medication side effects.     ECHO:  Date 10/7/16, EF 55-60%  Pt is not complaining of sx of HF.  Weighing about three times a week, but stable.  Patient does not self-monitor BP.   Pt is following a low sodium diet, is avoiding EtOH.    Hyperlipidemia: Current therapy includes simvastatin 20 mg once daily.  Pt reports no significant myalgias or other side effects.     Dementia: Pt is taking Namenda 10 mg twice daily.  Been on for a few years. Son feels that it has been effective in slowing the progression of his disease down, but his memory has recently taken another turn.  His father did have vision surgery and now can read the paper again, which he has found to be a positive.  Memory issues are both short and long term.  Son helps out with ADLs. Denies any behavior issues. Denies any side effects.    Hx of Prostate Cancer/Urinary Incontinence: Pt is taking tamsulosin 0.4 mg daily and oxybuytnin IR 5 mg every other day in the morning. Started anticholinergic due to loop diuretic. Discussed last year of trial off medication but son never went completely off.  Pt states his father's memory issues are still not good and he is tired during the day, but does not know why. Father may  get up to urinate overnight, but son works overnights so cannot be sure.  Notes that his pads are not damp.      Today's Vitals: There were no vitals taken for this visit. - telephone encounter, no vitals       ASSESSMENT:              Current medications were reviewed today as discussed above.  Medicare Part D topics discussed:Medication changes    Medication Adherence: good, no issues identified    AFib/HFrEF/Stroke: Stable. Pt is meeting BP goal of <140/90 mmHg. Last digoxin level was WNL and pulses have been stable. Not on warfarin due to history of fall with intracerebral bleed/declined Watchman's due to current health status.     Hyperlipidemia: Stable. Pt is not on high intensity statin which is indicated based on 2013 ACC/AHA guidelines for lipid management but with current age and co-morbidities and LDL <70, recommend no change.     Dementia: Stable.  Per patient/son preference prefer to continue at this time.     Hx of Prostate Cancer/Urinary incontinence: needs improvement.  Patient is currently wearing pads and uncertain if oxybutynin has benefit (added when torsemide added) and may add to memory impairment.  Did not trial off as previously directed - may benefit from continuing previous plan.    PLAN:                  1. Pt to consider trial off of oxybutynin.     I spent 20 minutes with this patient today (an extra 15 minutes was spent creating the Medication Action Plan). All changes were made via collaborative practice agreement with Manuelito Ansari. A copy of the visit note was provided to the patient's primary care provider.     Will follow up in 1 month via phone.    The patient was mailed a summary of these recommendations as an after visit summary.        Maikel Birch, PharmD, BCACP  Medication Therapy Management Pharmacist  Pager: [phone]

## 2018-11-02 NOTE — LETTER
"     [facility] MTM     Date: 2018    Sylvester Davis  [address] [address]    Dear Mr. Davis,    Thank you for talking with me on 18 about your health and medications. Medicare s MTM (Medication Therapy Management) program helps you understand your medications and use them safely.      This letter includes an action plan (Medication Action Plan) and medication list (Personal Medication List). The action plan has steps you should take to help you get the best results from your medications. The medication list will help you keep track of your medications and how to use them the right way.       Have your action plan and medication list with you when you talk with your doctors, pharmacists, and other healthcare providers in your care team.     Ask your doctors, pharmacists, and other healthcare providers to update the action plan and medication list at every visit.     Take your medication list with you if you go to the hospital or emergency room.     Give a copy of the action plan and medication list to your family or caregivers.     If you want to talk about this letter or any of the papers with it, please call   [phone].   We look forward to working with you, your doctors, and other healthcare providers to help you stay healthy.     Sincerely,    Shawn Birch      Enclosed: Medication Action Plan and Personal Medication List    MEDICATION ACTION PLAN FOR Sylvester Davis,  12/3/1931     This action plan will help you get the best results from your medications if you:   1. Read \"What we talked about.\"   2. Take the steps listed in the \"What I need to do\" boxes.   3. Fill in \"What I did and when I did it.\"   4. Fill in \"My follow-up plan\" and \"Questions I want to ask.\"     Have this action plan with you when you talk with your doctors, pharmacists, and other healthcare providers in your care team. Share this with your family or " caregivers too.  DATE PREPARED: 2018  What we talked about: Sylvester has not shown much difference on days he takes oxybutynin compared to days he skips.                                                  What I need to do: Sylvester may benefit from stopping oxybutynin to determine if he needs to continue taking. If symptoms worsen he could restart or talk about alternatives that may have less potential effect on memory.   What I did and when I did it:                                              My follow-up plan:                 Questions I want to ask:              If you have any questions about your action plan, call Shawn Terrell Birch, Phone: [phone] , Monday-Friday 8-4:30pm.           MEDICATION LIST FOR Sylvester Davis,  12/3/1931     This medication list was made for you after we talked. We also used information from your doctor's chart.      Use blank rows to add new medications. Then fill in the dates you started using them.    Cross out medications when you no longer use them. Then write the date and why you stopped using them.    Ask your doctors, pharmacists, and other healthcare providers to update this list at every visit. Keep this list up-to-date with:       Prescription medications    Over the counter drugs     Herbals    Vitamins    Minerals      If you go to the hospital or emergency room, take this list with you. Share this with your family or caregivers too.     DATE PREPARED: 2018  Allergies or side effects: Fish allergy and Coumadin     Medication:  ASPIRIN 81 MG PO TABS      How I use it:  Take 81 mg by mouth daily.      Why I use it:  Stroke prevention    Prescriber:  Patient Reported      Date I started using it:       Date I stopped using it:         Why I stopped using it:            Medication:  DIGOX 125 MCG PO TABS      How I use it:  TAKE 1 TABLET BY MOUTH DAILY      Why I use it: Atrial fibrillation/Heart failure    Prescriber:  Manuelito Ansari MD       Date I started using it:       Date I stopped using it:         Why I stopped using it:            Medication:  MEMANTINE HCL 10 MG PO TABS      How I use it:  TAKE 1 TABLET BY MOUTH TWICE DAILY      Why I use it: Dementia    Prescriber:  Manuelito Ansari MD      Date I started using it:       Date I stopped using it:         Why I stopped using it:            Medication:  METOPROLOL SUCCINATE ER 25 MG PO TB24      How I use it:  TAKE 1/2 TABLET BY MOUTH DAILY      Why I use it: Blood Pressure, Atrial fibrillation, heart failure    Prescriber:  Manuelito Ansari MD      Date I started using it:       Date I stopped using it:         Why I stopped using it:            Medication:  OXYBUTYNIN CHLORIDE PO      How I use it:  Take 5 mg by mouth every other day       Why I use it:  Incontinence    Prescriber:  Patient Reported      Date I started using it:       Date I stopped using it:         Why I stopped using it:            Medication:  SIMVASTATIN 20 MG PO TABS      How I use it:  TAKE 1 TABLET BY MOUTH AT BEDTIME      Why I use it: Cholesterol; Stroke prevention    Prescriber:  Manuelito Ansari MD      Date I started using it:       Date I stopped using it:         Why I stopped using it:            Medication:  TAMSULOSIN HCL 0.4 MG PO CAPS      How I use it:  TAKE 1 CAPSULE BY MOUTH EVERY DAY      Why I use it: Prostate    Prescriber:  Manuelito Ansari MD      Date I started using it:       Date I stopped using it:         Why I stopped using it:            Medication:  TORSEMIDE 10 MG PO TABS      How I use it:  Take 1 tablet (10 mg) by mouth daily      Why I use it: Heart failure    Prescriber:  August Mcclain MD      Date I started using it:       Date I stopped using it:         Why I stopped using it:            Medication:         How I use it:         Why I use it:      Prescriber:         Date I started using it:       Date I stopped using it:         Why I stopped using it:             Medication:         How I use it:         Why I use it:      Prescriber:         Date I started using it:       Date I stopped using it:         Why I stopped using it:            Medication:         How I use it:         Why I use it:      Prescriber:         Date I started using it:       Date I stopped using it:         Why I stopped using it:              Other Information:     If you have any questions about your action plan, call [phone].    According to the Paperwork Reduction Act of 1995, no persons are required to respond to a collection of information unless it displays a valid OMB control number. The valid OMB number for this information collection is [id number]. The time required to complete this information collection is estimated to average 40 minutes per response, including the time to review instructions, searching existing data resources, gather the data needed, and complete and review the information collection. If you have any comments concerning the accuracy of the time estimate(s) or suggestions for improving this form, please write to: CMS, Attn: [employer] Reports Clearance Officer, [address] [place] [id number].

## 2018-11-06 ENCOUNTER — ALLIED HEALTH/NURSE VISIT (OUTPATIENT)
Dept: NURSING | Facility: CLINIC | Age: 83
End: 2018-11-06
Payer: COMMERCIAL

## 2018-11-06 DIAGNOSIS — Z23 NEED FOR PROPHYLACTIC VACCINATION AND INOCULATION AGAINST INFLUENZA: Primary | ICD-10-CM

## 2018-11-06 PROCEDURE — G0008 ADMIN INFLUENZA VIRUS VAC: HCPCS

## 2018-11-06 PROCEDURE — 90662 IIV NO PRSV INCREASED AG IM: CPT

## 2018-11-06 NOTE — PROGRESS NOTES

## 2018-11-06 NOTE — MR AVS SNAPSHOT
After Visit Summary   11/6/2018    Sylvester Davis    MRN: [id number]           Patient Information     Date Of Birth          12/3/1931        Visit Information        Provider Department      11/6/2018 2:30 PM BX NURSE [facility]        Today's Diagnoses     Need for prophylactic vaccination and inoculation against influenza    -  1       Follow-ups after your visit        Your next 10 appointments already scheduled     Nov 06, 2018  2:30 PM CST   Nurse Only with BX NURSE   [facility] ([facility]    [address]  [facility] [address]   [phone]            Dec 17, 2018  1:00 PM CST   LAB with [facility]   [facility] ([facility])    [address] [place], [place] [id number]  [facility] [address] [phone]           Please do not eat 10-12 hours before your appointment if you are coming in fasting for labs on lipids, cholesterol, or glucose (sugar). This does not apply to pregnant women. Water, hot tea and black coffee (with nothing added) are okay. Do not drink other fluids, diet soda or chew gum.            Dec 17, 2018  1:45 PM CST   Return Visit with August Mcclain MD   [facility] ([facility])    [address] [place], [place] [address]  [facility] [address] [phone] OPT 2              Who to contact     If you have questions or need follow up information about today's clinic visit or your schedule please contact [facility] directly at [phone].  Normal or non-critical lab and imaging results will be communicated to you by MyChart, letter or phone within 4 business days after the clinic has received the results. If you do not hear from us within 7 days, please contact the clinic through MyChart or phone. If you have a critical or abnormal lab  "result, we will notify you by phone as soon as possible.  Submit refill requests through [employer] or call your pharmacy and they will forward the refill request to us. Please allow 3 business days for your refill to be completed.          Additional Information About Your Visit        [email or web address] Information     [employer] lets you send messages to your doctor, view your test results, renew your prescriptions, schedule appointments and more. To sign up, go to [email or web address] . Click on \"Log in\" on the left side of the screen, which will take you to the Welcome page. Then click on \"Sign up Now\" on the right side of the page.     You will be asked to enter the access code listed below, as well as some personal information. Please follow the directions to create your username and password.     Your access code is: [email or web address]  Expires: 2019  2:50 PM     Your access code will  in 90 days. If you need help or a new code, please call your [facility] clinic or [phone].        Care EveryWhere ID     This is your Care EveryWhere ID. This could be used by other organizations to access your [facility] medical records  [id number]         Blood Pressure from Last 3 Encounters:   18 120/80   18 140/70   18 138/68    Weight from Last 3 Encounters:   18 169 lb (76.7 kg)   18 171 lb (77.6 kg)   18 168 lb (76.2 kg)              We Performed the Following     ADMIN INFLUENZA (For MEDICARE Patients ONLY) []     FLU VACCINE, INCREASED ANTIGEN, PRESV FREE, AGE 65+ [29224]     Vaccine Administration, Initial [61358]        Primary Care Provider Office Phone # Fax #    Manuelito Ansari [phone] [address]  [facility] [address]        Equal Access to Services     CASSANDRA GAGE : Pearl Maldonado, wajayant verde, qaybta debialanaid vallejo, davis [place] . [facility] [phone].    ATENCIÓN: Si bev matos, " tiene a godinez disposición servicios gratuitos de asistencia lingüística. Adriane moreno [phone].    We comply with applicable federal civil rights laws and [place] laws. We do not discriminate on the basis of race, color, national origin, age, disability, sex, sexual orientation, or gender identity.            Thank you!     Thank you for choosing [facility]  for your care. Our goal is always to provide you with excellent care. Hearing back from our patients is one way we can continue to improve our services. Please take a few minutes to complete the written survey that you may receive in the mail after your visit with us. Thank you!             Your Updated Medication List - Protect others around you: Learn how to safely use, store and throw away your medicines at www.disposemymeds.org.          This list is accurate as of 11/6/18  1:59 PM.  Always use your most recent med list.                   Brand Name Dispense Instructions for use Diagnosis    aspirin 81 MG tablet      Take 81 mg by mouth daily.        DIGOX 125 MCG tablet   Generic drug:  digoxin     90 tablet    TAKE 1 TABLET BY MOUTH DAILY    Chronic systolic heart failure (H)       memantine 10 MG tablet    NAMENDA    180 tablet    TAKE 1 TABLET BY MOUTH TWICE DAILY    Dementia       metoprolol succinate 25 MG 24 hr tablet    TOPROL-XL    45 tablet    TAKE 1/2 TABLET BY MOUTH DAILY    Essential hypertension       OXYBUTYNIN CHLORIDE PO      Take 5 mg by mouth every other day        simvastatin 20 MG tablet    ZOCOR    90 tablet    TAKE 1 TABLET BY MOUTH AT BEDTIME    Hyperlipidemia LDL goal <130       tamsulosin 0.4 MG capsule    FLOMAX    90 capsule    TAKE 1 CAPSULE BY MOUTH EVERY DAY    History of prostate cancer       torsemide 10 MG tablet    DEMADEX    90 tablet    Take 1 tablet (10 mg) by mouth daily    Chronic systolic congestive heart failure (H)

## 2018-12-17 ENCOUNTER — OFFICE VISIT (OUTPATIENT)
Dept: CARDIOLOGY | Facility: CLINIC | Age: 83
End: 2018-12-17
Attending: NURSE PRACTITIONER
Payer: COMMERCIAL

## 2018-12-17 VITALS
DIASTOLIC BLOOD PRESSURE: 86 MMHG | HEIGHT: 68 IN | WEIGHT: 175 LBS | BODY MASS INDEX: 26.52 KG/M2 | HEART RATE: 77 BPM | SYSTOLIC BLOOD PRESSURE: 167 MMHG

## 2018-12-17 DIAGNOSIS — I50.42 CHRONIC COMBINED SYSTOLIC AND DIASTOLIC HEART FAILURE (H): ICD-10-CM

## 2018-12-17 LAB
ANION GAP SERPL CALCULATED.3IONS-SCNC: 9.9 MMOL/L (ref 6–17)
BUN SERPL-MCNC: 10 MG/DL (ref 7–30)
CALCIUM SERPL-MCNC: 9.4 MG/DL (ref 8.5–10.5)
CHLORIDE SERPL-SCNC: 101 MMOL/L (ref 98–107)
CO2 SERPL-SCNC: 30 MMOL/L (ref 23–29)
CREAT SERPL-MCNC: 1.29 MG/DL (ref 0.7–1.3)
GFR SERPL CREATININE-BSD FRML MDRD: 53 ML/MIN/1.7M2
GLUCOSE SERPL-MCNC: 112 MG/DL (ref 70–105)
POTASSIUM SERPL-SCNC: 3.9 MMOL/L (ref 3.5–5.1)
SODIUM SERPL-SCNC: 137 MMOL/L (ref 136–145)

## 2018-12-17 PROCEDURE — 99214 OFFICE O/P EST MOD 30 MIN: CPT | Performed by: INTERNAL MEDICINE

## 2018-12-17 PROCEDURE — 36415 COLL VENOUS BLD VENIPUNCTURE: CPT | Performed by: INTERNAL MEDICINE

## 2018-12-17 PROCEDURE — 80048 BASIC METABOLIC PNL TOTAL CA: CPT | Performed by: INTERNAL MEDICINE

## 2018-12-17 ASSESSMENT — MIFFLIN-ST. JEOR: SCORE: 1443.29

## 2018-12-17 NOTE — LETTER
12/17/2018    Manuelito Ansari MD  [address]  [facility] [address]    RE: Sylvester Davis       Dear Colleague,    I had the pleasure of seeing Sylvester Davis in the [facility].    HPI and Plan:   See dictation    Orders Placed This Encounter   Procedures     Echocardiogram Complete       No orders of the defined types were placed in this encounter.      There are no discontinued medications.      Encounter Diagnosis   Name Primary?     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       DIGOX 125 MCG tablet TAKE 1 TABLET BY MOUTH DAILY 90 tablet 3     memantine (NAMENDA) 10 MG tablet TAKE 1 TABLET BY MOUTH TWICE DAILY 180 tablet 3     metoprolol succinate (TOPROL-XL) 25 MG 24 hr tablet TAKE 1/2 TABLET BY MOUTH DAILY 45 tablet 2     OXYBUTYNIN CHLORIDE PO Take 5 mg by mouth every other day        simvastatin (ZOCOR) 20 MG tablet TAKE 1 TABLET BY MOUTH AT BEDTIME 90 tablet 3     tamsulosin (FLOMAX) 0.4 MG capsule TAKE 1 CAPSULE BY MOUTH EVERY DAY 90 capsule 2     torsemide (DEMADEX) 10 MG tablet Take 1 tablet (10 mg) by mouth daily 90 tablet 1       ALLERGIES     Allergies   Allergen Reactions     Fish Allergy      Coumadin        PAST MEDICAL HISTORY:  Past Medical History:   Diagnosis Date     Aortic valve disorders     moderate aortic stenosis & 1+ AR per 8/2014 echo     Atrial fibrillation, chronic (H)     Sees cardiologist for meds and testing such as digoxin med/levels      Atrial flutter (H)      Cardiomyopathy (H)     resolved     CHF (congestive heart failure) (H) 10/26/2012     Dementia     chronic memory loss, son saima states he is poa     History of prostate cancer 10/26/2012     Hyperlipidemia LDL goal < 130      Hypertension      Intracranial bleed (H)     while on coumadin     LVH (left  ventricular hypertrophy)     mild to moderate per 8/2014 echo     Memory loss, long term 10/26/2012    chronic memory loss, son saima states he is poa     Mitral regurgitation     1+ per 8/2014 echo     Sick sinus syndrome (H)      Unspecified cerebral artery occlusion with cerebral infarction      Urinary retention        PAST SURGICAL HISTORY:  Past Surgical History:   Procedure Laterality Date     GENITOURINARY SURGERY  2010    Resection of prostate     HEAD & NECK SURGERY  2009    Brain swelling - stopped coumadin but kept on aspirin per cardiology     wisdom teeth removal         FAMILY HISTORY:  Family History   Problem Relation Age of Onset     Cancer Mother        SOCIAL HISTORY:  Social History     Socioeconomic History     Marital status:      Spouse name: None     Number of children: None     Years of education: None     Highest education level: None   Social Needs     Financial resource strain: None     Food insecurity - worry: None     Food insecurity - inability: None     Transportation needs - medical: None     Transportation needs - non-medical: None   Occupational History     Employer: RETIRED     Comment: [employer]   Tobacco Use     Smoking status: Former Smoker     Packs/day: 1.00     Years: 10.00     Pack years: 10.00     Types: Cigarettes     Smokeless tobacco: Never Used   Substance and Sexual Activity     Alcohol use: No     Drug use: No     Sexual activity: Not Currently   Other Topics Concern     Parent/sibling w/ CABG, MI or angioplasty before 65F 55M? No      Service Not Asked     Blood Transfusions Not Asked     Caffeine Concern Not Asked     Occupational Exposure Not Asked     Hobby Hazards Not Asked     Sleep Concern Not Asked     Stress Concern Not Asked     Weight Concern Not Asked     Special Diet Not Asked     Back Care Not Asked     Exercise Yes     Comment: cycling      Bike Helmet Not Asked     Seat Belt Yes     Self-Exams Not Asked   Social History  "Narrative     None       Review of Systems:  Skin:  Negative       Eyes:  Negative      ENT:  Negative      Respiratory:  Negative       Cardiovascular:  Negative      Gastroenterology: Negative      Genitourinary:  Negative      Musculoskeletal:  Positive for arthritis    Neurologic:  Positive for memory problems    Psychiatric:  Negative      Heme/Lymph/Imm:  Negative      Endocrine:  Negative        Physical Exam:  Vitals: /86   Pulse 77   Ht 1.727 m (5' 8\")   Wt 79.4 kg (175 lb)   BMI 26.61 kg/m       Constitutional:  cooperative;well developed;well nourished   oriented to self only    Skin:  warm and dry to the touch          Head:           Eyes:  not assessed this visit        Lymph:      ENT:  no pallor or cyanosis        Neck:  carotid pulses are full and equal bilaterally        Respiratory:  clear to auscultation;normal breath sounds, clear to auscultation, normal A-P diameter, normal symmetry, normal respiratory excursion, no use of accessory muscles         Cardiac: normal S1 and S2 irregular rhythm           S1 normal, S2 normal, S1,S2, no S3 or S4 present, III/VI systolic murmur heard over the aortic area                                           GI:  abdomen soft        Extremities and Muscular Skeletal:  no edema   bilateral LE edema;trace          Neurological:           Psych:  Alert and Oriented x 3        CC  Alia Hernandez, APRN CNP  [address]  [place] [address]                Service Date: 12/17/2018      CARDIOLOGY FOLLOWUP       HISTORY OF PRESENT ILLNESS:  I had the pleasure of seeing Mr. Davis in followup at the [facility] today.  He is a very pleasant 87-year-old gentleman who I last saw in 12/2017.  He has a history of paroxysmal atrial fibrillation as well as heart failure with preserved ejection fraction.  He has a history of an intracerebral bleed and therefore has not been on Coumadin for thromboembolic prophylaxis.  He " previously had a syncopal episode that we felt was due to bradycardia.  The patient and his family, however, refused pacemaker implantation.      At his last office visit, we had discussed the Watchman device, given his high CHADS-VASc score and contraindication to anticoagulation.  Ultimately, the patient and his son decided not to pursue this further.      Today, he presents feeling well.  He specifically denies any chest pain, dyspnea on exertion, PND, orthopnea or lower extremity edema.  Denies any syncope or presyncope.  He is not very active at baseline but denies any chest discomfort, shortness of breath while performing his activities of daily living at home.      PHYSICAL EXAMINATION:  Dictated below.  I did recheck his blood pressure and it was 140/80 on my recheck.      IMPRESSION:   1.  Paroxysmal atrial fibrillation/flutter.   2.  History of intracerebral bleed in the past.  He is not on any anticoagulant therapy.  He is on low-dose aspirin.   3.  Moderate aortic stenosis.   4.  Hypertension.      Mr. Davis is doing well overall from a cardiac standpoint.  There is no evidence of angina or congestive heart failure.  His medications are appropriate and we will make no changes today.  Assuming that his clinical status remains stable, I will have him follow up in approximately 1 year.  I will check an echocardiogram to reassess his left ventricular systolic function.  I assume these findings will be unchanged compared to his prior study.         MARK MCCLAIN MD             D: 2018   T: 2018   MT: GUILHERME      Name:     LEONCIO DAVIS   MRN:      [id number]        Account:      [id number]   :      1931           Service Date: 2018      Document: [id number]       Thank you for allowing me to participate in the care of your patient.      Sincerely,     Mark Mcclain MD     [facility]    cc:   Alia Hernandez, APRN CNP  [address]  [address]  [place] [address]

## 2018-12-17 NOTE — PROGRESS NOTES
HPI and Plan:   See dictation    Orders Placed This Encounter   Procedures     Echocardiogram Complete       No orders of the defined types were placed in this encounter.      There are no discontinued medications.      Encounter Diagnosis   Name Primary?     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       DIGOX 125 MCG tablet TAKE 1 TABLET BY MOUTH DAILY 90 tablet 3     memantine (NAMENDA) 10 MG tablet TAKE 1 TABLET BY MOUTH TWICE DAILY 180 tablet 3     metoprolol succinate (TOPROL-XL) 25 MG 24 hr tablet TAKE 1/2 TABLET BY MOUTH DAILY 45 tablet 2     OXYBUTYNIN CHLORIDE PO Take 5 mg by mouth every other day        simvastatin (ZOCOR) 20 MG tablet TAKE 1 TABLET BY MOUTH AT BEDTIME 90 tablet 3     tamsulosin (FLOMAX) 0.4 MG capsule TAKE 1 CAPSULE BY MOUTH EVERY DAY 90 capsule 2     torsemide (DEMADEX) 10 MG tablet Take 1 tablet (10 mg) by mouth daily 90 tablet 1       ALLERGIES     Allergies   Allergen Reactions     Fish Allergy      Coumadin        PAST MEDICAL HISTORY:  Past Medical History:   Diagnosis Date     Aortic valve disorders     moderate aortic stenosis & 1+ AR per 8/2014 echo     Atrial fibrillation, chronic (H)     Sees cardiologist for meds and testing such as digoxin med/levels      Atrial flutter (H)      Cardiomyopathy (H)     resolved     CHF (congestive heart failure) (H) 10/26/2012     Dementia     chronic memory loss, son saima states he is poa     History of prostate cancer 10/26/2012     Hyperlipidemia LDL goal < 130      Hypertension      Intracranial bleed (H)     while on coumadin     LVH (left ventricular hypertrophy)     mild to moderate per 8/2014 echo     Memory loss, long term 10/26/2012    chronic memory loss, son saima states he is poa     Mitral regurgitation     1+ per 8/2014 echo     Sick sinus syndrome (H)      Unspecified  cerebral artery occlusion with cerebral infarction      Urinary retention        PAST SURGICAL HISTORY:  Past Surgical History:   Procedure Laterality Date     GENITOURINARY SURGERY  2010    Resection of prostate     HEAD & NECK SURGERY  2009    Brain swelling - stopped coumadin but kept on aspirin per cardiology     wisdom teeth removal         FAMILY HISTORY:  Family History   Problem Relation Age of Onset     Cancer Mother        SOCIAL HISTORY:  Social History     Socioeconomic History     Marital status:      Spouse name: None     Number of children: None     Years of education: None     Highest education level: None   Social Needs     Financial resource strain: None     Food insecurity - worry: None     Food insecurity - inability: None     Transportation needs - medical: None     Transportation needs - non-medical: None   Occupational History     Employer: RETIRED     Comment: [employer]   Tobacco Use     Smoking status: Former Smoker     Packs/day: 1.00     Years: 10.00     Pack years: 10.00     Types: Cigarettes     Smokeless tobacco: Never Used   Substance and Sexual Activity     Alcohol use: No     Drug use: No     Sexual activity: Not Currently   Other Topics Concern     Parent/sibling w/ CABG, MI or angioplasty before 65F 55M? No      Service Not Asked     Blood Transfusions Not Asked     Caffeine Concern Not Asked     Occupational Exposure Not Asked     Hobby Hazards Not Asked     Sleep Concern Not Asked     Stress Concern Not Asked     Weight Concern Not Asked     Special Diet Not Asked     Back Care Not Asked     Exercise Yes     Comment: cycling      Bike Helmet Not Asked     Seat Belt Yes     Self-Exams Not Asked   Social History Narrative     None       Review of Systems:  Skin:  Negative       Eyes:  Negative      ENT:  Negative      Respiratory:  Negative       Cardiovascular:  Negative      Gastroenterology: Negative      Genitourinary:  Negative      Musculoskeletal:   "Positive for arthritis    Neurologic:  Positive for memory problems    Psychiatric:  Negative      Heme/Lymph/Imm:  Negative      Endocrine:  Negative        Physical Exam:  Vitals: /86   Pulse 77   Ht 1.727 m (5' 8\")   Wt 79.4 kg (175 lb)   BMI 26.61 kg/m      Constitutional:  cooperative;well developed;well nourished   oriented to self only    Skin:  warm and dry to the touch          Head:           Eyes:  not assessed this visit        Lymph:      ENT:  no pallor or cyanosis        Neck:  carotid pulses are full and equal bilaterally        Respiratory:  clear to auscultation;normal breath sounds, clear to auscultation, normal A-P diameter, normal symmetry, normal respiratory excursion, no use of accessory muscles         Cardiac: normal S1 and S2 irregular rhythm           S1 normal, S2 normal, S1,S2, no S3 or S4 present, III/VI systolic murmur heard over the aortic area                                           GI:  abdomen soft        Extremities and Muscular Skeletal:  no edema   bilateral LE edema;trace          Neurological:           Psych:  Alert and Oriented x 3        CC  Alia Hernandez APRN CNP  [address]  [place] [address]              "

## 2018-12-17 NOTE — PROGRESS NOTES
Service Date: 12/17/2018      CARDIOLOGY FOLLOWUP       HISTORY OF PRESENT ILLNESS:  I had the pleasure of seeing Mr. Davis in followup at the [facility] today.  He is a very pleasant 87-year-old gentleman who I last saw in 12/2017.  He has a history of paroxysmal atrial fibrillation as well as heart failure with preserved ejection fraction.  He has a history of an intracerebral bleed and therefore has not been on Coumadin for thromboembolic prophylaxis.  He previously had a syncopal episode that we felt was due to bradycardia.  The patient and his family, however, refused pacemaker implantation.      At his last office visit, we had discussed the Watchman device, given his high CHADS-VASc score and contraindication to anticoagulation.  Ultimately, the patient and his son decided not to pursue this further.      Today, he presents feeling well.  He specifically denies any chest pain, dyspnea on exertion, PND, orthopnea or lower extremity edema.  Denies any syncope or presyncope.  He is not very active at baseline but denies any chest discomfort, shortness of breath while performing his activities of daily living at home.      PHYSICAL EXAMINATION:  Dictated below.  I did recheck his blood pressure and it was 140/80 on my recheck.      IMPRESSION:   1.  Paroxysmal atrial fibrillation/flutter.   2.  History of intracerebral bleed in the past.  He is not on any anticoagulant therapy.  He is on low-dose aspirin.   3.  Moderate aortic stenosis.   4.  Hypertension.      Mr. Davis is doing well overall from a cardiac standpoint.  There is no evidence of angina or congestive heart failure.  His medications are appropriate and we will make no changes today.   I will check an echocardiogram to reassess his left ventricular systolic function.    Assuming that his clinical status remains stable, I will have him follow up in approximately 1 year.      MARK RICHARD MD             D:  2018   T: 2018   MT: GUILHERME      Name:     LEONCIO CORDERO   MRN:      [id number]        Account:      [id number]   :      1931           Service Date: 2018      Document: [id number]

## 2018-12-17 NOTE — LETTER
12/17/2018      Manuelito Ansari MD  [address]  [facility] [address]      RE: Sylvester Davis       Dear Colleague,    I had the pleasure of seeing Sylvester Davis in the [facility].    Service Date: 12/17/2018      CARDIOLOGY FOLLOWUP       HISTORY OF PRESENT ILLNESS:  I had the pleasure of seeing Mr. Davis in followup at the [facility] today.  He is a very pleasant 87-year-old gentleman who I last saw in 12/2017.  He has a history of paroxysmal atrial fibrillation as well as heart failure with preserved ejection fraction.  He has a history of an intracerebral bleed and therefore has not been on Coumadin for thromboembolic prophylaxis.  He previously had a syncopal episode that we felt was due to bradycardia.  The patient and his family, however, refused pacemaker implantation.      At his last office visit, we had discussed the Watchman device, given his high CHADS-VASc score and contraindication to anticoagulation.  Ultimately, the patient and his son decided not to pursue this further.      Today, he presents feeling well.  He specifically denies any chest pain, dyspnea on exertion, PND, orthopnea or lower extremity edema.  Denies any syncope or presyncope.  He is not very active at baseline but denies any chest discomfort, shortness of breath while performing his activities of daily living at home.      PHYSICAL EXAMINATION:  Dictated below.  I did recheck his blood pressure and it was 140/80 on my recheck.      IMPRESSION:   1.  Paroxysmal atrial fibrillation/flutter.   2.  History of intracerebral bleed in the past.  He is not on any anticoagulant therapy.  He is on low-dose aspirin.   3.  Moderate aortic stenosis.   4.  Hypertension.      Mr. Davis is doing well overall from a cardiac standpoint.  There is no evidence of angina or congestive heart failure.  His medications are appropriate and we will make no  changes today.   I will check an echocardiogram to reassess his left ventricular systolic function.    Assuming that his clinical status remains stable, I will have him follow up in approximately 1 year.      AUGUST RICHARD MD             D: 2018   T: 2018   MT: GUILHERME      Name:     LEONCIO CORDERO   MRN:      [id number]        Account:      [id number]   :      1931           Service Date: 2018      Document: [id number]           Outpatient Encounter Medications as of 2018   Medication Sig Dispense Refill     aspirin 81 MG tablet Take 81 mg by mouth daily.       DIGOX 125 MCG tablet TAKE 1 TABLET BY MOUTH DAILY 90 tablet 3     memantine (NAMENDA) 10 MG tablet TAKE 1 TABLET BY MOUTH TWICE DAILY 180 tablet 3     metoprolol succinate (TOPROL-XL) 25 MG 24 hr tablet TAKE 1/2 TABLET BY MOUTH DAILY 45 tablet 2     OXYBUTYNIN CHLORIDE PO Take 5 mg by mouth every other day        simvastatin (ZOCOR) 20 MG tablet TAKE 1 TABLET BY MOUTH AT BEDTIME 90 tablet 3     tamsulosin (FLOMAX) 0.4 MG capsule TAKE 1 CAPSULE BY MOUTH EVERY DAY 90 capsule 2     torsemide (DEMADEX) 10 MG tablet Take 1 tablet (10 mg) by mouth daily 90 tablet 1     No facility-administered encounter medications on file as of 2018.              Again, thank you for allowing me to participate in the care of your patient.      Sincerely,    August Richard MD     [facility]

## 2018-12-18 ENCOUNTER — DOCUMENTATION ONLY (OUTPATIENT)
Dept: CARDIOLOGY | Facility: CLINIC | Age: 83
End: 2018-12-18

## 2018-12-18 ENCOUNTER — HOSPITAL ENCOUNTER (OUTPATIENT)
Dept: CARDIOLOGY | Facility: CLINIC | Age: 83
Discharge: HOME | End: 2018-12-18
Attending: INTERNAL MEDICINE | Admitting: INTERNAL MEDICINE
Payer: MEDICARE

## 2018-12-18 DIAGNOSIS — I50.42 CHRONIC COMBINED SYSTOLIC AND DIASTOLIC HEART FAILURE (H): ICD-10-CM

## 2018-12-18 DIAGNOSIS — I50.22 CHRONIC SYSTOLIC HEART FAILURE (H): ICD-10-CM

## 2018-12-18 DIAGNOSIS — I35.9 AORTIC VALVE DISORDER: Primary | ICD-10-CM

## 2018-12-18 PROCEDURE — 93306 TTE W/DOPPLER COMPLETE: CPT | Mod: 26 | Performed by: INTERNAL MEDICINE

## 2018-12-18 PROCEDURE — 93306 TTE W/DOPPLER COMPLETE: CPT

## 2018-12-18 NOTE — PROGRESS NOTES
Echo completed 12/18/18 indicated worsened EF and aortic stenosis. Routed to Dr Mcclain for review and recommendations.

## 2018-12-19 NOTE — TELEPHONE ENCOUNTER
We should probably have him come back with his son for a discussion regarding the changes in his echo findings. Could we find something for me in the next 2-3 weeks? Its OK to take a private slot. Thanks.

## 2018-12-19 NOTE — PROGRESS NOTES
"Per Dr Mcclain- \"We should probably have him come back with his son for a discussion regarding the changes in his echo findings. Could we find something for me in the next 2-3 weeks? Its OK to take a private slot. Thanks.\"     Follow up with a cardiologist ordered. Contact patient's son, Bj (consent for communication in Media), and informed him of results of echo indicating a progression of the patient's aortic stenosis and decreased EF. Informed Bj that Dr Mcclain would like to follow-up in 2-3 weeks with him and the patient to go over the results more fully. Patient's son verbalized understanding, declined to be transferred to scheduling at this time, stated he would call back to schedule.     Scheduling contacted as an FYI regarding the OV and that Dr Mcclain stated it was OK to use a private slot.     "

## 2018-12-26 ENCOUNTER — OFFICE VISIT (OUTPATIENT)
Dept: CARDIOLOGY | Facility: CLINIC | Age: 83
End: 2018-12-26
Payer: COMMERCIAL

## 2018-12-26 VITALS
SYSTOLIC BLOOD PRESSURE: 171 MMHG | BODY MASS INDEX: 26.37 KG/M2 | DIASTOLIC BLOOD PRESSURE: 91 MMHG | WEIGHT: 174 LBS | HEIGHT: 68 IN | HEART RATE: 69 BPM

## 2018-12-26 DIAGNOSIS — I50.22 CHRONIC SYSTOLIC HEART FAILURE (H): ICD-10-CM

## 2018-12-26 PROCEDURE — 99213 OFFICE O/P EST LOW 20 MIN: CPT | Performed by: INTERNAL MEDICINE

## 2018-12-26 ASSESSMENT — MIFFLIN-ST. JEOR: SCORE: 1438.76

## 2018-12-26 NOTE — LETTER
12/26/2018    Manuelito Ansari MD  [address]  [facility] [address]    RE: Sylvester Davis       Dear Colleague,    I had the pleasure of seeing Sylvester Davis in the [facility].    HPI and Plan:   See dictation    No orders of the defined types were placed in this encounter.      No orders of the defined types were placed in this encounter.      There are no discontinued medications.      Encounter Diagnosis   Name Primary?     Chronic systolic heart failure (H)        CURRENT MEDICATIONS:  Current Outpatient Medications   Medication Sig Dispense Refill     aspirin 81 MG tablet Take 81 mg by mouth daily.       DIGOX 125 MCG tablet TAKE 1 TABLET BY MOUTH DAILY 90 tablet 3     memantine (NAMENDA) 10 MG tablet TAKE 1 TABLET BY MOUTH TWICE DAILY 180 tablet 3     metoprolol succinate (TOPROL-XL) 25 MG 24 hr tablet TAKE 1/2 TABLET BY MOUTH DAILY 45 tablet 2     OXYBUTYNIN CHLORIDE PO Take 5 mg by mouth every other day        simvastatin (ZOCOR) 20 MG tablet TAKE 1 TABLET BY MOUTH AT BEDTIME 90 tablet 3     tamsulosin (FLOMAX) 0.4 MG capsule TAKE 1 CAPSULE BY MOUTH EVERY DAY 90 capsule 2     torsemide (DEMADEX) 10 MG tablet Take 1 tablet (10 mg) by mouth daily 90 tablet 1       ALLERGIES     Allergies   Allergen Reactions     Fish Allergy      Coumadin        PAST MEDICAL HISTORY:  Past Medical History:   Diagnosis Date     Aortic valve disorders     moderate aortic stenosis & 1+ AR per 8/2014 echo     Atrial fibrillation, chronic (H)     Sees cardiologist for meds and testing such as digoxin med/levels      Atrial flutter (H)      Cardiomyopathy (H)     resolved     CHF (congestive heart failure) (H) 10/26/2012     Dementia     chronic memory loss, son saima states he is poa     History of prostate cancer 10/26/2012     Hyperlipidemia LDL goal < 130      Hypertension      Intracranial bleed (H)     while on coumadin     LVH (left ventricular hypertrophy)      mild to moderate per 8/2014 echo     Memory loss, long term 10/26/2012    chronic memory loss, son saima states he is poa     Mitral regurgitation     1+ per 8/2014 echo     Sick sinus syndrome (H)      Unspecified cerebral artery occlusion with cerebral infarction      Urinary retention        PAST SURGICAL HISTORY:  Past Surgical History:   Procedure Laterality Date     GENITOURINARY SURGERY  2010    Resection of prostate     HEAD & NECK SURGERY  2009    Brain swelling - stopped coumadin but kept on aspirin per cardiology     wisdom teeth removal         FAMILY HISTORY:  Family History   Problem Relation Age of Onset     Cancer Mother        SOCIAL HISTORY:  Social History     Socioeconomic History     Marital status:      Spouse name: None     Number of children: None     Years of education: None     Highest education level: None   Social Needs     Financial resource strain: None     Food insecurity - worry: None     Food insecurity - inability: None     Transportation needs - medical: None     Transportation needs - non-medical: None   Occupational History     Employer: RETIRED     Comment: [employer]   Tobacco Use     Smoking status: Former Smoker     Packs/day: 1.00     Years: 10.00     Pack years: 10.00     Types: Cigarettes     Smokeless tobacco: Never Used   Substance and Sexual Activity     Alcohol use: No     Drug use: No     Sexual activity: Not Currently   Other Topics Concern     Parent/sibling w/ CABG, MI or angioplasty before 65F 55M? No      Service Not Asked     Blood Transfusions Not Asked     Caffeine Concern Not Asked     Occupational Exposure Not Asked     Hobby Hazards Not Asked     Sleep Concern Not Asked     Stress Concern Not Asked     Weight Concern Not Asked     Special Diet Not Asked     Back Care Not Asked     Exercise Yes     Comment: cycling      Bike Helmet Not Asked     Seat Belt Yes     Self-Exams Not Asked   Social History Narrative     None       Review of  "Systems:  Skin:  Negative       Eyes:  Negative      ENT:  Negative      Respiratory:  Negative       Cardiovascular:  Negative      Gastroenterology: Negative      Genitourinary:  Negative      Musculoskeletal:  Positive for arthritis    Neurologic:  Positive for memory problems    Psychiatric:  Negative      Heme/Lymph/Imm:  Negative      Endocrine:  Negative        Physical Exam:  Vitals: BP (!) 171/91   Pulse 69   Ht 1.727 m (5' 8\")   Wt 78.9 kg (174 lb)   BMI 26.46 kg/m       Constitutional:           Skin:             Head:           Eyes:           Lymph:      ENT:           Neck:           Respiratory:            Cardiac:                                                           GI:           Extremities and Muscular Skeletal:                 Neurological:           Psych:           CC  No referring provider defined for this encounter.                Thank you for allowing me to participate in the care of your patient.      Sincerely,     August Mcclain MD     [facility]    cc:   No referring provider defined for this encounter.        "

## 2018-12-26 NOTE — PROGRESS NOTES
HPI and Plan:   See dictation    No orders of the defined types were placed in this encounter.      No orders of the defined types were placed in this encounter.      There are no discontinued medications.      Encounter Diagnosis   Name Primary?     Chronic systolic heart failure (H)        CURRENT MEDICATIONS:  Current Outpatient Medications   Medication Sig Dispense Refill     aspirin 81 MG tablet Take 81 mg by mouth daily.       DIGOX 125 MCG tablet TAKE 1 TABLET BY MOUTH DAILY 90 tablet 3     memantine (NAMENDA) 10 MG tablet TAKE 1 TABLET BY MOUTH TWICE DAILY 180 tablet 3     metoprolol succinate (TOPROL-XL) 25 MG 24 hr tablet TAKE 1/2 TABLET BY MOUTH DAILY 45 tablet 2     OXYBUTYNIN CHLORIDE PO Take 5 mg by mouth every other day        simvastatin (ZOCOR) 20 MG tablet TAKE 1 TABLET BY MOUTH AT BEDTIME 90 tablet 3     tamsulosin (FLOMAX) 0.4 MG capsule TAKE 1 CAPSULE BY MOUTH EVERY DAY 90 capsule 2     torsemide (DEMADEX) 10 MG tablet Take 1 tablet (10 mg) by mouth daily 90 tablet 1       ALLERGIES     Allergies   Allergen Reactions     Fish Allergy      Coumadin        PAST MEDICAL HISTORY:  Past Medical History:   Diagnosis Date     Aortic valve disorders     moderate aortic stenosis & 1+ AR per 8/2014 echo     Atrial fibrillation, chronic (H)     Sees cardiologist for meds and testing such as digoxin med/levels      Atrial flutter (H)      Cardiomyopathy (H)     resolved     CHF (congestive heart failure) (H) 10/26/2012     Dementia     chronic memory loss, son saima states he is poa     History of prostate cancer 10/26/2012     Hyperlipidemia LDL goal < 130      Hypertension      Intracranial bleed (H)     while on coumadin     LVH (left ventricular hypertrophy)     mild to moderate per 8/2014 echo     Memory loss, long term 10/26/2012    chronic memory loss, son saima states he is poa     Mitral regurgitation     1+ per 8/2014 echo     Sick sinus syndrome (H)      Unspecified cerebral artery occlusion with  cerebral infarction      Urinary retention        PAST SURGICAL HISTORY:  Past Surgical History:   Procedure Laterality Date     GENITOURINARY SURGERY  2010    Resection of prostate     HEAD & NECK SURGERY  2009    Brain swelling - stopped coumadin but kept on aspirin per cardiology     wisdom teeth removal         FAMILY HISTORY:  Family History   Problem Relation Age of Onset     Cancer Mother        SOCIAL HISTORY:  Social History     Socioeconomic History     Marital status:      Spouse name: None     Number of children: None     Years of education: None     Highest education level: None   Social Needs     Financial resource strain: None     Food insecurity - worry: None     Food insecurity - inability: None     Transportation needs - medical: None     Transportation needs - non-medical: None   Occupational History     Employer: RETIRED     Comment: [employer]   Tobacco Use     Smoking status: Former Smoker     Packs/day: 1.00     Years: 10.00     Pack years: 10.00     Types: Cigarettes     Smokeless tobacco: Never Used   Substance and Sexual Activity     Alcohol use: No     Drug use: No     Sexual activity: Not Currently   Other Topics Concern     Parent/sibling w/ CABG, MI or angioplasty before 65F 55M? No      Service Not Asked     Blood Transfusions Not Asked     Caffeine Concern Not Asked     Occupational Exposure Not Asked     Hobby Hazards Not Asked     Sleep Concern Not Asked     Stress Concern Not Asked     Weight Concern Not Asked     Special Diet Not Asked     Back Care Not Asked     Exercise Yes     Comment: cycling      Bike Helmet Not Asked     Seat Belt Yes     Self-Exams Not Asked   Social History Narrative     None       Review of Systems:  Skin:  Negative       Eyes:  Negative      ENT:  Negative      Respiratory:  Negative       Cardiovascular:  Negative      Gastroenterology: Negative      Genitourinary:  Negative      Musculoskeletal:  Positive for arthritis   "  Neurologic:  Positive for memory problems    Psychiatric:  Negative      Heme/Lymph/Imm:  Negative      Endocrine:  Negative        Physical Exam:  Vitals: BP (!) 171/91   Pulse 69   Ht 1.727 m (5' 8\")   Wt 78.9 kg (174 lb)   BMI 26.46 kg/m      Constitutional:           Skin:             Head:           Eyes:           Lymph:      ENT:           Neck:           Respiratory:            Cardiac:                                                           GI:           Extremities and Muscular Skeletal:                 Neurological:           Psych:           CC  No referring provider defined for this encounter.              "

## 2018-12-26 NOTE — PROGRESS NOTES
Service Date: 12/26/2018      HISTORY OF PRESENT ILLNESS:  I had the pleasure of seeing Mr. Davis in followup at the [facility] today.  He is a very pleasant, 87-year-old gentleman whom I saw originally on 12/17/2018.  He has a history of paroxysmal atrial fibrillation as well as congestive heart failure in the past.  He also has a history of an intracerebral bleed and has not been on Coumadin for thromboembolic prophylaxis.  In the past, he has had a syncopal episode that we thought was due to bradycardia.  After a long discussion with the family, however, they declined pacemaker implantation.      We have also discussed the Watchman device given his history of an intracerebral bleed and high CHADS-VASc score.  Ultimately, the patient and his family had decided not to proceed with this intervention.      At his last office visit on 12/17/2018, I ordered an echocardiogram to reassess his left ventricular systolic function as well as to follow up on his aortic stenosis, which has previously been reported as moderate.  The echocardiogram demonstrated severe aortic stenosis with a calculated aortic valve area of 0.8 cm2 and a peak velocity of 4.1 m/sec.      The patient remains asymptomatic from a cardiac standpoint.  He specifically denies any chest pain, dyspnea on exertion, PND, orthopnea or lower extremity edema.  He denies any syncope or presyncope.      PHYSICAL EXAMINATION:  As dictated below.      IMPRESSION:   1.  Paroxysmal atrial fibrillation/flutter.   2.  History of intracerebral bleed in the past.  He is not on anticoagulant therapy.  He is on low-dose aspirin.  He has declined Watchman device consideration in the past.   3.  Severe aortic stenosis.   4.  Hypertension.      I had a detailed discussion with Mr. Davis as well as his daughter and son about the implications of the echocardiographic findings.  As I explained to them, the options at this time would include  close followup, surgical AVR or consideration for TAVR.  I do not feel he is a surgical candidate given his age and other comorbidities, so the options would be either TAVR or close followup.      At this point in time, I have recommended that we have the TAVR team contact the patient in a few weeks after they have a chance to discuss this matter further.  Obviously, if he has any symptoms of chest pain, shortness of breath or dizziness, I have asked him to contact us expeditiously.      It was a pleasure seeing him in followup.  At that point, we can decide if any further workup is indicated.         MARK RICHARD MD             D: 2018   T: 2018   MT: zo      Name:     LEONCIO CORDERO   MRN:      [id number]        Account:      [id number]   :      1931           Service Date: 2018      Document: [id number]

## 2018-12-26 NOTE — LETTER
12/26/2018      Manuelito Ansari MD  [address]  [facility] [address]      RE: Sylvester Davis       Dear Colleague,    I had the pleasure of seeing Sylvester Davis in the [facility].    Service Date: 12/26/2018      HISTORY OF PRESENT ILLNESS:  I had the pleasure of seeing Mr. Davis in followup at the [facility] today.  He is a very pleasant, 87-year-old gentleman whom I saw originally on 12/17/2018.  He has a history of paroxysmal atrial fibrillation as well as congestive heart failure in the past.  He also has a history of an intracerebral bleed and has not been on Coumadin for thromboembolic prophylaxis.  In the past, he has had a syncopal episode that we thought was due to bradycardia.  After a long discussion with the family, however, they declined pacemaker implantation.      We have also discussed the Watchman device given his history of an intracerebral bleed and high CHADS-VASc score.  Ultimately, the patient and his family had decided not to proceed with this intervention.      At his last office visit on 12/17/2018, I ordered an echocardiogram to reassess his left ventricular systolic function as well as to follow up on his aortic stenosis, which has previously been reported as moderate.  The echocardiogram demonstrated severe aortic stenosis with a calculated aortic valve area of 0.8 cm2 and a peak velocity of 4.1 m/sec.      The patient remains asymptomatic from a cardiac standpoint.  He specifically denies any chest pain, dyspnea on exertion, PND, orthopnea or lower extremity edema.  He denies any syncope or presyncope.      PHYSICAL EXAMINATION:  As dictated below.      IMPRESSION:   1.  Paroxysmal atrial fibrillation/flutter.   2.  History of intracerebral bleed in the past.  He is not on anticoagulant therapy.  He is on low-dose aspirin.  He has declined Watchman device consideration in the past.   3.   Pt. Requested to leave AMA. Encouraged Pt. To stay a bit longer allowed Pt. To sit in recliner   Severe aortic stenosis.   4.  Hypertension.      I had a detailed discussion with Mr. Cordero as well as his daughter and son about the implications of the echocardiographic findings.  As I explained to them, the options at this time would include close followup, surgical AVR or consideration for TAVR.  I do not feel he is a surgical candidate given his age and other comorbidities, so the options would be either TAVR or close followup.      At this point in time, I have recommended that we have the TAVR team contact the patient in a few weeks after they have a chance to discuss this matter further.  Obviously, if he has any symptoms of chest pain, shortness of breath or dizziness, I have asked him to contact us expeditiously.      It was a pleasure seeing him in followup.  At that point, we can decide if any further workup is indicated.         MARK RICHARD MD             D: 2018   T: 2018   MT: zo      Name:     LEONCIO CORDERO   MRN:      [id number]        Account:      [id number]   :      1931           Service Date: 2018      Document: [id number]         Outpatient Encounter Medications as of 2018   Medication Sig Dispense Refill     aspirin 81 MG tablet Take 81 mg by mouth daily.       DIGOX 125 MCG tablet TAKE 1 TABLET BY MOUTH DAILY 90 tablet 3     memantine (NAMENDA) 10 MG tablet TAKE 1 TABLET BY MOUTH TWICE DAILY 180 tablet 3     metoprolol succinate (TOPROL-XL) 25 MG 24 hr tablet TAKE 1/2 TABLET BY MOUTH DAILY 45 tablet 2     OXYBUTYNIN CHLORIDE PO Take 5 mg by mouth every other day        simvastatin (ZOCOR) 20 MG tablet TAKE 1 TABLET BY MOUTH AT BEDTIME 90 tablet 3     tamsulosin (FLOMAX) 0.4 MG capsule TAKE 1 CAPSULE BY MOUTH EVERY DAY 90 capsule 2     torsemide (DEMADEX) 10 MG tablet Take 1 tablet (10 mg) by mouth daily 90 tablet 1     No facility-administered encounter medications on file as of 2018.        Again, thank you for allowing me to  participate in the care of your patient.      Sincerely,    August Mcclain MD     [facility]

## 2019-01-01 ENCOUNTER — ANCILLARY PROCEDURE (OUTPATIENT)
Dept: GENERAL RADIOLOGY | Facility: CLINIC | Age: 84
End: 2019-01-01
Attending: FAMILY MEDICINE
Payer: MEDICARE

## 2019-01-01 ENCOUNTER — TELEPHONE (OUTPATIENT)
Dept: CARDIOLOGY | Facility: CLINIC | Age: 84
End: 2019-01-01

## 2019-01-01 ENCOUNTER — OFFICE VISIT (OUTPATIENT)
Dept: CARDIOLOGY | Facility: CLINIC | Age: 84
End: 2019-01-01
Attending: INTERNAL MEDICINE
Payer: MEDICARE

## 2019-01-01 ENCOUNTER — OFFICE VISIT (OUTPATIENT)
Dept: URGENT CARE | Facility: URGENT CARE | Age: 84
End: 2019-01-01
Payer: MEDICARE

## 2019-01-01 ENCOUNTER — OFFICE VISIT (OUTPATIENT)
Dept: CARDIOLOGY | Facility: CLINIC | Age: 84
End: 2019-01-01
Payer: MEDICARE

## 2019-01-01 ENCOUNTER — OFFICE VISIT (OUTPATIENT)
Dept: FAMILY MEDICINE | Facility: CLINIC | Age: 84
End: 2019-01-01
Payer: MEDICARE

## 2019-01-01 ENCOUNTER — TELEPHONE (OUTPATIENT)
Dept: FAMILY MEDICINE | Facility: CLINIC | Age: 84
End: 2019-01-01

## 2019-01-01 VITALS
WEIGHT: 175 LBS | TEMPERATURE: 97.3 F | RESPIRATION RATE: 12 BRPM | HEART RATE: 59 BPM | DIASTOLIC BLOOD PRESSURE: 86 MMHG | SYSTOLIC BLOOD PRESSURE: 201 MMHG | OXYGEN SATURATION: 98 % | BODY MASS INDEX: 26.61 KG/M2

## 2019-01-01 VITALS
DIASTOLIC BLOOD PRESSURE: 84 MMHG | BODY MASS INDEX: 26.15 KG/M2 | OXYGEN SATURATION: 98 % | WEIGHT: 172 LBS | RESPIRATION RATE: 18 BRPM | HEART RATE: 54 BPM | SYSTOLIC BLOOD PRESSURE: 138 MMHG

## 2019-01-01 VITALS
BODY MASS INDEX: 25.96 KG/M2 | HEIGHT: 68 IN | SYSTOLIC BLOOD PRESSURE: 122 MMHG | OXYGEN SATURATION: 97 % | DIASTOLIC BLOOD PRESSURE: 68 MMHG | WEIGHT: 171.3 LBS | HEART RATE: 51 BPM

## 2019-01-01 VITALS
OXYGEN SATURATION: 100 % | SYSTOLIC BLOOD PRESSURE: 138 MMHG | WEIGHT: 171 LBS | HEIGHT: 68 IN | TEMPERATURE: 97.9 F | DIASTOLIC BLOOD PRESSURE: 60 MMHG | BODY MASS INDEX: 25.91 KG/M2 | HEART RATE: 69 BPM | RESPIRATION RATE: 14 BRPM

## 2019-01-01 VITALS
BODY MASS INDEX: 26.91 KG/M2 | DIASTOLIC BLOOD PRESSURE: 70 MMHG | WEIGHT: 177 LBS | TEMPERATURE: 97.2 F | OXYGEN SATURATION: 99 % | HEART RATE: 43 BPM | SYSTOLIC BLOOD PRESSURE: 150 MMHG

## 2019-01-01 VITALS
DIASTOLIC BLOOD PRESSURE: 58 MMHG | HEIGHT: 68 IN | OXYGEN SATURATION: 99 % | SYSTOLIC BLOOD PRESSURE: 129 MMHG | WEIGHT: 176.6 LBS | HEART RATE: 77 BPM | BODY MASS INDEX: 26.76 KG/M2

## 2019-01-01 DIAGNOSIS — E11.21 TYPE 2 DIABETES MELLITUS WITH DIABETIC NEPHROPATHY, WITHOUT LONG-TERM CURRENT USE OF INSULIN (H): Primary | ICD-10-CM

## 2019-01-01 DIAGNOSIS — N18.30 CKD (CHRONIC KIDNEY DISEASE) STAGE 3, GFR 30-59 ML/MIN (H): ICD-10-CM

## 2019-01-01 DIAGNOSIS — R10.9 STOMACH PAIN: ICD-10-CM

## 2019-01-01 DIAGNOSIS — I50.42 CHRONIC COMBINED SYSTOLIC AND DIASTOLIC HEART FAILURE (H): ICD-10-CM

## 2019-01-01 DIAGNOSIS — Z85.46 HISTORY OF PROSTATE CANCER: ICD-10-CM

## 2019-01-01 DIAGNOSIS — E78.5 HYPERLIPIDEMIA LDL GOAL <130: ICD-10-CM

## 2019-01-01 DIAGNOSIS — E78.2 MIXED HYPERLIPIDEMIA: ICD-10-CM

## 2019-01-01 DIAGNOSIS — I50.22 CHRONIC SYSTOLIC HEART FAILURE (H): ICD-10-CM

## 2019-01-01 DIAGNOSIS — I35.0 AORTIC VALVE STENOSIS, ETIOLOGY OF CARDIAC VALVE DISEASE UNSPECIFIED: ICD-10-CM

## 2019-01-01 DIAGNOSIS — M54.50 ACUTE RIGHT-SIDED LOW BACK PAIN WITHOUT SCIATICA: Primary | ICD-10-CM

## 2019-01-01 DIAGNOSIS — I10 ESSENTIAL HYPERTENSION: ICD-10-CM

## 2019-01-01 DIAGNOSIS — G30.9 ALZHEIMER'S DEMENTIA WITHOUT BEHAVIORAL DISTURBANCE, UNSPECIFIED TIMING OF DEMENTIA ONSET: ICD-10-CM

## 2019-01-01 DIAGNOSIS — F02.80 ALZHEIMER'S DEMENTIA WITHOUT BEHAVIORAL DISTURBANCE, UNSPECIFIED TIMING OF DEMENTIA ONSET: ICD-10-CM

## 2019-01-01 DIAGNOSIS — I50.22 CHRONIC SYSTOLIC CONGESTIVE HEART FAILURE (H): ICD-10-CM

## 2019-01-01 DIAGNOSIS — I10 MALIGNANT ESSENTIAL HYPERTENSION: ICD-10-CM

## 2019-01-01 DIAGNOSIS — I35.9 AORTIC VALVE DISORDER: Primary | ICD-10-CM

## 2019-01-01 DIAGNOSIS — D63.1 ANEMIA DUE TO CHRONIC KIDNEY DISEASE, UNSPECIFIED CKD STAGE: ICD-10-CM

## 2019-01-01 DIAGNOSIS — I10 ESSENTIAL HYPERTENSION: Chronic | ICD-10-CM

## 2019-01-01 DIAGNOSIS — I35.0 SEVERE AORTIC STENOSIS: Primary | ICD-10-CM

## 2019-01-01 DIAGNOSIS — N18.9 ANEMIA DUE TO CHRONIC KIDNEY DISEASE, UNSPECIFIED CKD STAGE: ICD-10-CM

## 2019-01-01 LAB
ALBUMIN SERPL-MCNC: 3.6 G/DL (ref 3.4–5)
ALBUMIN UR-MCNC: >=300 MG/DL
ALP SERPL-CCNC: 93 U/L (ref 40–150)
ALT SERPL W P-5'-P-CCNC: 15 U/L (ref 0–70)
ANION GAP SERPL CALCULATED.3IONS-SCNC: 8 MMOL/L (ref 3–14)
APPEARANCE UR: CLEAR
AST SERPL W P-5'-P-CCNC: 21 U/L (ref 0–45)
BASOPHILS # BLD AUTO: 0 10E9/L (ref 0–0.2)
BASOPHILS # BLD AUTO: 0 10E9/L (ref 0–0.2)
BASOPHILS NFR BLD AUTO: 0 %
BASOPHILS NFR BLD AUTO: 0.3 %
BILIRUB SERPL-MCNC: 1.9 MG/DL (ref 0.2–1.3)
BILIRUB UR QL STRIP: NEGATIVE
BUN SERPL-MCNC: 22 MG/DL (ref 7–30)
CALCIUM SERPL-MCNC: 8.5 MG/DL (ref 8.5–10.1)
CHLORIDE SERPL-SCNC: 109 MMOL/L (ref 94–109)
CHOLEST SERPL-MCNC: 119 MG/DL
CHOLEST SERPL-MCNC: 79 MG/DL
CO2 SERPL-SCNC: 23 MMOL/L (ref 20–32)
COLOR UR AUTO: YELLOW
CREAT SERPL-MCNC: 1.26 MG/DL (ref 0.66–1.25)
CREAT UR-MCNC: 138 MG/DL
CREAT UR-MCNC: 167 MG/DL
DIFFERENTIAL METHOD BLD: ABNORMAL
DIFFERENTIAL METHOD BLD: ABNORMAL
DIGOXIN SERPL-MCNC: 1.6 UG/L (ref 0.5–2)
EOSINOPHIL # BLD AUTO: 0.2 10E9/L (ref 0–0.7)
EOSINOPHIL # BLD AUTO: 0.2 10E9/L (ref 0–0.7)
EOSINOPHIL NFR BLD AUTO: 2.4 %
EOSINOPHIL NFR BLD AUTO: 2.7 %
ERYTHROCYTE [DISTWIDTH] IN BLOOD BY AUTOMATED COUNT: 14.2 % (ref 10–15)
ERYTHROCYTE [DISTWIDTH] IN BLOOD BY AUTOMATED COUNT: 15.1 % (ref 10–15)
GFR SERPL CREATININE-BSD FRML MDRD: 51 ML/MIN/{1.73_M2}
GLUCOSE SERPL-MCNC: 120 MG/DL (ref 70–99)
GLUCOSE UR STRIP-MCNC: NEGATIVE MG/DL
HBA1C MFR BLD: 5.3 % (ref 0–5.6)
HBA1C MFR BLD: 7.6 % (ref 0–5.6)
HCT VFR BLD AUTO: 34 % (ref 40–53)
HCT VFR BLD AUTO: 36 % (ref 40–53)
HDLC SERPL-MCNC: 27 MG/DL
HDLC SERPL-MCNC: 36 MG/DL
HGB BLD-MCNC: 11.1 G/DL (ref 13.3–17.7)
HGB BLD-MCNC: 11.6 G/DL (ref 13.3–17.7)
HGB UR QL STRIP: ABNORMAL
HYALINE CASTS #/AREA URNS LPF: ABNORMAL /LPF
KETONES UR STRIP-MCNC: NEGATIVE MG/DL
LDLC SERPL CALC-MCNC: 29 MG/DL
LDLC SERPL CALC-MCNC: 50 MG/DL
LEUKOCYTE ESTERASE UR QL STRIP: NEGATIVE
LYMPHOCYTES # BLD AUTO: 1.2 10E9/L (ref 0.8–5.3)
LYMPHOCYTES # BLD AUTO: 1.2 10E9/L (ref 0.8–5.3)
LYMPHOCYTES NFR BLD AUTO: 17.7 %
LYMPHOCYTES NFR BLD AUTO: 19.4 %
MCH RBC QN AUTO: 28.8 PG (ref 26.5–33)
MCH RBC QN AUTO: 29.9 PG (ref 26.5–33)
MCHC RBC AUTO-ENTMCNC: 32.2 G/DL (ref 31.5–36.5)
MCHC RBC AUTO-ENTMCNC: 32.6 G/DL (ref 31.5–36.5)
MCV RBC AUTO: 89 FL (ref 78–100)
MCV RBC AUTO: 92 FL (ref 78–100)
MICROALBUMIN UR-MCNC: 2220 MG/L
MICROALBUMIN UR-MCNC: 306 MG/L
MICROALBUMIN/CREAT UR: 1329.34 MG/G CR (ref 0–17)
MICROALBUMIN/CREAT UR: 221.74 MG/G CR (ref 0–17)
MONOCYTES # BLD AUTO: 0.8 10E9/L (ref 0–1.3)
MONOCYTES # BLD AUTO: 0.8 10E9/L (ref 0–1.3)
MONOCYTES NFR BLD AUTO: 11.4 %
MONOCYTES NFR BLD AUTO: 12.2 %
NEUTROPHILS # BLD AUTO: 4 10E9/L (ref 1.6–8.3)
NEUTROPHILS # BLD AUTO: 4.6 10E9/L (ref 1.6–8.3)
NEUTROPHILS NFR BLD AUTO: 65.7 %
NEUTROPHILS NFR BLD AUTO: 68.2 %
NITRATE UR QL: NEGATIVE
NON-SQ EPI CELLS #/AREA URNS LPF: ABNORMAL /LPF
NONHDLC SERPL-MCNC: 52 MG/DL
NONHDLC SERPL-MCNC: 83 MG/DL
PH UR STRIP: 5.5 PH (ref 5–7)
PLATELET # BLD AUTO: 190 10E9/L (ref 150–450)
PLATELET # BLD AUTO: 208 10E9/L (ref 150–450)
POTASSIUM SERPL-SCNC: 4.1 MMOL/L (ref 3.4–5.3)
PROT SERPL-MCNC: 7.5 G/DL (ref 6.8–8.8)
RBC # BLD AUTO: 3.71 10E12/L (ref 4.4–5.9)
RBC # BLD AUTO: 4.03 10E12/L (ref 4.4–5.9)
RBC #/AREA URNS AUTO: ABNORMAL /HPF
SODIUM SERPL-SCNC: 140 MMOL/L (ref 133–144)
SOURCE: ABNORMAL
SP GR UR STRIP: 1.02 (ref 1–1.03)
TRIGL SERPL-MCNC: 116 MG/DL
TRIGL SERPL-MCNC: 166 MG/DL
TSH SERPL DL<=0.005 MIU/L-ACNC: 2.42 MU/L (ref 0.4–4)
TSH SERPL DL<=0.005 MIU/L-ACNC: 3.03 MU/L (ref 0.4–4)
UROBILINOGEN UR STRIP-ACNC: 1 EU/DL (ref 0.2–1)
WBC # BLD AUTO: 6.1 10E9/L (ref 4–11)
WBC # BLD AUTO: 6.8 10E9/L (ref 4–11)
WBC #/AREA URNS AUTO: ABNORMAL /HPF

## 2019-01-01 PROCEDURE — 99214 OFFICE O/P EST MOD 30 MIN: CPT | Performed by: FAMILY MEDICINE

## 2019-01-01 PROCEDURE — 82043 UR ALBUMIN QUANTITATIVE: CPT | Performed by: FAMILY MEDICINE

## 2019-01-01 PROCEDURE — 74019 RADEX ABDOMEN 2 VIEWS: CPT

## 2019-01-01 PROCEDURE — 99213 OFFICE O/P EST LOW 20 MIN: CPT | Performed by: FAMILY MEDICINE

## 2019-01-01 PROCEDURE — 80061 LIPID PANEL: CPT | Performed by: FAMILY MEDICINE

## 2019-01-01 PROCEDURE — 36415 COLL VENOUS BLD VENIPUNCTURE: CPT | Performed by: FAMILY MEDICINE

## 2019-01-01 PROCEDURE — 85025 COMPLETE CBC W/AUTO DIFF WBC: CPT | Performed by: FAMILY MEDICINE

## 2019-01-01 PROCEDURE — 81001 URINALYSIS AUTO W/SCOPE: CPT | Performed by: FAMILY MEDICINE

## 2019-01-01 PROCEDURE — 93000 ELECTROCARDIOGRAM COMPLETE: CPT | Performed by: FAMILY MEDICINE

## 2019-01-01 PROCEDURE — 84443 ASSAY THYROID STIM HORMONE: CPT | Performed by: FAMILY MEDICINE

## 2019-01-01 PROCEDURE — 83036 HEMOGLOBIN GLYCOSYLATED A1C: CPT | Performed by: FAMILY MEDICINE

## 2019-01-01 PROCEDURE — 99214 OFFICE O/P EST MOD 30 MIN: CPT | Performed by: PHYSICIAN ASSISTANT

## 2019-01-01 PROCEDURE — 72100 X-RAY EXAM L-S SPINE 2/3 VWS: CPT

## 2019-01-01 PROCEDURE — 80053 COMPREHEN METABOLIC PANEL: CPT | Performed by: FAMILY MEDICINE

## 2019-01-01 PROCEDURE — 99214 OFFICE O/P EST MOD 30 MIN: CPT | Mod: 25 | Performed by: FAMILY MEDICINE

## 2019-01-01 PROCEDURE — 80162 ASSAY OF DIGOXIN TOTAL: CPT | Performed by: FAMILY MEDICINE

## 2019-01-01 PROCEDURE — 99213 OFFICE O/P EST LOW 20 MIN: CPT | Performed by: PHYSICIAN ASSISTANT

## 2019-01-01 RX ORDER — METOPROLOL SUCCINATE 25 MG/1
TABLET, EXTENDED RELEASE ORAL
Qty: 45 TABLET | Refills: 3 | Status: SHIPPED | OUTPATIENT
Start: 2019-01-01

## 2019-01-01 RX ORDER — TAMSULOSIN HYDROCHLORIDE 0.4 MG/1
CAPSULE ORAL
Qty: 90 CAPSULE | Refills: 0 | Status: SHIPPED | OUTPATIENT
Start: 2019-01-01 | End: 2019-01-01

## 2019-01-01 RX ORDER — SIMVASTATIN 20 MG
TABLET ORAL
Qty: 90 TABLET | Refills: 0 | OUTPATIENT
Start: 2019-01-01

## 2019-01-01 RX ORDER — OXYBUTYNIN CHLORIDE 5 MG/1
5 TABLET ORAL 2 TIMES DAILY
Qty: 180 TABLET | Refills: 3 | Status: SHIPPED | OUTPATIENT
Start: 2019-01-01

## 2019-01-01 RX ORDER — TAMSULOSIN HYDROCHLORIDE 0.4 MG/1
CAPSULE ORAL
Qty: 90 CAPSULE | Refills: 3 | Status: SHIPPED | OUTPATIENT
Start: 2019-01-01

## 2019-01-01 RX ORDER — DIGOXIN 125 MCG
125 TABLET ORAL DAILY
Qty: 90 TABLET | Refills: 3 | Status: SHIPPED | OUTPATIENT
Start: 2019-01-01 | End: 2020-01-01

## 2019-01-01 RX ORDER — SIMVASTATIN 20 MG
TABLET ORAL
Qty: 90 TABLET | Refills: 0 | Status: SHIPPED | OUTPATIENT
Start: 2019-01-01 | End: 2020-01-01

## 2019-01-01 RX ORDER — DIGOXIN 125 MCG
TABLET ORAL
Qty: 90 TABLET | Refills: 0 | Status: SHIPPED | OUTPATIENT
Start: 2019-01-01 | End: 2019-01-01

## 2019-01-01 RX ORDER — LOSARTAN POTASSIUM 50 MG/1
50 TABLET ORAL DAILY
Qty: 30 TABLET | Refills: 3 | Status: SHIPPED | OUTPATIENT
Start: 2019-01-01 | End: 2019-01-01 | Stop reason: SINTOL

## 2019-01-01 ASSESSMENT — PATIENT HEALTH QUESTIONNAIRE - PHQ9: SUM OF ALL RESPONSES TO PHQ QUESTIONS 1-9: 1

## 2019-01-01 ASSESSMENT — MIFFLIN-ST. JEOR
SCORE: 1450.55
SCORE: 1426.51
SCORE: 1425.15

## 2019-01-24 DIAGNOSIS — I10 ESSENTIAL HYPERTENSION: Chronic | ICD-10-CM

## 2019-01-28 NOTE — TELEPHONE ENCOUNTER
Scheduled BP re-check for 2/1/19.   Routing refill request to provider for review/approval because:  BP out of range:   12/26/18 (!) 171/91   12/17/18 167/86

## 2019-01-29 RX ORDER — METOPROLOL SUCCINATE 25 MG/1
TABLET, EXTENDED RELEASE ORAL
Qty: 45 TABLET | Refills: 0 | Status: SHIPPED | OUTPATIENT
Start: 2019-01-29 | End: 2019-05-01

## 2019-02-01 ENCOUNTER — ALLIED HEALTH/NURSE VISIT (OUTPATIENT)
Dept: NURSING | Facility: CLINIC | Age: 84
End: 2019-02-01
Payer: MEDICARE

## 2019-02-01 VITALS
OXYGEN SATURATION: 96 % | HEART RATE: 72 BPM | RESPIRATION RATE: 14 BRPM | SYSTOLIC BLOOD PRESSURE: 158 MMHG | DIASTOLIC BLOOD PRESSURE: 70 MMHG

## 2019-02-01 DIAGNOSIS — I10 ESSENTIAL HYPERTENSION: Primary | ICD-10-CM

## 2019-02-01 PROCEDURE — 99207 ZZC NO CHARGE LOS: CPT

## 2019-02-01 NOTE — NURSING NOTE
Patient here for recheck on B/P per Dr. Ansrai. Patient did not bring flow sheet from home monitors. Chart sent to Dr. Ansari for review.

## 2019-02-25 DIAGNOSIS — Z85.46 HISTORY OF PROSTATE CANCER: ICD-10-CM

## 2019-02-26 NOTE — TELEPHONE ENCOUNTER
"TAMSULOSIN 0.4MG CAPSULES  Last Written Prescription Date:  05/02/18  Last Fill Quantity: 90,  # refills: 2   Last office visit: 7/24/2018 with prescribing provider:  07/24/18   Future Office Visit:    Requested Prescriptions   Pending Prescriptions Disp Refills     tamsulosin (FLOMAX) 0.4 MG capsule [Pharmacy Med Name: TAMSULOSIN 0.4MG CAPSULES] 90 capsule 0     Sig: TAKE 1 CAPSULE BY MOUTH EVERY DAY    Alpha Blockers Failed - 2/25/2019  5:11 PM       Failed - Blood pressure under 140/90 in past 12 months    BP Readings from Last 3 Encounters:   02/01/19 158/70   12/26/18 (!) 171/91   12/17/18 167/86                Passed - Recent (12 mo) or future (30 days) visit within the authorizing provider's specialty    Patient had office visit in the last 12 months or has a visit in the next 30 days with authorizing provider or within the authorizing provider's specialty.  See \"Patient Info\" tab in inbasket, or \"Choose Columns\" in Meds & Orders section of the refill encounter.             Passed - Patient does not have Tadalafil, Vardenafil, or Sildenafil on their medication list       Passed - Medication is active on med list       Passed - Patient is 18 years of age or older          "

## 2019-02-27 RX ORDER — TAMSULOSIN HYDROCHLORIDE 0.4 MG/1
CAPSULE ORAL
Qty: 90 CAPSULE | Refills: 0 | Status: SHIPPED | OUTPATIENT
Start: 2019-02-27 | End: 2019-01-01

## 2019-02-27 NOTE — TELEPHONE ENCOUNTER
Routing refill request to provider for review/approval because:  Blood pressure out of range

## 2019-03-23 DIAGNOSIS — F03.90 DEMENTIA (H): ICD-10-CM

## 2019-03-25 RX ORDER — MEMANTINE HCL 10 MG
TABLET ORAL
Qty: 180 TABLET | Refills: 0 | Status: SHIPPED | OUTPATIENT
Start: 2019-03-25 | End: 2019-01-01

## 2019-03-25 NOTE — TELEPHONE ENCOUNTER
Medication is being filled for 1 time refill only due to:  Note to pharmacy to schedule Px   Naty Murray RN- Triage [employer]

## 2019-03-25 NOTE — TELEPHONE ENCOUNTER
"Requested Prescriptions   Pending Prescriptions Disp Refills     NAMENDA 10 MG tablet [Pharmacy Med Name: NAMENDA 10MG TABLETS] 180 tablet 0    Last Written Prescription Date:  3/5/18  Last Fill Quantity: 180,  # refills: 3   Last office visit: 7/24/2018 with prescribing provider:  Coty   Future Office Visit:     Sig: TAKE 1 TABLET BY MOUTH TWICE DAILY    Miscellaneous Dementia Agents Passed - 3/23/2019  3:57 AM       Passed - Recent (12 mo) or future (30 days) visit within the authorizing provider's specialty    Patient had office visit in the last 12 months or has a visit in the next 30 days with authorizing provider or within the authorizing provider's specialty.  See \"Patient Info\" tab in inbasket, or \"Choose Columns\" in Meds & Orders section of the refill encounter.             Passed - Medication is active on med list       Passed - Patient is 18 years of age or older          "

## 2019-05-01 DIAGNOSIS — I10 ESSENTIAL HYPERTENSION: Chronic | ICD-10-CM

## 2019-05-01 NOTE — TELEPHONE ENCOUNTER
"Requested Prescriptions   Pending Prescriptions Disp Refills     metoprolol succinate ER (TOPROL-XL) 25 MG 24 hr tablet [Pharmacy Med Name: METOPROLOL ER SUCCINATE 25MG TABS]  Last Written Prescription Date:  1/29/2019  Last Fill Quantity: 45 tablet,  # refills: 0   Last office visit: 7/24/2018 with prescribing provider:  BRENTON Wagoner   Future Office Visit:   Next 5 appointments (look out 90 days)    May 31, 2019  1:50 PM CDT  Return Visit with Herberth Millard PA-C  [facility] ([facility]) [address] [place] [id number]  [phone] OPT 2          45 tablet 0     Sig: TAKE 1/2 TABLET BY MOUTH DAILY       Beta-Blockers Protocol Failed - 5/1/2019  3:57 AM        Failed - Blood pressure under 140/90 in past 12 months     BP Readings from Last 3 Encounters:   02/01/19 158/70   12/26/18 (!) 171/91   12/17/18 167/86                 Passed - Patient is age 6 or older        Passed - Recent (12 mo) or future (30 days) visit within the authorizing provider's specialty     Patient had office visit in the last 12 months or has a visit in the next 30 days with authorizing provider or within the authorizing provider's specialty.  See \"Patient Info\" tab in inbasket, or \"Choose Columns\" in Meds & Orders section of the refill encounter.              Passed - Medication is active on med list           "

## 2019-05-02 NOTE — TELEPHONE ENCOUNTER
Routing refill request to provider for review/approval because:  Failed - Blood pressure under 140/90 in past 12 months             BP Readings from Last 3 Encounters:   02/01/19 158/70   12/26/18 (!) 171/91   12/17/18 167/86

## 2019-05-04 RX ORDER — METOPROLOL SUCCINATE 25 MG/1
TABLET, EXTENDED RELEASE ORAL
Qty: 45 TABLET | Refills: 0 | Status: SHIPPED | OUTPATIENT
Start: 2019-05-04 | End: 2019-01-01

## 2019-05-28 NOTE — TELEPHONE ENCOUNTER
"Requested Prescriptions   Pending Prescriptions Disp Refills     tamsulosin (FLOMAX) 0.4 MG capsule [Pharmacy Med Name: TAMSULOSIN 0.4MG CAPSULES]  Last Written Prescription Date:  2/27/2019  Last Fill Quantity: 90 capsule,  # refills: 0   Last office visit: 7/24/2018 with prescribing provider:  BRENTON Wagoner   Future Office Visit:   Next 5 appointments (look out 90 days)    May 31, 2019  1:50 PM CDT  Return Visit with Herberth Millard PA-C  [facility] ([facility]) [address] [place] [id number] [phone] OPT 2          90 capsule 0     Sig: TAKE 1 CAPSULE BY MOUTH EVERY DAY       Alpha Blockers Failed - 5/28/2019  3:58 AM        Failed - Blood pressure under 140/90 in past 12 months     BP Readings from Last 3 Encounters:   02/01/19 158/70   12/26/18 (!) 171/91   12/17/18 167/86                 Passed - Recent (12 mo) or future (30 days) visit within the authorizing provider's specialty     Patient had office visit in the last 12 months or has a visit in the next 30 days with authorizing provider or within the authorizing provider's specialty.  See \"Patient Info\" tab in inbasket, or \"Choose Columns\" in Meds & Orders section of the refill encounter.              Passed - Patient does not have Tadalafil, Vardenafil, or Sildenafil on their medication list        Passed - Medication is active on med list        Passed - Patient is 18 years of age or older           "

## 2019-05-31 NOTE — LETTER
5/31/2019    Manuelito Ansari MD  [address]  [facility] [address]    RE: Sylvester Davis       Dear Colleague,    I had the pleasure of seeing Sylvester Davis in the [facility].    Primary Cardiologist: Dr. Mcclain     Reason for Visit: Routine Follow Up    History of Present Illness:   This is a very pleasant 87-year-old gentleman with past medical history notable for paroxysmal flutter (rate control strategy, he is not on anticoagulation due to history of intracerebral bleed; he has declined watchman device in the past), severe aortic valve stenosis (his echocardiogram in 12/2018 demonstrated severe stenosis of his valve, he was referred to TAVR, it appears that patient and his family wanted to think about this option more), hypertension, history of syncopal episode in the past that was felt to be due to bradycardia (pacemaker implantation was recommended at this time but both patient and his family declined this), and history of chronic diastolic heart failure (maintained on Torsemide 10 mg daily).     He presents to clinic today with his son, stating he is overall doing well.  He does get shortness of breath occasionally but this is not persistent.  He denies orthopnea, PND, peripheral edema, chest discomfort, palpitations, or lightheadedness.  He will be seeing his primary care provider next month where he will get digoxin level and ECG done.     Assessment and Plan:   This is a very pleasant 87-year-old gentleman with past medical history notable for paroxysmal flutter (rate control strategy, he is not on anticoagulation due to history of intracerebral bleed; he has declined watchman device in the past), severe aortic valve stenosis (his echocardiogram in 12/2018 demonstrated severe stenosis of his valve, he was referred to TAVR, it appears that patient and his family wanted to think about this option more), hypertension, history of syncopal episode in  the past that was felt to be due to bradycardia (pacemaker implantation was recommended at this time but both patient and his family declined this), and history of chronic diastolic heart failure (maintained on Torsemide 10 mg daily).     We had a detailed discussion about TAVR. Both patient and his son are not interested in this and are aware of the risks and benefits. They would like to follow up with us regularly. He will be set up with Dr. Mcclain in 6 months.    I was planning to order digoxin level and do ECG but patient will be getting these during his PCP visit next month.     Thank you for allowing me to participate in the care of this pleasant patient today.    This note was completed in part using Dragon voice recognition software. Although reviewed after completion, some word and grammatical errors may occur.    Orders this Visit:  No orders of the defined types were placed in this encounter.    No orders of the defined types were placed in this encounter.    There are no discontinued medications.      Encounter Diagnosis   Name Primary?     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       DIGOX 125 MCG tablet TAKE 1 TABLET BY MOUTH DAILY 90 tablet 1     metoprolol succinate ER (TOPROL-XL) 25 MG 24 hr tablet TAKE 1/2 TABLET BY MOUTH DAILY 45 tablet 0     NAMENDA 10 MG tablet TAKE 1 TABLET BY MOUTH TWICE DAILY 180 tablet 0     OXYBUTYNIN CHLORIDE PO Take 5 mg by mouth every other day        simvastatin (ZOCOR) 20 MG tablet TAKE 1 TABLET BY MOUTH AT BEDTIME 90 tablet 1     tamsulosin (FLOMAX) 0.4 MG capsule TAKE 1 CAPSULE BY MOUTH EVERY DAY 90 capsule 0     torsemide (DEMADEX) 10 MG tablet Take 1 tablet (10 mg) by mouth daily 90 tablet 1       ALLERGIES     Allergies   Allergen Reactions     Fish Allergy      Coumadin        PAST MEDICAL HISTORY:  Past  Medical History:   Diagnosis Date     Aortic valve disorders     moderate aortic stenosis & 1+ AR per 8/2014 echo     Atrial fibrillation, chronic (H)     Sees cardiologist for meds and testing such as digoxin med/levels      Atrial flutter (H)      Cardiomyopathy (H)     resolved     CHF (congestive heart failure) (H) 10/26/2012     Dementia     chronic memory loss, son saima states he is poa     History of prostate cancer 10/26/2012     Hyperlipidemia LDL goal < 130      Hypertension      Intracranial bleed (H)     while on coumadin     LVH (left ventricular hypertrophy)     mild to moderate per 8/2014 echo     Memory loss, long term 10/26/2012    chronic memory loss, son saima states he is poa     Mitral regurgitation     1+ per 8/2014 echo     Sick sinus syndrome (H)      Unspecified cerebral artery occlusion with cerebral infarction      Urinary retention        PAST SURGICAL HISTORY:  Past Surgical History:   Procedure Laterality Date     GENITOURINARY SURGERY  2010    Resection of prostate     HEAD & NECK SURGERY  2009    Brain swelling - stopped coumadin but kept on aspirin per cardiology     wisdom teeth removal         FAMILY HISTORY:  Family History   Problem Relation Age of Onset     Cancer Mother        SOCIAL HISTORY:  Social History     Socioeconomic History     Marital status:      Spouse name: Not on file     Number of children: Not on file     Years of education: Not on file     Highest education level: Not on file   Occupational History     Employer: RETIRED     Comment: [employer]   Social Needs     Financial resource strain: Not on file     Food insecurity:     Worry: Not on file     Inability: Not on file     Transportation needs:     Medical: Not on file     Non-medical: Not on file   Tobacco Use     Smoking status: Former Smoker     Packs/day: 1.00     Years: 10.00     Pack years: 10.00     Types: Cigarettes     Smokeless tobacco: Never Used   Substance and Sexual Activity      Alcohol use: No     Drug use: No     Sexual activity: Not Currently   Lifestyle     Physical activity:     Days per week: Not on file     Minutes per session: Not on file     Stress: Not on file   Relationships     Social connections:     Talks on phone: Not on file     Gets together: Not on file     Attends Jew service: Not on file     Active member of club or organization: Not on file     Attends meetings of clubs or organizations: Not on file     Relationship status: Not on file     Intimate partner violence:     Fear of current or ex partner: Not on file     Emotionally abused: Not on file     Physically abused: Not on file     Forced sexual activity: Not on file   Other Topics Concern     Parent/sibling w/ CABG, MI or angioplasty before 65F 55M? No      Service Not Asked     Blood Transfusions Not Asked     Caffeine Concern Not Asked     Occupational Exposure Not Asked     Hobby Hazards Not Asked     Sleep Concern Not Asked     Stress Concern Not Asked     Weight Concern Not Asked     Special Diet Not Asked     Back Care Not Asked     Exercise Yes     Comment: cycling      Bike Helmet Not Asked     Seat Belt Yes     Self-Exams Not Asked   Social History Narrative     Not on file       Review of Systems:  Skin:        Eyes:       ENT:       Respiratory:       Cardiovascular:       Gastroenterology:      Genitourinary:       Musculoskeletal:       Neurologic:       Psychiatric:       Heme/Lymph/Imm:       Endocrine:         Physical Exam:  Vitals: There were no vitals taken for this visit.     GEN:  NAD  NECK: No JVD  C/V:  Irregularly irregular rhythm. Normal rate. 2/6 LINUS at RUSB.  RESP: Clear to auscultation bilaterally without wheezing, rales, or rhonchi.  GI: Abdomen soft, nontender, nondistended.   EXTREM: Trace LE edema.   NEURO: Alert and oriented, cooperative. No obvious focal deficits.   PSYCH: Normal affect.  SKIN: Warm and dry.       Recent Lab Results:  LIPID RESULTS:  Lab Results    Component Value Date    CHOL 104 07/24/2018    HDL 35 (L) 07/24/2018    LDL 35 07/24/2018    TRIG 169 (H) 07/24/2018    CHOLHDLRATIO 3.3 10/30/2015       LIVER ENZYME RESULTS:  Lab Results   Component Value Date    AST 15 10/07/2017    ALT 14 07/24/2018       CBC RESULTS:  Lab Results   Component Value Date    WBC 5.1 10/07/2017    RBC 3.95 (L) 10/07/2017    HGB 11.8 (L) 07/24/2018    HCT 36.6 (L) 10/07/2017    MCV 93 10/07/2017    MCH 31.1 10/07/2017    MCHC 33.6 10/07/2017    RDW 13.4 10/07/2017     10/07/2017       BMP RESULTS:  Lab Results   Component Value Date     12/17/2018    POTASSIUM 3.9 12/17/2018    CHLORIDE 101 12/17/2018    CO2 30 (H) 12/17/2018    ANIONGAP 9.9 12/17/2018     (H) 12/17/2018    BUN 10 12/17/2018    CR 1.29 12/17/2018    GFRESTIMATED 53 (L) 12/17/2018    GFRESTBLACK 64 12/17/2018    TY 9.4 12/17/2018        A1C RESULTS:  Lab Results   Component Value Date    A1C 5.4 07/24/2018       INR RESULTS:  Lab Results   Component Value Date    INR 1.84 (H) 01/04/2010    INR 3.13 (H) 11/30/2009           Herberth Millard PA-C   May 31, 2019     Thank you for allowing me to participate in the care of your patient.    Sincerely,     Herberth Millard PA-C     [facility]

## 2019-05-31 NOTE — PATIENT INSTRUCTIONS
Today's Plan:   1) Let us know if you change your mind about the valve procedure. The procedure is called Transcatheter Aortic Valve Replacement (TAVR).   2) I will set up follow up with Dr. Mcclain in 6 months.     If you have questions or concerns please call my nurse team at [phone].     Scheduling phone number: [phone]  Reminder: Please bring in all current medications, over the counter supplements and vitamin bottles to your next appointment.    It was a pleasure seeing you today!     Herberth Millard  5/31/2019

## 2019-05-31 NOTE — TELEPHONE ENCOUNTER
"Call received from Herberth clarifying reason for OV today. Order was entered for 6mo KAREN f/up in June 2019 by Dr Mcclain at last OV 12/26/19.     Per OV note, \" had a detailed discussion with Mr. Davis as well as his daughter and son about the implications of the echocardiographic findings.  As I explained to them, the options at this time would include close followup, surgical AVR or consideration for TAVR.  I do not feel he is a surgical candidate given his age and other comorbidities, so the options would be either TAVR or close followup.      At this point in time, I have recommended that we have the TAVR team contact the patient in a few weeks after they have a chance to discuss this matter further.  Obviously, if he has any symptoms of chest pain, shortness of breath or dizziness, I have asked him to contact us expeditiously.\"     Contacted patient's son, Bj, to discuss. Bj states that when the saw Dr Mcclain in December they voiced that they were not interested in TAVR at this time as the patient \"had a lot going on,\" and that they preferred to wait \"until something happened.\" Bj states that the patient continues to be short of breath but that overall he is \"feeling OK.\" Bj states they would like to keep the appointment today. Discussed with Herberth who states she will assess the patient today and review indications for TAVR consult. Melissa Dozier also notified.   "

## 2019-05-31 NOTE — PROGRESS NOTES
Primary Cardiologist: Dr. Mcclain     Reason for Visit: Routine Follow Up    History of Present Illness:   This is a very pleasant 87-year-old gentleman with past medical history notable for paroxysmal flutter (rate control strategy, he is not on anticoagulation due to history of intracerebral bleed; he has declined watchman device in the past), severe aortic valve stenosis (his echocardiogram in 12/2018 demonstrated severe stenosis of his valve, he was referred to TAVR, it appears that patient and his family wanted to think about this option more), hypertension, history of syncopal episode in the past that was felt to be due to bradycardia (pacemaker implantation was recommended at this time but both patient and his family declined this), and history of chronic diastolic heart failure (maintained on Torsemide 10 mg daily).     He presents to clinic today with his son, stating he is overall doing well.  He does get shortness of breath occasionally but this is not persistent.  He denies orthopnea, PND, peripheral edema, chest discomfort, palpitations, or lightheadedness.  He will be seeing his primary care provider next month where he will get digoxin level and ECG done.     Assessment and Plan:   This is a very pleasant 87-year-old gentleman with past medical history notable for paroxysmal flutter (rate control strategy, he is not on anticoagulation due to history of intracerebral bleed; he has declined watchman device in the past), severe aortic valve stenosis (his echocardiogram in 12/2018 demonstrated severe stenosis of his valve, he was referred to TAVR, it appears that patient and his family wanted to think about this option more), hypertension, history of syncopal episode in the past that was felt to be due to bradycardia (pacemaker implantation was recommended at this time but both patient and his family declined this), and history of chronic diastolic heart failure (maintained on Torsemide 10 mg daily).     We  had a detailed discussion about TAVR. Both patient and his son are not interested in this and are aware of the risks and benefits. They would like to follow up with us regularly. He will be set up with Dr. Mcclain in 6 months.    I was planning to order digoxin level and do ECG but patient will be getting these during his PCP visit next month.     Thank you for allowing me to participate in the care of this pleasant patient today.    This note was completed in part using Dragon voice recognition software. Although reviewed after completion, some word and grammatical errors may occur.    Orders this Visit:  No orders of the defined types were placed in this encounter.    No orders of the defined types were placed in this encounter.    There are no discontinued medications.      Encounter Diagnosis   Name Primary?     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       DIGOX 125 MCG tablet TAKE 1 TABLET BY MOUTH DAILY 90 tablet 1     metoprolol succinate ER (TOPROL-XL) 25 MG 24 hr tablet TAKE 1/2 TABLET BY MOUTH DAILY 45 tablet 0     NAMENDA 10 MG tablet TAKE 1 TABLET BY MOUTH TWICE DAILY 180 tablet 0     OXYBUTYNIN CHLORIDE PO Take 5 mg by mouth every other day        simvastatin (ZOCOR) 20 MG tablet TAKE 1 TABLET BY MOUTH AT BEDTIME 90 tablet 1     tamsulosin (FLOMAX) 0.4 MG capsule TAKE 1 CAPSULE BY MOUTH EVERY DAY 90 capsule 0     torsemide (DEMADEX) 10 MG tablet Take 1 tablet (10 mg) by mouth daily 90 tablet 1       ALLERGIES     Allergies   Allergen Reactions     Fish Allergy      Coumadin        PAST MEDICAL HISTORY:  Past Medical History:   Diagnosis Date     Aortic valve disorders     moderate aortic stenosis & 1+ AR per 8/2014 echo     Atrial fibrillation, chronic (H)     Sees cardiologist for meds and testing such as digoxin med/levels      Atrial flutter  (H)      Cardiomyopathy (H)     resolved     CHF (congestive heart failure) (H) 10/26/2012     Dementia     chronic memory loss, son saima states he is poa     History of prostate cancer 10/26/2012     Hyperlipidemia LDL goal < 130      Hypertension      Intracranial bleed (H)     while on coumadin     LVH (left ventricular hypertrophy)     mild to moderate per 8/2014 echo     Memory loss, long term 10/26/2012    chronic memory loss, son saima states he is poa     Mitral regurgitation     1+ per 8/2014 echo     Sick sinus syndrome (H)      Unspecified cerebral artery occlusion with cerebral infarction      Urinary retention        PAST SURGICAL HISTORY:  Past Surgical History:   Procedure Laterality Date     GENITOURINARY SURGERY  2010    Resection of prostate     HEAD & NECK SURGERY  2009    Brain swelling - stopped coumadin but kept on aspirin per cardiology     wisdom teeth removal         FAMILY HISTORY:  Family History   Problem Relation Age of Onset     Cancer Mother        SOCIAL HISTORY:  Social History     Socioeconomic History     Marital status:      Spouse name: Not on file     Number of children: Not on file     Years of education: Not on file     Highest education level: Not on file   Occupational History     Employer: RETIRED     Comment: [employer]   Social Needs     Financial resource strain: Not on file     Food insecurity:     Worry: Not on file     Inability: Not on file     Transportation needs:     Medical: Not on file     Non-medical: Not on file   Tobacco Use     Smoking status: Former Smoker     Packs/day: 1.00     Years: 10.00     Pack years: 10.00     Types: Cigarettes     Smokeless tobacco: Never Used   Substance and Sexual Activity     Alcohol use: No     Drug use: No     Sexual activity: Not Currently   Lifestyle     Physical activity:     Days per week: Not on file     Minutes per session: Not on file     Stress: Not on file   Relationships     Social connections:      Talks on phone: Not on file     Gets together: Not on file     Attends Mosque service: Not on file     Active member of club or organization: Not on file     Attends meetings of clubs or organizations: Not on file     Relationship status: Not on file     Intimate partner violence:     Fear of current or ex partner: Not on file     Emotionally abused: Not on file     Physically abused: Not on file     Forced sexual activity: Not on file   Other Topics Concern     Parent/sibling w/ CABG, MI or angioplasty before 65F 55M? No      Service Not Asked     Blood Transfusions Not Asked     Caffeine Concern Not Asked     Occupational Exposure Not Asked     Hobby Hazards Not Asked     Sleep Concern Not Asked     Stress Concern Not Asked     Weight Concern Not Asked     Special Diet Not Asked     Back Care Not Asked     Exercise Yes     Comment: cycling      Bike Helmet Not Asked     Seat Belt Yes     Self-Exams Not Asked   Social History Narrative     Not on file       Review of Systems:  Skin:        Eyes:       ENT:       Respiratory:       Cardiovascular:       Gastroenterology:      Genitourinary:       Musculoskeletal:       Neurologic:       Psychiatric:       Heme/Lymph/Imm:       Endocrine:         Physical Exam:  Vitals: There were no vitals taken for this visit.     GEN:  NAD  NECK: No JVD  C/V:  Irregularly irregular rhythm. Normal rate. 2/6 LINUS at RUSB.  RESP: Clear to auscultation bilaterally without wheezing, rales, or rhonchi.  GI: Abdomen soft, nontender, nondistended.   EXTREM: Trace LE edema.   NEURO: Alert and oriented, cooperative. No obvious focal deficits.   PSYCH: Normal affect.  SKIN: Warm and dry.       Recent Lab Results:  LIPID RESULTS:  Lab Results   Component Value Date    CHOL 104 07/24/2018    HDL 35 (L) 07/24/2018    LDL 35 07/24/2018    TRIG 169 (H) 07/24/2018    CHOLHDLRATIO 3.3 10/30/2015       LIVER ENZYME RESULTS:  Lab Results   Component Value Date    AST 15 10/07/2017    ALT 14  07/24/2018       CBC RESULTS:  Lab Results   Component Value Date    WBC 5.1 10/07/2017    RBC 3.95 (L) 10/07/2017    HGB 11.8 (L) 07/24/2018    HCT 36.6 (L) 10/07/2017    MCV 93 10/07/2017    MCH 31.1 10/07/2017    MCHC 33.6 10/07/2017    RDW 13.4 10/07/2017     10/07/2017       BMP RESULTS:  Lab Results   Component Value Date     12/17/2018    POTASSIUM 3.9 12/17/2018    CHLORIDE 101 12/17/2018    CO2 30 (H) 12/17/2018    ANIONGAP 9.9 12/17/2018     (H) 12/17/2018    BUN 10 12/17/2018    CR 1.29 12/17/2018    GFRESTIMATED 53 (L) 12/17/2018    GFRESTBLACK 64 12/17/2018    TY 9.4 12/17/2018        A1C RESULTS:  Lab Results   Component Value Date    A1C 5.4 07/24/2018       INR RESULTS:  Lab Results   Component Value Date    INR 1.84 (H) 01/04/2010    INR 3.13 (H) 11/30/2009           Herberth Millard PA-C   May 31, 2019

## 2019-08-06 NOTE — TELEPHONE ENCOUNTER
"Requested Prescriptions   Pending Prescriptions Disp Refills     digoxin (LANOXIN) 125 MCG tablet [Pharmacy Med Name: DIGOXIN 0.125MG TABLETS (YELLOW)]  Last Written Prescription Date:  1/31/2019  Last Fill Quantity: 90 tablet,  # refills: 1   Last office visit: 7/24/2018 with prescribing provider:  BRENTON Wagoner   Future Office Visit:     90 tablet 0     Sig: TAKE 1 TABLET BY MOUTH DAILY       Cardiac Glycoside Agents Protocol Failed - 8/5/2019  8:11 PM        Failed - Normal digoxin level on file in past 12 mos     Recent Labs   Lab Test 07/24/18  1241   DIGOXIN 1.4             Failed - Recent (6 mo) or future (30 days) visit within the authorizing provider's specialty     Patient had office visit in the last 6 months or has a visit in the next 30 days with authorizing provider or within the authorizing provider's specialty.  See \"Patient Info\" tab in inbasket, or \"Choose Columns\" in Meds & Orders section of the refill encounter.            Passed - Medication is active on med list        Passed - Patient is 18 years of age or older        Passed - Normal serum creatinine on file in past 12 mos     Recent Labs   Lab Test 12/17/18  1311   CR 1.29                "

## 2019-08-06 NOTE — TELEPHONE ENCOUNTER
Patient's number was called. Message left for Bj rodriguez's son to call clinic to schedule an OV.     Routing refill request to provider for review/approval because:  Labs not current:  Digoxin level   Patient was not seen by primary for more than 1 year  Seen by Cardiology on 5/31/19

## 2019-08-19 NOTE — PROGRESS NOTES
Subjective     Sylvester Davis is a 87 year old male who presents to clinic today for the following health issues:    HPI   swelling      Duration: 6 days    Description (location/character/radiation): feet and ankles    Intensity:  moderate    Accompanying signs and symptoms: none    History (similar episodes/previous evaluation): yes in past    Precipitating or alleviating factors: elevation    Therapies tried and outcome: None     Hyperlipidemia Follow-Up      Are you having any of the following symptoms? (Select all that apply)  No complaints of shortness of breath, chest pain or pressure.  No increased sweating or nausea with activity.  No left-sided neck or arm pain.  No complaints of pain in calves when walking 1-2 blocks.    Are you regularly taking any medication or supplement to lower your cholesterol?   Yes- statin    Are you having muscle aches or other side effects that you think could be caused by your cholesterol lowering medication?  No      Hypertension Follow-up      Do you check your blood pressure regularly outside of the clinic? No     Are you following a low salt diet? Yes    Are your blood pressures ever more than 140 on the top number (systolic) OR more   than 90 on the bottom number (diastolic), for example 140/90? No    Patient Active Problem List   Diagnosis     History of prostate cancer     Short-term memory loss     Atrial fibrillation (H)     Dementia     Mixed hyperlipidemia     Anemia     Cerebral artery occlusion with cerebral infarction (H)     Aortic valve disorder     Mitral regurgitation     LVH (left ventricular hypertrophy)     Intracranial bleed (H)     Sick sinus syndrome (H)     Atrial flutter (H)     Cardiomyopathy (H)     Essential hypertension     CKD (chronic kidney disease) stage 3, GFR 30-59 ml/min (H)     Chronic systolic heart failure (H)     Anemia in chronic kidney disease, unspecified CKD stage     Alcohol dependence (H)     Diarrhea     Dizziness and  giddiness     Dysthymic disorder     Edema     Elevated prostate specific antigen (PSA)     Hyposmolality     Malignant neoplasm of prostate (H)     Other specified transient cerebral ischemias     Other stiff joint, of the upper arm     Other tenosynovitis of hand and wrist     Primary localized osteoarthrosis, lower leg     Primary localized osteoarthrosis, upper arm     Primary open angle glaucoma     Pseudophakia, left eye     Pseudophakia, right eye     Senile dementia with delirium     Trigger finger, acquired     Type II diabetes mellitus, uncontrolled (H)     Urinary frequency     Vascular dementia, uncomplicated     Chronic atrial fibrillation (H)     Type 2 diabetes mellitus with diabetic nephropathy, without long-term current use of insulin (H)     Aortic valve stenosis, etiology of cardiac valve disease unspecified     Overweight (BMI 25.0-29.9)     Alcohol abuse-resolved post CVA in 2010     Past Surgical History:   Procedure Laterality Date     GENITOURINARY SURGERY  2010    Resection of prostate     HEAD & NECK SURGERY  2009    Brain swelling - stopped coumadin but kept on aspirin per cardiology     wisdom teeth removal         Social History     Tobacco Use     Smoking status: Former Smoker     Packs/day: 1.00     Years: 10.00     Pack years: 10.00     Types: Cigarettes     Smokeless tobacco: Never Used   Substance Use Topics     Alcohol use: No     Family History   Problem Relation Age of Onset     Cancer Mother            Reviewed and updated as needed this visit by Provider         Review of Systems   ROS COMP: CONSTITUTIONAL: NEGATIVE for fever, chills, change in weight  INTEGUMENTARY/SKIN: NEGATIVE for worrisome rashes, moles or lesions  EYES: NEGATIVE for vision changes or irritation  ENT/MOUTH: NEGATIVE for ear, mouth and throat problems  RESP: NEGATIVE for significant cough or SOB  CV: POSITIVE for lower extremity edema  GI: NEGATIVE for nausea, abdominal pain, heartburn, or change in bowel  "habits  : NEGATIVE for frequency, dysuria, or hematuria  MUSCULOSKELETAL: NEGATIVE for significant arthralgias or myalgia  NEURO: NEGATIVE for weakness, dizziness or paresthesias  ENDOCRINE: NEGATIVE for temperature intolerance, skin/hair changes  HEME: NEGATIVE for bleeding problems  PSYCHIATRIC: NEGATIVE for changes in mood or affect      Objective    BP (!) 150/70 (Cuff Size: Adult Large)   Pulse (!) 43   Temp 97.2  F (36.2  C) (Tympanic)   Wt 80.3 kg (177 lb)   SpO2 99%   BMI 26.91 kg/m    Body mass index is 26.91 kg/m .  Physical Exam   GENERAL APPEARANCE: healthy, alert and no distress  RESP: lungs clear to auscultation - no rales, rhonchi or wheezes  CV: pitting B/L LE edema to just above the ankles  MS: Patient walks extremely slowly and with a cane for assistance even with the assistance of his son.            Assessment & Plan       ICD-10-CM    1. Type 2 diabetes mellitus with diabetic nephropathy, without long-term current use of insulin (H) E11.21 Lipid Profile     Albumin Random Urine Quantitative with Creat Ratio     Hemoglobin A1c     TSH     losartan (COZAAR) 50 MG tablet   2. Essential hypertension I10 metoprolol succinate ER (TOPROL-XL) 25 MG 24 hr tablet     UA with Microscopic     CBC with platelets differential     Comprehensive metabolic panel     EKG 12-lead complete w/read - Clinics     losartan (COZAAR) 50 MG tablet   3. Aortic valve stenosis, etiology of cardiac valve disease unspecified I35.0    4. CKD (chronic kidney disease) stage 3, GFR 30-59 ml/min (H) N18.3 Digoxin level   5. Alzheimer's dementia without behavioral disturbance, unspecified timing of dementia onset G30.9     F02.80         BMI:   Estimated body mass index is 26.91 kg/m  as calculated from the following:    Height as of 5/31/19: 1.727 m (5' 8\").    Weight as of this encounter: 80.3 kg (177 lb).           Patient Instructions   Will check tests and see back in 6 months, sooner if test results dictate,      Return " in about 6 months (around 2/19/2020) for hypertension, dyslipidemia, memory issues.    Manuelito Ansari MD  [facility]

## 2019-08-19 NOTE — LETTER
"August 20, 2019      Sylvester Davis  [address]  [place] [address]        Dear ,    We are writing to inform you of your test results.    This is all pretty stable.  We can repeat these in 6 months.  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   Kidney function is otherwise stable.  I do think we should add a medication once a day to see if we can help with the macroalbuminemia.  I will send a new prescription to your pharmacy.  We should repeat the tests again in 3 months and I should see you at that time.    Resulted Orders   UA with Microscopic   Result Value Ref Range    Color Urine Yellow     Appearance Urine Clear     Glucose Urine Negative NEG^Negative mg/dL    Bilirubin Urine Negative NEG^Negative    Ketones Urine Negative NEG^Negative mg/dL    Specific Gravity Urine 1.025 1.003 - 1.035    pH Urine 5.5 5.0 - 7.0 pH    Protein Albumin Urine >=300 (A) NEG^Negative mg/dL    Urobilinogen Urine 1.0 0.2 - 1.0 EU/dL    Nitrite Urine Negative NEG^Negative    Blood Urine Trace (A) NEG^Negative    Leukocyte Esterase Urine Negative NEG^Negative    Source Midstream Urine     WBC Urine 0 - 5 OTO5^0 - 5 /HPF    RBC Urine O - 2 OTO2^O - 2 /HPF    Hyaline Casts 2-5 (A) OTO2^O - 2 /LPF    Squamous Epithelial /LPF Urine Few FEW^Few /LPF   CBC with platelets differential   Result Value Ref Range    WBC 6.1 4.0 - 11.0 10e9/L    RBC Count 3.71 (L) 4.4 - 5.9 10e12/L    Hemoglobin 11.1 (L) 13.3 - 17.7 g/dL    Hematocrit 34.0 (L) 40.0 - 53.0 %    MCV 92 78 - 100 fl    MCH 29.9 26.5 - 33.0 pg    MCHC 32.6 31.5 - 36.5 g/dL    RDW 15.1 (H) 10.0 - 15.0 %    Platelet Count 208 150 - 450 10e9/L    % Neutrophils 65.7 %    % Lymphocytes 19.4 %    % Monocytes 12.2 %    % Eosinophils 2.4 %    % Basophils 0.3 %    Absolute Neutrophil 4.0 1.6 - 8.3 10e9/L    Absolute Lymphocytes 1.2 0.8 - 5.3 10e9/L    Absolute Monocytes 0.8 0.0 - 1.3 10e9/L    Absolute Eosinophils 0.2 0.0 - 0.7 10e9/L "    Absolute Basophils 0.0 0.0 - 0.2 10e9/L    Diff Method Automated Method    Comprehensive metabolic panel   Result Value Ref Range    Sodium 140 133 - 144 mmol/L    Potassium 4.1 3.4 - 5.3 mmol/L    Chloride 109 94 - 109 mmol/L    Carbon Dioxide 23 20 - 32 mmol/L    Anion Gap 8 3 - 14 mmol/L    Glucose 120 (H) 70 - 99 mg/dL    Urea Nitrogen 22 7 - 30 mg/dL    Creatinine 1.26 (H) 0.66 - 1.25 mg/dL    GFR Estimate 51 (L) >60 mL/min/[1.73_m2]      Comment:      Non  GFR Calc  Starting 12/18/2018, serum creatinine based estimated GFR (eGFR) will be   calculated using the Chronic Kidney Disease Epidemiology Collaboration   (CKD-EPI) equation.      GFR Estimate If Black 59 (L) >60 mL/min/[1.73_m2]      Comment:       GFR Calc  Starting 12/18/2018, serum creatinine based estimated GFR (eGFR) will be   calculated using the Chronic Kidney Disease Epidemiology Collaboration   (CKD-EPI) equation.      Calcium 8.5 8.5 - 10.1 mg/dL    Bilirubin Total 1.9 (H) 0.2 - 1.3 mg/dL    Albumin 3.6 3.4 - 5.0 g/dL    Protein Total 7.5 6.8 - 8.8 g/dL    Alkaline Phosphatase 93 40 - 150 U/L    ALT 15 0 - 70 U/L    AST 21 0 - 45 U/L   Lipid Profile   Result Value Ref Range    Cholesterol 79 <200 mg/dL    Triglycerides 116 <150 mg/dL    HDL Cholesterol 27 (L) >39 mg/dL    LDL Cholesterol Calculated 29 <100 mg/dL      Comment:      Desirable:       <100 mg/dl    Non HDL Cholesterol 52 <130 mg/dL   Digoxin level   Result Value Ref Range    Digoxin Level 1.6 0.5 - 2.0 ug/L   Albumin Random Urine Quantitative with Creat Ratio   Result Value Ref Range    Creatinine Urine 167 mg/dL    Albumin Urine mg/L 2,220 mg/L    Albumin Urine mg/g Cr 1,329.34 (H) 0 - 17 mg/g Cr   Hemoglobin A1c   Result Value Ref Range    Hemoglobin A1C 7.6 (H) 0 - 5.6 %      Comment:      Normal <5.7% Prediabetes 5.7-6.4%  Diabetes 6.5% or higher - adopted from ADA   consensus guidelines.     TSH   Result Value Ref Range    TSH 3.03 0.40 -  4.00 mU/L       If you have any questions or concerns, please call the clinic at the number listed above.       Sincerely,        Manuelito Ansari MD

## 2019-08-22 NOTE — TELEPHONE ENCOUNTER
Pt's son Bj was called with lab results and reviewed providers comments. Advised pt have lab recheck  in 3 months at next diabetes OV. Son states pt does not have diabetes. He is requesting clarification on DM diagnosis. Please advice if future OV needed to discuss labs or lab orders could be placed.

## 2019-08-22 NOTE — PROGRESS NOTES
"SUBJECTIVE:   Chief Complaint   Patient presents with     Urgent Care     lower stomach and back pain that started this evening.       Urgent Care     swelling in feets for one week.      Urgent Care     heavy breathing with movements on/off for two weeks.         This is a 87 year old male with a past medical history significant for dementia and HFrEF who presented to the clinic with stomach and back pain. History provided by son. His son reported that he was complaining of left lower back pain and stomach pain this evening. He also reports bilateral lower leg swelling. He was seen by his PCP and was told that swelling is likely secondary to his heart failure. Patient denies any chest pain and shortness of breath. When asked if he feels week he states \"I don't know\". Son denies any changes in behaviors from baseline.       Review of Systems review of system negative except as mentioned in HPI.       Past Medical History:   Diagnosis Date     Aortic valve disorders     moderate aortic stenosis & 1+ AR per 8/2014 echo     Atrial fibrillation, chronic (H)     Sees cardiologist for meds and testing such as digoxin med/levels      Atrial flutter (H)      Cardiomyopathy (H)     resolved     CHF (congestive heart failure) (H) 10/26/2012     Dementia     chronic memory loss, son saima states he is poa     History of prostate cancer 10/26/2012     Hyperlipidemia LDL goal < 130      Hypertension      Intracranial bleed (H)     while on coumadin     LVH (left ventricular hypertrophy)     mild to moderate per 8/2014 echo     Memory loss, long term 10/26/2012    chronic memory loss, son saima states he is poa     Mitral regurgitation     1+ per 8/2014 echo     Sick sinus syndrome (H)      Unspecified cerebral artery occlusion with cerebral infarction      Urinary retention      Family History   Problem Relation Age of Onset     Cancer Mother      Current Outpatient Medications   Medication Sig Dispense Refill     aspirin 81 MG " tablet Take 81 mg by mouth daily.       digoxin (LANOXIN) 125 MCG tablet TAKE 1 TABLET BY MOUTH DAILY 90 tablet 0     losartan (COZAAR) 50 MG tablet Take 1 tablet (50 mg) by mouth daily 30 tablet 3     memantine (NAMENDA) 5 (28)-10 (21) MG tablet Take 10 mg twice daily 180 tablet 3     metoprolol succinate ER (TOPROL-XL) 25 MG 24 hr tablet TAKE 1/2 TABLET BY MOUTH DAILY 45 tablet 3     oxybutynin (DITROPAN) 5 MG tablet TAKE 1 TABLET BY MOUTH TWICE DAILY 180 tablet 0     simvastatin (ZOCOR) 20 MG tablet TAKE 1 TABLET BY MOUTH AT BEDTIME 90 tablet 1     tamsulosin (FLOMAX) 0.4 MG capsule TAKE 1 CAPSULE BY MOUTH EVERY DAY 90 capsule 0     torsemide (DEMADEX) 10 MG tablet Take 1 tablet (10 mg) by mouth daily 90 tablet 1     Social History     Tobacco Use     Smoking status: Former Smoker     Packs/day: 1.00     Years: 10.00     Pack years: 10.00     Types: Cigarettes     Smokeless tobacco: Never Used   Substance Use Topics     Alcohol use: No       OBJECTIVE  BP (!) 201/86   Pulse 59   Temp 97.3  F (36.3  C) (Oral)   Resp 12   Wt 79.4 kg (175 lb)   SpO2 98%   BMI 26.61 kg/m      Physical Exam   Constitutional: He appears well-developed and well-nourished. No distress.   Ambulates with a walker. Slow giat   HENT:   Head: Normocephalic and atraumatic.   Eyes: Conjunctivae are normal.   Cardiovascular:   LINUS loudest on the second right intercostal space   Pulmonary/Chest: Effort normal and breath sounds normal. No stridor. No respiratory distress. He has no wheezes. He has no rales. He exhibits no tenderness.   Abdominal: He exhibits no distension (Firm ) and no mass. There is no tenderness. There is no guarding.   Musculoskeletal: He exhibits edema (1+ bilaterally).        Cervical back: Normal.        Thoracic back: Normal.        Lumbar back: Normal.   Neurological:   Demented    Skin: He is not diaphoretic.       Labs:  No results found for this or any previous visit (from the past 24 hour(s)).    X-Ray was done,  my findings are: Moderate stool. No evidence of obstruction     ASSESSMENT:    Sylvester was seen today for urgent care, urgent care and urgent care.    Diagnoses and all orders for this visit:    Patient presented to the clinic with non-specific back and stomach pain. His blood pressures was significantly elevated at 201/86. Repeated blood pressures improved at 186/80. Son reported that he missed some of his evening medications. His exam revealed firm abdomen otherwise unremarkable. Abdominal imaging consistent with moderate stool otherwise negative for findings suggestive of obstruction. EKG revealed khoa cardia and right ventricular hypertrophy (Similar to previous EKGs). Reassured patient's son and recommended tylenol as needed for back pain. Blood pressure was repeated again prior to leaving clinic (180/82). They will follow up with PCP.     Acute right-sided low back pain without sciatica  -     XR Lumbar Spine 2/3 Views    Stomach pain  -     XR Abdomen 2 Views    Malignant essential hypertension  -     EKG 12-lead complete w/read - Clinics      Eduardo Guerra MD

## 2019-08-22 NOTE — TELEPHONE ENCOUNTER
Reason for Call:  Request for results:    Name of test or procedure: lab results     Date of test of procedure: 8/19/2019    Location of the test or procedure: [facility]     OK to leave the result message on voice mail or with a family member? YES    Phone number Patient can be reached at:   Sylvester Lorenzo's son, is requesting a return call at   [phone]    Additional comments: n/a    Call taken on 8/22/2019 at 1:41 PM by Yessy Tomas

## 2019-08-27 NOTE — PATIENT INSTRUCTIONS
We will see the patient back in 3 months.  At that point he will need his kidney function checked and his diabetes tests done.  I had a discussion with the patient's son about not starting medication for diabetes at this point in time.  His hemoglobin A1c is actually pretty good and he is currently on no medication for his diabetes.  They will watch his diet a little closer than they have been in the past.  15 minutes was spent with the patient and his son going over the diagnosis of type 2 diabetes.

## 2019-08-27 NOTE — PROGRESS NOTES
Subjective     Sylvester Davis is a 87 year old male who presents to clinic today for the following health issues:    HPI   Results      Duration: 8/19/2019    Description (location/character/radiation): pt is here to discuss lab results, specifically diabetes diagnosis    Intensity:  moderate    Accompanying signs and symptoms: none    History (similar episodes/previous evaluation): None    Precipitating or alleviating factors: None    Therapies tried and outcome: None         Patient Active Problem List   Diagnosis     History of prostate cancer     Short-term memory loss     Atrial fibrillation (H)     Dementia     Mixed hyperlipidemia     Anemia     Cerebral artery occlusion with cerebral infarction (H)     Aortic valve disorder     Mitral regurgitation     LVH (left ventricular hypertrophy)     Intracranial bleed (H)     Sick sinus syndrome (H)     Atrial flutter (H)     Cardiomyopathy (H)     Essential hypertension     CKD (chronic kidney disease) stage 3, GFR 30-59 ml/min (H)     Chronic systolic heart failure (H)     Anemia in chronic kidney disease, unspecified CKD stage     Alcohol dependence (H)     Diarrhea     Dizziness and giddiness     Dysthymic disorder     Edema     Elevated prostate specific antigen (PSA)     Hyposmolality     Malignant neoplasm of prostate (H)     Other specified transient cerebral ischemias     Other stiff joint, of the upper arm     Other tenosynovitis of hand and wrist     Primary localized osteoarthrosis, lower leg     Primary localized osteoarthrosis, upper arm     Primary open angle glaucoma     Pseudophakia, left eye     Pseudophakia, right eye     Senile dementia with delirium     Trigger finger, acquired     Type II diabetes mellitus, uncontrolled (H)     Urinary frequency     Vascular dementia, uncomplicated     Chronic atrial fibrillation (H)     Type 2 diabetes mellitus with diabetic nephropathy, without long-term current use of insulin (H)     Aortic valve  "stenosis, etiology of cardiac valve disease unspecified     Overweight (BMI 25.0-29.9)     Alcohol abuse-resolved post CVA in 2010     Past Surgical History:   Procedure Laterality Date     GENITOURINARY SURGERY  2010    Resection of prostate     HEAD & NECK SURGERY  2009    Brain swelling - stopped coumadin but kept on aspirin per cardiology     wisdom teeth removal         Social History     Tobacco Use     Smoking status: Former Smoker     Packs/day: 1.00     Years: 10.00     Pack years: 10.00     Types: Cigarettes     Smokeless tobacco: Never Used   Substance Use Topics     Alcohol use: No     Family History   Problem Relation Age of Onset     Cancer Mother              Reviewed and updated as needed this visit by Provider         Review of Systems   ROS COMP: Constitutional, HEENT, cardiovascular, pulmonary, gi and gu systems are negative, except as otherwise noted.      Objective    /84   Pulse 54   Resp 18   Wt 78 kg (172 lb)   SpO2 98%   BMI 26.15 kg/m    Body mass index is 26.15 kg/m .  Physical Exam   GENERAL APPEARANCE: healthy, alert and no distress            Assessment & Plan       ICD-10-CM    1. Type 2 diabetes mellitus with diabetic nephropathy, without long-term current use of insulin (H) E11.21    2. Essential hypertension I10    3. CKD (chronic kidney disease) stage 3, GFR 30-59 ml/min (H) N18.3    4. Alzheimer's dementia without behavioral disturbance, unspecified timing of dementia onset G30.9     F02.80         BMI:   Estimated body mass index is 26.15 kg/m  as calculated from the following:    Height as of 5/31/19: 1.727 m (5' 8\").    Weight as of this encounter: 78 kg (172 lb).           Patient Instructions   We will see the patient back in 3 months.  At that point he will need his kidney function checked and his diabetes tests done.  I had a discussion with the patient's son about not starting medication for diabetes at this point in time.  His hemoglobin A1c is actually pretty " good and he is currently on no medication for his diabetes.  They will watch his diet a little closer than they have been in the past.  15 minutes was spent with the patient and his son going over the diagnosis of type 2 diabetes.      Return in about 3 months (around 11/27/2019) for diabetes, ckd.    Manuelito Ansari MD  [facility]

## 2019-09-27 NOTE — TELEPHONE ENCOUNTER
Call from patient's son Bj. He states patient has been struggling with peripheral edema off and on since summer. He states patient's YOU is still noticeable with climbing steps or bending over but is not severe. Patient's weight is stable. Son would like to have patient seen in the next month or so as his PCP suggested staying in touch with cardiology before next spring. Offered patient's son KAREN visit and after much negotiation he chose 11/8/19 with KAREN Herberth Millard. Connected son with scheduling to finalize the appointment.

## 2019-10-14 NOTE — LETTER
October 18, 2019      Sylvester Davis  [address]  [place] [address]        Dear ,    We are writing to inform you of your test results.    This is all very stable and can be repeated in 6 months.    Resulted Orders   CBC with platelets differential   Result Value Ref Range    WBC 6.8 4.0 - 11.0 10e9/L    RBC Count 4.03 (L) 4.4 - 5.9 10e12/L    Hemoglobin 11.6 (L) 13.3 - 17.7 g/dL    Hematocrit 36.0 (L) 40.0 - 53.0 %    MCV 89 78 - 100 fl    MCH 28.8 26.5 - 33.0 pg    MCHC 32.2 31.5 - 36.5 g/dL    RDW 14.2 10.0 - 15.0 %    Platelet Count 190 150 - 450 10e9/L    % Neutrophils 68.2 %    % Lymphocytes 17.7 %    % Monocytes 11.4 %    % Eosinophils 2.7 %    % Basophils 0.0 %    Absolute Neutrophil 4.6 1.6 - 8.3 10e9/L    Absolute Lymphocytes 1.2 0.8 - 5.3 10e9/L    Absolute Monocytes 0.8 0.0 - 1.3 10e9/L    Absolute Eosinophils 0.2 0.0 - 0.7 10e9/L    Absolute Basophils 0.0 0.0 - 0.2 10e9/L    Diff Method Automated Method    Lipid Profile   Result Value Ref Range    Cholesterol 119 <200 mg/dL    Triglycerides 166 (H) <150 mg/dL      Comment:      Borderline high:  150-199 mg/dl  High:             200-499 mg/dl  Very high:       >499 mg/dl      HDL Cholesterol 36 (L) >39 mg/dL    LDL Cholesterol Calculated 50 <100 mg/dL      Comment:      Desirable:       <100 mg/dl    Non HDL Cholesterol 83 <130 mg/dL   Albumin Random Urine Quantitative with Creat Ratio   Result Value Ref Range    Creatinine Urine 138 mg/dL    Albumin Urine mg/L 306 mg/L    Albumin Urine mg/g Cr 221.74 (H) 0 - 17 mg/g Cr   Hemoglobin A1c   Result Value Ref Range    Hemoglobin A1C 5.3 0 - 5.6 %      Comment:      Normal <5.7% Prediabetes 5.7-6.4%  Diabetes 6.5% or higher - adopted from ADA   consensus guidelines.     TSH   Result Value Ref Range    TSH 2.42 0.40 - 4.00 mU/L       If you have any questions or concerns, please call the clinic at the number listed above.       Sincerely,        Manuelito Ansari,  MD

## 2019-10-14 NOTE — TELEPHONE ENCOUNTER
"Requested Prescriptions   Pending Prescriptions Disp Refills     simvastatin (ZOCOR) 20 MG tablet [Pharmacy Med Name: SIMVASTATIN 20MG TABLETS]    Last Written Prescription Date:  02/25/2019  Last Fill Quantity: 90 tablet,  # refills: 1   Last office visit: 8/27/2019 with prescribing provider:  Coty   Future Office Visit:     Next 5 appointments (look out 90 days)    Oct 14, 2019  2:15 PM CDT  [facility] Manuelito [facility]ari MD  [facility] [address]  [place] [place] [facility] [address]  [phone]   Nov 08, 2019  3:50 PM CST  Return Visit with Herberth Millard PA-C  [facility] ([facility]) [address]  [facility] [id number] [phone] OPT 2          90 tablet 0     Sig: TAKE 1 TABLET BY MOUTH AT BEDTIME       Statins Protocol Passed - 10/13/2019  4:16 PM        Passed - LDL on file in past 12 months     Recent Labs   Lab Test 08/19/19  1441   LDL 29             Passed - No abnormal creatine kinase in past 12 months     No lab results found.             Passed - Recent (12 mo) or future (30 days) visit within the authorizing provider's specialty     Patient has had an office visit with the authorizing provider or a provider within the authorizing providers department within the previous 12 mos or has a future within next 30 days. See \"Patient Info\" tab in inbasket, or \"Choose Columns\" in Meds & Orders section of the refill encounter.              Passed - Medication is active on med list        Passed - Patient is age 18 or older           "

## 2019-10-14 NOTE — PROGRESS NOTES
Subjective     Sylvester Davis is a 87 year old male who presents to clinic today for the following health issues:    HPI   Diabetes Follow-up      How often are you checking your blood sugar? Not at all    What time of day are you checking your blood sugars (select all that apply)?Does not check blood sugars    Have you had any blood sugars above 200?  No--NA    Have you had any blood sugars below 70?  No--NA    What symptoms do you notice when your blood sugar is low?  None    What concerns do you have today about your diabetes? None     Do you have any of these symptoms? (Select all that apply)  No numbness or tingling in feet.  No redness, sores or blisters on feet.  No complaints of excessive thirst.  No reports of blurry vision.  No significant changes to weight.     Have you had a diabetic eye exam in the last 12 months? No    Diabetes Management Resources    Hyperlipidemia Follow-Up      Are you having any of the following symptoms? (Select all that apply)  No complaints of shortness of breath, chest pain or pressure.  No increased sweating or nausea with activity.  No left-sided neck or arm pain.  No complaints of pain in calves when walking 1-2 blocks.    Are you regularly taking any medication or supplement to lower your cholesterol?       Are you having muscle aches or other side effects that you think could be caused by your cholesterol lowering medication?  No    Hypertension Follow-up      Do you check your blood pressure regularly outside of the clinic? No     Are you following a low salt diet? No    Are your blood pressures ever more than 140 on the top number (systolic) OR more   than 90 on the bottom number (diastolic), for example 140/90? No--Does not check    BP Readings from Last 2 Encounters:   10/14/19 138/60   08/27/19 138/84     Hemoglobin A1C (%)   Date Value   10/14/2019 5.3   08/19/2019 7.6 (H)     LDL Cholesterol Calculated (mg/dL)   Date Value   08/19/2019 29   07/24/2018 35      Chronic Kidney Disease Follow-up      Current NSAID use?  No        How many servings of fruits and vegetables do you eat daily?  2-3    On average, how many sweetened beverages do you drink each day (soda, juice, sweet tea, etc)?   1    How many days per week do you miss taking your medication? 0            Patient Active Problem List   Diagnosis     History of prostate cancer     Short-term memory loss     Atrial fibrillation (H)     Dementia (H)     Mixed hyperlipidemia     Anemia     Cerebral artery occlusion with cerebral infarction (H)     Aortic valve disorder     Mitral regurgitation     LVH (left ventricular hypertrophy)     Intracranial bleed (H)     Sick sinus syndrome (H)     Atrial flutter (H)     Cardiomyopathy (H)     Essential hypertension     CKD (chronic kidney disease) stage 3, GFR 30-59 ml/min (H)     Chronic systolic heart failure (H)     Anemia in chronic kidney disease, unspecified CKD stage     Alcohol dependence (H)     Diarrhea     Dizziness and giddiness     Dysthymic disorder     Edema     Elevated prostate specific antigen (PSA)     Hyposmolality     Malignant neoplasm of prostate (H)     Other specified transient cerebral ischemias     Other stiff joint, of the upper arm     Other tenosynovitis of hand and wrist     Primary localized osteoarthrosis, lower leg     Primary localized osteoarthrosis, upper arm     Primary open angle glaucoma     Pseudophakia, left eye     Pseudophakia, right eye     Senile dementia with delirium (H)     Trigger finger, acquired     Type II diabetes mellitus, uncontrolled (H)     Urinary frequency     Vascular dementia, uncomplicated (H)     Chronic atrial fibrillation     Type 2 diabetes mellitus with diabetic nephropathy, without long-term current use of insulin (H)     Aortic valve stenosis, etiology of cardiac valve disease unspecified     Overweight (BMI 25.0-29.9)     Alcohol abuse-resolved post CVA in 2010     Past Surgical History:   Procedure  "Laterality Date     GENITOURINARY SURGERY  2010    Resection of prostate     HEAD & NECK SURGERY  2009    Brain swelling - stopped coumadin but kept on aspirin per cardiology     wisdom teeth removal         Social History     Tobacco Use     Smoking status: Former Smoker     Packs/day: 1.00     Years: 10.00     Pack years: 10.00     Types: Cigarettes     Smokeless tobacco: Never Used   Substance Use Topics     Alcohol use: No     Family History   Problem Relation Age of Onset     Cancer Mother              Reviewed and updated as needed this visit by Provider         Review of Systems   ROS COMP: Constitutional, HEENT, cardiovascular, pulmonary, gi and gu systems are negative, except as otherwise noted.      Objective    /60 (Patient Position: Sitting, Cuff Size: Adult Regular)   Pulse 69   Temp 97.9  F (36.6  C) (Tympanic)   Resp 14   Ht 1.727 m (5' 8\")   Wt 77.6 kg (171 lb)   SpO2 100%   BMI 26.00 kg/m    Body mass index is 26 kg/m .  Physical Exam   GENERAL APPEARANCE: healthy, alert and no distress            Assessment & Plan       ICD-10-CM    1. Type 2 diabetes mellitus with diabetic nephropathy, without long-term current use of insulin (H) E11.21 Albumin Random Urine Quantitative with Creat Ratio     Hemoglobin A1c     TSH   2. CKD (chronic kidney disease) stage 3, GFR 30-59 ml/min (H) N18.3    3. Essential hypertension I10    4. Mixed hyperlipidemia E78.2 Lipid Profile   5. Anemia due to chronic kidney disease, unspecified CKD stage N18.9 CBC with platelets differential    D63.1    6. History of prostate cancer Z85.46 tamsulosin (FLOMAX) 0.4 MG capsule     oxybutynin (DITROPAN) 5 MG tablet   7. Chronic systolic heart failure (H) I50.22 digoxin (LANOXIN) 125 MCG tablet        BMI:   Estimated body mass index is 26 kg/m  as calculated from the following:    Height as of this encounter: 1.727 m (5' 8\").    Weight as of this encounter: 77.6 kg (171 lb).           Patient Instructions   Will see " back pending review of tests. Will mail results.  The patient is not fasting today and will get test done anyway due to the fact that his memory issues make it difficult to get him to come into the office.  I refilled his 3 medications as noted.  Further plan will depend review of the test results.  He is currently not having any behavioral issues at home despite his memory issues.  He continues to live with his son Bj.      No follow-ups on file.    Manuelito Ansari MD  [facility]

## 2019-10-14 NOTE — PATIENT INSTRUCTIONS
Will see back pending review of tests. Will mail results.  The patient is not fasting today and will get test done anyway due to the fact that his memory issues make it difficult to get him to come into the office.  I refilled his 3 medications as noted.  Further plan will depend review of the test results.  He is currently not having any behavioral issues at home despite his memory issues.  He continues to live with his son Bj.

## 2019-10-15 NOTE — TELEPHONE ENCOUNTER
"Requested Prescriptions   Pending Prescriptions Disp Refills     simvastatin (ZOCOR) 20 MG tablet [Pharmacy Med Name: SIMVASTATIN 20MG  Last Written Prescription Date:  2/25/2019  Last Fill Quantity: 90,  # refills: 1   Last office visit: 10/14/2019 with prescribing provider:  Dr Ansari   Future Office Visit:   Next 5 appointments (look out 90 days)    Nov 08, 2019  3:50 PM CST  Return Visit with Herberth Millard PA-C  [facility] ([facility]) [address] [place] [id number] [phone] OPT 2          TABLETS] 90 tablet 0     Sig: TAKE 1 TABLET BY MOUTH AT BEDTIME       Statins Protocol Passed - 10/14/2019  4:13 PM        Passed - LDL on file in past 12 months     Recent Labs   Lab Test 08/19/19  1441   LDL 29             Passed - No abnormal creatine kinase in past 12 months     No lab results found.             Passed - Recent (12 mo) or future (30 days) visit within the authorizing provider's specialty     Patient has had an office visit with the authorizing provider or a provider within the authorizing providers department within the previous 12 mos or has a future within next 30 days. See \"Patient Info\" tab in inbasket, or \"Choose Columns\" in Meds & Orders section of the refill encounter.              Passed - Medication is active on med list        Passed - Patient is age 18 or older          "

## 2019-11-08 NOTE — LETTER
11/8/2019    Manuelito Ansari MD  [address]  [facility] [address]    RE: Sylvester Davis       Dear Colleague,    I had the pleasure of seeing Sylvester Davis in the [facility].    Primary Cardiologist: Dr. Mcclain    Reason for Visit: Routine follow up    History of Present Illness:   This is a very pleasant 87-year-old gentleman with past medical history notable for paroxysmal flutter (rate control strategy, he is not on anticoagulation due to history of intracerebral bleed; he has declined watchman device in the past), severe aortic valve stenosis (his echocardiogram in 12/2018 demonstrated severe stenosis of his valve, he was referred to TAVR but both patient and family declined this), hypertension, history of syncopal episode in the past that was felt to be due to bradycardia (pacemaker implantation was recommended at this time but both patient and his family declined this), and history of chronic diastolic heart failure (maintained on Torsemide 10 mg daily).     He returns to clinic today with his son, stating he is overall doing about the same.  He denies any significant shortness of breath, peripheral edema, orthopnea, or PND.  His son reports that several weeks ago he had some peripheral edema but it resolved on its own.  They wanted to come in to be seen in cardiology clinic for routine check.  He denies any chest discomfort or bleeding issues.  They are still not interested in TAVR as they prefer conservative management.     Assessment and Plan:   This is a very pleasant 87-year-old gentleman with past medical history notable for paroxysmal flutter (rate control strategy, he is not on anticoagulation due to history of intracerebral bleed; he has declined watchman device in the past), severe aortic valve stenosis (his echocardiogram in 12/2018 demonstrated severe stenosis of his valve, he was referred to TAVR but both patient and family declined  this), hypertension, history of syncopal episode in the past that was felt to be due to bradycardia (pacemaker implantation was recommended at this time but both patient and his family declined this), and history of chronic diastolic heart failure (maintained on Torsemide 10 mg daily).     His blood pressure was high on initial check but it came down to 122/68.  He denies any significant symptoms.  We made no medication changes today.  They feel comfortable with this plan.  They prefer conservative management.  She will return to clinic in 6 months.    Thank you for allowing me to participate in the care of this patient today.     This note was completed in part using Dragon voice recognition software. Although reviewed after completion, some word and grammatical errors may occur.    Orders this Visit:  No orders of the defined types were placed in this encounter.    No orders of the defined types were placed in this encounter.    There are no discontinued medications.      No diagnosis found.    CURRENT MEDICATIONS:  Current Outpatient Medications   Medication Sig Dispense Refill     aspirin 81 MG tablet Take 81 mg by mouth daily.       digoxin (LANOXIN) 125 MCG tablet Take 1 tablet (125 mcg) by mouth daily 90 tablet 3     memantine (NAMENDA) 5 (28)-10 (21) MG tablet Take 10 mg twice daily 180 tablet 3     metoprolol succinate ER (TOPROL-XL) 25 MG 24 hr tablet TAKE 1/2 TABLET BY MOUTH DAILY 45 tablet 3     oxybutynin (DITROPAN) 5 MG tablet Take 1 tablet (5 mg) by mouth 2 times daily 180 tablet 3     simvastatin (ZOCOR) 20 MG tablet TAKE 1 TABLET BY MOUTH AT BEDTIME 90 tablet 0     tamsulosin (FLOMAX) 0.4 MG capsule TAKE 1 CAPSULE BY MOUTH EVERY DAY 90 capsule 3     torsemide (DEMADEX) 10 MG tablet TAKE 1 TABLET(10 MG) BY MOUTH DAILY 90 tablet 1       ALLERGIES     Allergies   Allergen Reactions     Fish Allergy      Coumadin        PAST MEDICAL HISTORY:  Past Medical History:   Diagnosis Date     Aortic valve  disorders     moderate aortic stenosis & 1+ AR per 8/2014 echo     Atrial fibrillation, chronic     Sees cardiologist for meds and testing such as digoxin med/levels      Atrial flutter (H)      Cardiomyopathy (H)     resolved     CHF (congestive heart failure) (H) 10/26/2012     Dementia (H)     chronic memory loss, son saima states he is poa     History of prostate cancer 10/26/2012     Hyperlipidemia LDL goal < 130      Hypertension      Intracranial bleed (H)     while on coumadin     LVH (left ventricular hypertrophy)     mild to moderate per 8/2014 echo     Memory loss, long term 10/26/2012    chronic memory loss, son saima states he is poa     Mitral regurgitation     1+ per 8/2014 echo     Sick sinus syndrome (H)      Unspecified cerebral artery occlusion with cerebral infarction      Urinary retention        PAST SURGICAL HISTORY:  Past Surgical History:   Procedure Laterality Date     GENITOURINARY SURGERY  2010    Resection of prostate     HEAD & NECK SURGERY  2009    Brain swelling - stopped coumadin but kept on aspirin per cardiology     wisdom teeth removal         FAMILY HISTORY:  Family History   Problem Relation Age of Onset     Cancer Mother        SOCIAL HISTORY:  Social History     Socioeconomic History     Marital status:      Spouse name: None     Number of children: None     Years of education: None     Highest education level: None   Occupational History     Employer: RETIRED     Comment: [employer]   Social Needs     Financial resource strain: None     Food insecurity:     Worry: None     Inability: None     Transportation needs:     Medical: None     Non-medical: None   Tobacco Use     Smoking status: Former Smoker     Packs/day: 1.00     Years: 10.00     Pack years: 10.00     Types: Cigarettes     Smokeless tobacco: Never Used   Substance and Sexual Activity     Alcohol use: No     Drug use: No     Sexual activity: Not Currently   Lifestyle     Physical activity:     Days  "per week: None     Minutes per session: None     Stress: None   Relationships     Social connections:     Talks on phone: None     Gets together: None     Attends Zoroastrianism service: None     Active member of club or organization: None     Attends meetings of clubs or organizations: None     Relationship status: None     Intimate partner violence:     Fear of current or ex partner: None     Emotionally abused: None     Physically abused: None     Forced sexual activity: None   Other Topics Concern     Parent/sibling w/ CABG, MI or angioplasty before 65F 55M? No      Service Not Asked     Blood Transfusions Not Asked     Caffeine Concern Not Asked     Occupational Exposure Not Asked     Hobby Hazards Not Asked     Sleep Concern Not Asked     Stress Concern Not Asked     Weight Concern Not Asked     Special Diet Not Asked     Back Care Not Asked     Exercise Yes     Comment: cycling      Bike Helmet Not Asked     Seat Belt Yes     Self-Exams Not Asked   Social History Narrative     None       Review of Systems:  Skin:  Negative     Eyes:  Negative    ENT:  Negative    Respiratory:  Positive for shortness of breath  Cardiovascular:  Negative (edema in left hand)  Gastroenterology: Negative    Genitourinary:  Negative    Musculoskeletal:  Negative    Neurologic:  Positive for memory problems  Psychiatric:  Negative    Heme/Lymph/Imm:  Negative    Endocrine:  Positive for thyroid disorder;diabetes    Physical Exam:  Vitals: /68   Pulse 51   Ht 1.727 m (5' 8\")   Wt 77.7 kg (171 lb 4.8 oz)   SpO2 97%   BMI 26.05 kg/m        GEN:  NAD  NECK: No JVD  C/V:  Regular rate and rhythm with 3/6 LINUS at RUSB.  RESP: Clear to auscultation bilaterally.  GI: Abdomen soft, nontender, nondistended.   EXTREM: No LE edema.   NEURO: Alert and oriented, cooperative.   PSYCH: Normal affect.  SKIN: Warm and dry.       Recent Lab Results:  LIPID RESULTS:  Lab Results   Component Value Date    CHOL 119 10/14/2019    HDL 36 (L) " 10/14/2019    LDL 50 10/14/2019    TRIG 166 (H) 10/14/2019    CHOLHDLRATIO 3.3 10/30/2015       LIVER ENZYME RESULTS:  Lab Results   Component Value Date    AST 21 08/19/2019    ALT 15 08/19/2019       CBC RESULTS:  Lab Results   Component Value Date    WBC 6.8 10/14/2019    RBC 4.03 (L) 10/14/2019    HGB 11.6 (L) 10/14/2019    HCT 36.0 (L) 10/14/2019    MCV 89 10/14/2019    MCH 28.8 10/14/2019    MCHC 32.2 10/14/2019    RDW 14.2 10/14/2019     10/14/2019       BMP RESULTS:  Lab Results   Component Value Date     08/19/2019    POTASSIUM 4.1 08/19/2019    CHLORIDE 109 08/19/2019    CO2 23 08/19/2019    ANIONGAP 8 08/19/2019     (H) 08/19/2019    BUN 22 08/19/2019    CR 1.26 (H) 08/19/2019    GFRESTIMATED 51 (L) 08/19/2019    GFRESTBLACK 59 (L) 08/19/2019    TY 8.5 08/19/2019        A1C RESULTS:  Lab Results   Component Value Date    A1C 5.3 10/14/2019       INR RESULTS:  Lab Results   Component Value Date    INR 1.84 (H) 01/04/2010    INR 3.13 (H) 11/30/2009           Herberth Millard PA-C, MARKEL   November 8, 2019       Thank you for allowing me to participate in the care of your patient.      Sincerely,     Herberth Millard PA-C     [facility]    cc:   No referring provider defined for this encounter.

## 2019-11-08 NOTE — PROGRESS NOTES
Primary Cardiologist: Dr. Mcclain    Reason for Visit: Routine follow up    History of Present Illness:   This is a very pleasant 87-year-old gentleman with past medical history notable for paroxysmal flutter (rate control strategy, he is not on anticoagulation due to history of intracerebral bleed; he has declined watchman device in the past), severe aortic valve stenosis (his echocardiogram in 12/2018 demonstrated severe stenosis of his valve, he was referred to TAVR but both patient and family declined this), hypertension, history of syncopal episode in the past that was felt to be due to bradycardia (pacemaker implantation was recommended at this time but both patient and his family declined this), and history of chronic diastolic heart failure (maintained on Torsemide 10 mg daily).     He returns to clinic today with his son, stating he is overall doing about the same.  He denies any significant shortness of breath, peripheral edema, orthopnea, or PND.  His son reports that several weeks ago he had some peripheral edema but it resolved on its own.  They wanted to come in to be seen in cardiology clinic for routine check.  He denies any chest discomfort or bleeding issues.  They are still not interested in TAVR as they prefer conservative management.     Assessment and Plan:   This is a very pleasant 87-year-old gentleman with past medical history notable for paroxysmal flutter (rate control strategy, he is not on anticoagulation due to history of intracerebral bleed; he has declined watchman device in the past), severe aortic valve stenosis (his echocardiogram in 12/2018 demonstrated severe stenosis of his valve, he was referred to TAVR but both patient and family declined this), hypertension, history of syncopal episode in the past that was felt to be due to bradycardia (pacemaker implantation was recommended at this time but both patient and his family declined this), and history of chronic diastolic heart  failure (maintained on Torsemide 10 mg daily).     His blood pressure was high on initial check but it came down to 122/68.  He denies any significant symptoms.  We made no medication changes today.  They feel comfortable with this plan.  They prefer conservative management.  She will return to clinic in 6 months.    Thank you for allowing me to participate in the care of this patient today.     This note was completed in part using Dragon voice recognition software. Although reviewed after completion, some word and grammatical errors may occur.    Orders this Visit:  No orders of the defined types were placed in this encounter.    No orders of the defined types were placed in this encounter.    There are no discontinued medications.      No diagnosis found.    CURRENT MEDICATIONS:  Current Outpatient Medications   Medication Sig Dispense Refill     aspirin 81 MG tablet Take 81 mg by mouth daily.       digoxin (LANOXIN) 125 MCG tablet Take 1 tablet (125 mcg) by mouth daily 90 tablet 3     memantine (NAMENDA) 5 (28)-10 (21) MG tablet Take 10 mg twice daily 180 tablet 3     metoprolol succinate ER (TOPROL-XL) 25 MG 24 hr tablet TAKE 1/2 TABLET BY MOUTH DAILY 45 tablet 3     oxybutynin (DITROPAN) 5 MG tablet Take 1 tablet (5 mg) by mouth 2 times daily 180 tablet 3     simvastatin (ZOCOR) 20 MG tablet TAKE 1 TABLET BY MOUTH AT BEDTIME 90 tablet 0     tamsulosin (FLOMAX) 0.4 MG capsule TAKE 1 CAPSULE BY MOUTH EVERY DAY 90 capsule 3     torsemide (DEMADEX) 10 MG tablet TAKE 1 TABLET(10 MG) BY MOUTH DAILY 90 tablet 1       ALLERGIES     Allergies   Allergen Reactions     Fish Allergy      Coumadin        PAST MEDICAL HISTORY:  Past Medical History:   Diagnosis Date     Aortic valve disorders     moderate aortic stenosis & 1+ AR per 8/2014 echo     Atrial fibrillation, chronic     Sees cardiologist for meds and testing such as digoxin med/levels      Atrial flutter (H)      Cardiomyopathy (H)     resolved     CHF  (congestive heart failure) (H) 10/26/2012     Dementia (H)     chronic memory loss, son saima states he is poa     History of prostate cancer 10/26/2012     Hyperlipidemia LDL goal < 130      Hypertension      Intracranial bleed (H)     while on coumadin     LVH (left ventricular hypertrophy)     mild to moderate per 8/2014 echo     Memory loss, long term 10/26/2012    chronic memory loss, son saima states he is poa     Mitral regurgitation     1+ per 8/2014 echo     Sick sinus syndrome (H)      Unspecified cerebral artery occlusion with cerebral infarction      Urinary retention        PAST SURGICAL HISTORY:  Past Surgical History:   Procedure Laterality Date     GENITOURINARY SURGERY  2010    Resection of prostate     HEAD & NECK SURGERY  2009    Brain swelling - stopped coumadin but kept on aspirin per cardiology     wisdom teeth removal         FAMILY HISTORY:  Family History   Problem Relation Age of Onset     Cancer Mother        SOCIAL HISTORY:  Social History     Socioeconomic History     Marital status:      Spouse name: None     Number of children: None     Years of education: None     Highest education level: None   Occupational History     Employer: RETIRED     Comment: [employer]   Social Needs     Financial resource strain: None     Food insecurity:     Worry: None     Inability: None     Transportation needs:     Medical: None     Non-medical: None   Tobacco Use     Smoking status: Former Smoker     Packs/day: 1.00     Years: 10.00     Pack years: 10.00     Types: Cigarettes     Smokeless tobacco: Never Used   Substance and Sexual Activity     Alcohol use: No     Drug use: No     Sexual activity: Not Currently   Lifestyle     Physical activity:     Days per week: None     Minutes per session: None     Stress: None   Relationships     Social connections:     Talks on phone: None     Gets together: None     Attends Sikh service: None     Active member of club or organization: None     " Attends meetings of clubs or organizations: None     Relationship status: None     Intimate partner violence:     Fear of current or ex partner: None     Emotionally abused: None     Physically abused: None     Forced sexual activity: None   Other Topics Concern     Parent/sibling w/ CABG, MI or angioplasty before 65F 55M? No      Service Not Asked     Blood Transfusions Not Asked     Caffeine Concern Not Asked     Occupational Exposure Not Asked     Hobby Hazards Not Asked     Sleep Concern Not Asked     Stress Concern Not Asked     Weight Concern Not Asked     Special Diet Not Asked     Back Care Not Asked     Exercise Yes     Comment: cycling      Bike Helmet Not Asked     Seat Belt Yes     Self-Exams Not Asked   Social History Narrative     None       Review of Systems:  Skin:  Negative     Eyes:  Negative    ENT:  Negative    Respiratory:  Positive for shortness of breath  Cardiovascular:  Negative (edema in left hand)  Gastroenterology: Negative    Genitourinary:  Negative    Musculoskeletal:  Negative    Neurologic:  Positive for memory problems  Psychiatric:  Negative    Heme/Lymph/Imm:  Negative    Endocrine:  Positive for thyroid disorder;diabetes    Physical Exam:  Vitals: /68   Pulse 51   Ht 1.727 m (5' 8\")   Wt 77.7 kg (171 lb 4.8 oz)   SpO2 97%   BMI 26.05 kg/m       GEN:  NAD  NECK: No JVD  C/V:  Regular rate and rhythm with 3/6 LINUS at RUSB.  RESP: Clear to auscultation bilaterally.  GI: Abdomen soft, nontender, nondistended.   EXTREM: No LE edema.   NEURO: Alert and oriented, cooperative.   PSYCH: Normal affect.  SKIN: Warm and dry.       Recent Lab Results:  LIPID RESULTS:  Lab Results   Component Value Date    CHOL 119 10/14/2019    HDL 36 (L) 10/14/2019    LDL 50 10/14/2019    TRIG 166 (H) 10/14/2019    CHOLHDLRATIO 3.3 10/30/2015       LIVER ENZYME RESULTS:  Lab Results   Component Value Date    AST 21 08/19/2019    ALT 15 08/19/2019       CBC RESULTS:  Lab Results   Component " Value Date    WBC 6.8 10/14/2019    RBC 4.03 (L) 10/14/2019    HGB 11.6 (L) 10/14/2019    HCT 36.0 (L) 10/14/2019    MCV 89 10/14/2019    MCH 28.8 10/14/2019    MCHC 32.2 10/14/2019    RDW 14.2 10/14/2019     10/14/2019       BMP RESULTS:  Lab Results   Component Value Date     08/19/2019    POTASSIUM 4.1 08/19/2019    CHLORIDE 109 08/19/2019    CO2 23 08/19/2019    ANIONGAP 8 08/19/2019     (H) 08/19/2019    BUN 22 08/19/2019    CR 1.26 (H) 08/19/2019    GFRESTIMATED 51 (L) 08/19/2019    GFRESTBLACK 59 (L) 08/19/2019    TY 8.5 08/19/2019        A1C RESULTS:  Lab Results   Component Value Date    A1C 5.3 10/14/2019       INR RESULTS:  Lab Results   Component Value Date    INR 1.84 (H) 01/04/2010    INR 3.13 (H) 11/30/2009           Herberth Millard PA-C, MARKEL   November 8, 2019

## 2019-11-08 NOTE — LETTER
11/8/2019    Manuelito Ansari MD  [id number]  [facility] [address]    RE: Sylvester Davis       Dear Colleague,    I had the pleasure of seeing Sylvester Davis in the [facility].    Primary Cardiologist: Dr. Mcclain    Reason for Visit: Routine follow up    History of Present Illness:   This is a very pleasant 87-year-old gentleman with past medical history notable for paroxysmal flutter (rate control strategy, he is not on anticoagulation due to history of intracerebral bleed; he has declined watchman device in the past), severe aortic valve stenosis (his echocardiogram in 12/2018 demonstrated severe stenosis of his valve, he was referred to TAVR but both patient and family declined this), hypertension, history of syncopal episode in the past that was felt to be due to bradycardia (pacemaker implantation was recommended at this time but both patient and his family declined this), and history of chronic diastolic heart failure (maintained on Torsemide 10 mg daily).     He returns to clinic today with his son, stating he is overall doing about the same.  He denies any significant shortness of breath, peripheral edema, orthopnea, or PND.  His son reports that several weeks ago he had some peripheral edema but it resolved on its own.  They wanted to come in to be seen in cardiology clinic for routine check.  He denies any chest discomfort or bleeding issues.  They are still not interested in TAVR as they prefer conservative management.     Assessment and Plan:   This is a very pleasant 87-year-old gentleman with past medical history notable for paroxysmal flutter (rate control strategy, he is not on anticoagulation due to history of intracerebral bleed; he has declined watchman device in the past), severe aortic valve stenosis (his echocardiogram in 12/2018 demonstrated severe stenosis of his valve, he was referred to TAVR but both patient and family declined  this), hypertension, history of syncopal episode in the past that was felt to be due to bradycardia (pacemaker implantation was recommended at this time but both patient and his family declined this), and history of chronic diastolic heart failure (maintained on Torsemide 10 mg daily).     His blood pressure was high on initial check but it came down to 122/68.  He denies any significant symptoms.  We made no medication changes today.  They feel comfortable with this plan.  They prefer conservative management.  She will return to clinic in 6 months.    Thank you for allowing me to participate in the care of this patient today.     This note was completed in part using Dragon voice recognition software. Although reviewed after completion, some word and grammatical errors may occur.    Orders this Visit:  No orders of the defined types were placed in this encounter.    No orders of the defined types were placed in this encounter.    There are no discontinued medications.      No diagnosis found.    CURRENT MEDICATIONS:  Current Outpatient Medications   Medication Sig Dispense Refill     aspirin 81 MG tablet Take 81 mg by mouth daily.       digoxin (LANOXIN) 125 MCG tablet Take 1 tablet (125 mcg) by mouth daily 90 tablet 3     memantine (NAMENDA) 5 (28)-10 (21) MG tablet Take 10 mg twice daily 180 tablet 3     metoprolol succinate ER (TOPROL-XL) 25 MG 24 hr tablet TAKE 1/2 TABLET BY MOUTH DAILY 45 tablet 3     oxybutynin (DITROPAN) 5 MG tablet Take 1 tablet (5 mg) by mouth 2 times daily 180 tablet 3     simvastatin (ZOCOR) 20 MG tablet TAKE 1 TABLET BY MOUTH AT BEDTIME 90 tablet 0     tamsulosin (FLOMAX) 0.4 MG capsule TAKE 1 CAPSULE BY MOUTH EVERY DAY 90 capsule 3     torsemide (DEMADEX) 10 MG tablet TAKE 1 TABLET(10 MG) BY MOUTH DAILY 90 tablet 1       ALLERGIES     Allergies   Allergen Reactions     Fish Allergy      Coumadin        PAST MEDICAL HISTORY:  Past Medical History:   Diagnosis Date     Aortic valve  disorders     moderate aortic stenosis & 1+ AR per 8/2014 echo     Atrial fibrillation, chronic     Sees cardiologist for meds and testing such as digoxin med/levels      Atrial flutter (H)      Cardiomyopathy (H)     resolved     CHF (congestive heart failure) (H) 10/26/2012     Dementia (H)     chronic memory loss, son saima states he is poa     History of prostate cancer 10/26/2012     Hyperlipidemia LDL goal < 130      Hypertension      Intracranial bleed (H)     while on coumadin     LVH (left ventricular hypertrophy)     mild to moderate per 8/2014 echo     Memory loss, long term 10/26/2012    chronic memory loss, son saima states he is poa     Mitral regurgitation     1+ per 8/2014 echo     Sick sinus syndrome (H)      Unspecified cerebral artery occlusion with cerebral infarction      Urinary retention        PAST SURGICAL HISTORY:  Past Surgical History:   Procedure Laterality Date     GENITOURINARY SURGERY  2010    Resection of prostate     HEAD & NECK SURGERY  2009    Brain swelling - stopped coumadin but kept on aspirin per cardiology     wisdom teeth removal         FAMILY HISTORY:  Family History   Problem Relation Age of Onset     Cancer Mother        SOCIAL HISTORY:  Social History     Socioeconomic History     Marital status:      Spouse name: None     Number of children: None     Years of education: None     Highest education level: None   Occupational History     Employer: RETIRED     Comment: [employer]   Social Needs     Financial resource strain: None     Food insecurity:     Worry: None     Inability: None     Transportation needs:     Medical: None     Non-medical: None   Tobacco Use     Smoking status: Former Smoker     Packs/day: 1.00     Years: 10.00     Pack years: 10.00     Types: Cigarettes     Smokeless tobacco: Never Used   Substance and Sexual Activity     Alcohol use: No     Drug use: No     Sexual activity: Not Currently   Lifestyle     Physical activity:     Days  "per week: None     Minutes per session: None     Stress: None   Relationships     Social connections:     Talks on phone: None     Gets together: None     Attends Voodoo service: None     Active member of club or organization: None     Attends meetings of clubs or organizations: None     Relationship status: None     Intimate partner violence:     Fear of current or ex partner: None     Emotionally abused: None     Physically abused: None     Forced sexual activity: None   Other Topics Concern     Parent/sibling w/ CABG, MI or angioplasty before 65F 55M? No      Service Not Asked     Blood Transfusions Not Asked     Caffeine Concern Not Asked     Occupational Exposure Not Asked     Hobby Hazards Not Asked     Sleep Concern Not Asked     Stress Concern Not Asked     Weight Concern Not Asked     Special Diet Not Asked     Back Care Not Asked     Exercise Yes     Comment: cycling      Bike Helmet Not Asked     Seat Belt Yes     Self-Exams Not Asked   Social History Narrative     None       Review of Systems:  Skin:  Negative     Eyes:  Negative    ENT:  Negative    Respiratory:  Positive for shortness of breath  Cardiovascular:  Negative (edema in left hand)  Gastroenterology: Negative    Genitourinary:  Negative    Musculoskeletal:  Negative    Neurologic:  Positive for memory problems  Psychiatric:  Negative    Heme/Lymph/Imm:  Negative    Endocrine:  Positive for thyroid disorder;diabetes    Physical Exam:  Vitals: /68   Pulse 51   Ht 1.727 m (5' 8\")   Wt 77.7 kg (171 lb 4.8 oz)   SpO2 97%   BMI 26.05 kg/m        GEN:  NAD  NECK: No JVD  C/V:  Regular rate and rhythm with 3/6 LINUS at RUSB.  RESP: Clear to auscultation bilaterally.  GI: Abdomen soft, nontender, nondistended.   EXTREM: No LE edema.   NEURO: Alert and oriented, cooperative.   PSYCH: Normal affect.  SKIN: Warm and dry.       Recent Lab Results:  LIPID RESULTS:  Lab Results   Component Value Date    CHOL 119 10/14/2019    HDL 36 (L) " 10/14/2019    LDL 50 10/14/2019    TRIG 166 (H) 10/14/2019    CHOLHDLRATIO 3.3 10/30/2015       LIVER ENZYME RESULTS:  Lab Results   Component Value Date    AST 21 08/19/2019    ALT 15 08/19/2019       CBC RESULTS:  Lab Results   Component Value Date    WBC 6.8 10/14/2019    RBC 4.03 (L) 10/14/2019    HGB 11.6 (L) 10/14/2019    HCT 36.0 (L) 10/14/2019    MCV 89 10/14/2019    MCH 28.8 10/14/2019    MCHC 32.2 10/14/2019    RDW 14.2 10/14/2019     10/14/2019       BMP RESULTS:  Lab Results   Component Value Date     08/19/2019    POTASSIUM 4.1 08/19/2019    CHLORIDE 109 08/19/2019    CO2 23 08/19/2019    ANIONGAP 8 08/19/2019     (H) 08/19/2019    BUN 22 08/19/2019    CR 1.26 (H) 08/19/2019    GFRESTIMATED 51 (L) 08/19/2019    GFRESTBLACK 59 (L) 08/19/2019    TY 8.5 08/19/2019        A1C RESULTS:  Lab Results   Component Value Date    A1C 5.3 10/14/2019       INR RESULTS:  Lab Results   Component Value Date    INR 1.84 (H) 01/04/2010    INR 3.13 (H) 11/30/2009             Thank you for allowing me to participate in the care of your patient.    Sincerely,     Herberth Millard PA-C     [facility]

## 2020-01-01 ENCOUNTER — NURSE TRIAGE (OUTPATIENT)
Dept: FAMILY MEDICINE | Facility: CLINIC | Age: 85
End: 2020-01-01

## 2020-01-01 ENCOUNTER — TELEPHONE (OUTPATIENT)
Dept: FAMILY MEDICINE | Facility: CLINIC | Age: 85
End: 2020-01-01

## 2020-01-01 ENCOUNTER — VIRTUAL VISIT (OUTPATIENT)
Dept: FAMILY MEDICINE | Facility: CLINIC | Age: 85
End: 2020-01-01
Payer: MEDICARE

## 2020-01-01 ENCOUNTER — APPOINTMENT (OUTPATIENT)
Dept: ULTRASOUND IMAGING | Facility: CLINIC | Age: 85
DRG: 871 | End: 2020-01-01
Attending: INTERNAL MEDICINE
Payer: MEDICARE

## 2020-01-01 ENCOUNTER — OFFICE VISIT (OUTPATIENT)
Dept: FAMILY MEDICINE | Facility: CLINIC | Age: 85
End: 2020-01-01
Payer: MEDICARE

## 2020-01-01 ENCOUNTER — TELEPHONE (OUTPATIENT)
Dept: CARDIOLOGY | Facility: CLINIC | Age: 85
End: 2020-01-01

## 2020-01-01 ENCOUNTER — APPOINTMENT (OUTPATIENT)
Dept: LAB | Facility: CLINIC | Age: 85
End: 2020-01-01
Payer: MEDICARE

## 2020-01-01 ENCOUNTER — HOSPITAL ENCOUNTER (INPATIENT)
Facility: CLINIC | Age: 85
LOS: 2 days | Discharge: HOSPICE | DRG: 871 | End: 2020-05-22
Attending: EMERGENCY MEDICINE | Admitting: HOSPITALIST
Payer: MEDICARE

## 2020-01-01 ENCOUNTER — APPOINTMENT (OUTPATIENT)
Dept: GENERAL RADIOLOGY | Facility: CLINIC | Age: 85
DRG: 871 | End: 2020-01-01
Attending: EMERGENCY MEDICINE
Payer: MEDICARE

## 2020-01-01 ENCOUNTER — APPOINTMENT (OUTPATIENT)
Dept: SPEECH THERAPY | Facility: CLINIC | Age: 85
DRG: 871 | End: 2020-01-01
Attending: HOSPITALIST
Payer: MEDICARE

## 2020-01-01 VITALS
WEIGHT: 166 LBS | HEIGHT: 68 IN | DIASTOLIC BLOOD PRESSURE: 74 MMHG | BODY MASS INDEX: 25.16 KG/M2 | TEMPERATURE: 97 F | RESPIRATION RATE: 16 BRPM | OXYGEN SATURATION: 98 % | SYSTOLIC BLOOD PRESSURE: 124 MMHG | HEART RATE: 100 BPM

## 2020-01-01 VITALS — BODY MASS INDEX: 25.85 KG/M2 | WEIGHT: 170 LBS

## 2020-01-01 VITALS
TEMPERATURE: 98.4 F | WEIGHT: 165.34 LBS | SYSTOLIC BLOOD PRESSURE: 133 MMHG | RESPIRATION RATE: 18 BRPM | OXYGEN SATURATION: 95 % | HEART RATE: 107 BPM | HEIGHT: 72 IN | BODY MASS INDEX: 22.4 KG/M2 | DIASTOLIC BLOOD PRESSURE: 78 MMHG

## 2020-01-01 DIAGNOSIS — J06.9 UPPER RESPIRATORY TRACT INFECTION, UNSPECIFIED TYPE: Primary | ICD-10-CM

## 2020-01-01 DIAGNOSIS — L97.928 NON-PRESSURE CHRONIC ULCER OF UNSPECIFIED PART OF LEFT LOWER LEG WITH OTHER SPECIFIED SEVERITY (H): ICD-10-CM

## 2020-01-01 DIAGNOSIS — J34.89 NASAL DISCHARGE: Primary | ICD-10-CM

## 2020-01-01 DIAGNOSIS — R65.20 SEVERE SEPSIS (H): Primary | ICD-10-CM

## 2020-01-01 DIAGNOSIS — I50.22 CHRONIC SYSTOLIC HEART FAILURE (H): ICD-10-CM

## 2020-01-01 DIAGNOSIS — F03.90 DEMENTIA WITHOUT BEHAVIORAL DISTURBANCE, UNSPECIFIED DEMENTIA TYPE: ICD-10-CM

## 2020-01-01 DIAGNOSIS — I35.0 SEVERE AORTIC STENOSIS: ICD-10-CM

## 2020-01-01 DIAGNOSIS — D63.1 ANEMIA DUE TO CHRONIC KIDNEY DISEASE, UNSPECIFIED CKD STAGE: ICD-10-CM

## 2020-01-01 DIAGNOSIS — K62.5 RECTAL BLEEDING: Primary | ICD-10-CM

## 2020-01-01 DIAGNOSIS — F02.80 ALZHEIMER'S DEMENTIA WITHOUT BEHAVIORAL DISTURBANCE, UNSPECIFIED TIMING OF DEMENTIA ONSET: ICD-10-CM

## 2020-01-01 DIAGNOSIS — R79.89 ELEVATED TROPONIN: ICD-10-CM

## 2020-01-01 DIAGNOSIS — G30.9 ALZHEIMER'S DEMENTIA WITHOUT BEHAVIORAL DISTURBANCE, UNSPECIFIED TIMING OF DEMENTIA ONSET: ICD-10-CM

## 2020-01-01 DIAGNOSIS — N18.30 CKD (CHRONIC KIDNEY DISEASE) STAGE 3, GFR 30-59 ML/MIN (H): ICD-10-CM

## 2020-01-01 DIAGNOSIS — E11.21 TYPE 2 DIABETES MELLITUS WITH DIABETIC NEPHROPATHY, WITHOUT LONG-TERM CURRENT USE OF INSULIN (H): ICD-10-CM

## 2020-01-01 DIAGNOSIS — A41.9 SEVERE SEPSIS (H): Primary | ICD-10-CM

## 2020-01-01 DIAGNOSIS — J69.0 ASPIRATION PNEUMONIA OF LEFT LOWER LOBE, UNSPECIFIED ASPIRATION PNEUMONIA TYPE (H): ICD-10-CM

## 2020-01-01 DIAGNOSIS — N18.9 ANEMIA DUE TO CHRONIC KIDNEY DISEASE, UNSPECIFIED CKD STAGE: ICD-10-CM

## 2020-01-01 DIAGNOSIS — L89.300 PRESSURE INJURY OF BUTTOCK, UNSTAGEABLE, UNSPECIFIED LATERALITY (H): ICD-10-CM

## 2020-01-01 DIAGNOSIS — I50.22 CHRONIC SYSTOLIC CONGESTIVE HEART FAILURE (H): ICD-10-CM

## 2020-01-01 DIAGNOSIS — L89.891 PRESSURE INJURY OF FOOT, STAGE 1, UNSPECIFIED LATERALITY: Primary | ICD-10-CM

## 2020-01-01 DIAGNOSIS — J96.01 ACUTE RESPIRATORY FAILURE WITH HYPOXIA (H): ICD-10-CM

## 2020-01-01 DIAGNOSIS — F01.50 VASCULAR DEMENTIA, UNCOMPLICATED (H): ICD-10-CM

## 2020-01-01 DIAGNOSIS — R13.10 DYSPHAGIA, UNSPECIFIED TYPE: ICD-10-CM

## 2020-01-01 DIAGNOSIS — I10 ESSENTIAL HYPERTENSION: Primary | ICD-10-CM

## 2020-01-01 DIAGNOSIS — R41.3 SHORT-TERM MEMORY LOSS: ICD-10-CM

## 2020-01-01 DIAGNOSIS — I48.20 CHRONIC ATRIAL FIBRILLATION (H): ICD-10-CM

## 2020-01-01 LAB
ALBUMIN SERPL-MCNC: 2.1 G/DL (ref 3.4–5)
ALBUMIN SERPL-MCNC: 2.6 G/DL (ref 3.4–5)
ALBUMIN UR-MCNC: 10 MG/DL
ALP SERPL-CCNC: 115 U/L (ref 40–150)
ALP SERPL-CCNC: 133 U/L (ref 40–150)
ALT SERPL W P-5'-P-CCNC: 17 U/L (ref 0–70)
ALT SERPL W P-5'-P-CCNC: 19 U/L (ref 0–70)
ANION GAP SERPL CALCULATED.3IONS-SCNC: 7 MMOL/L (ref 3–14)
ANION GAP SERPL CALCULATED.3IONS-SCNC: 9 MMOL/L (ref 3–14)
APPEARANCE FLD: CLEAR
APPEARANCE UR: CLEAR
AST SERPL W P-5'-P-CCNC: 24 U/L (ref 0–45)
AST SERPL W P-5'-P-CCNC: 54 U/L (ref 0–45)
BASOPHILS # BLD AUTO: 0 10E9/L (ref 0–0.2)
BASOPHILS # BLD AUTO: 0 10E9/L (ref 0–0.2)
BASOPHILS NFR BLD AUTO: 0.1 %
BASOPHILS NFR BLD AUTO: 0.2 %
BILIRUB SERPL-MCNC: 1 MG/DL (ref 0.2–1.3)
BILIRUB SERPL-MCNC: 2 MG/DL (ref 0.2–1.3)
BILIRUB UR QL STRIP: NEGATIVE
BUN SERPL-MCNC: 16 MG/DL (ref 7–30)
BUN SERPL-MCNC: 27 MG/DL (ref 7–30)
BUN SERPL-MCNC: 32 MG/DL (ref 7–30)
BUN SERPL-MCNC: 33 MG/DL (ref 7–30)
CALCIUM SERPL-MCNC: 10.3 MG/DL (ref 8.5–10.1)
CALCIUM SERPL-MCNC: 10.9 MG/DL (ref 8.5–10.1)
CALCIUM SERPL-MCNC: 8.8 MG/DL (ref 8.5–10.1)
CALCIUM SERPL-MCNC: 9.9 MG/DL (ref 8.5–10.1)
CHLORIDE SERPL-SCNC: 102 MMOL/L (ref 94–109)
CHLORIDE SERPL-SCNC: 107 MMOL/L (ref 94–109)
CHLORIDE SERPL-SCNC: 111 MMOL/L (ref 94–109)
CHLORIDE SERPL-SCNC: 117 MMOL/L (ref 94–109)
CO2 SERPL-SCNC: 22 MMOL/L (ref 20–32)
CO2 SERPL-SCNC: 23 MMOL/L (ref 20–32)
CO2 SERPL-SCNC: 26 MMOL/L (ref 20–32)
CO2 SERPL-SCNC: 27 MMOL/L (ref 20–32)
COLOR FLD: YELLOW
COLOR UR AUTO: YELLOW
CREAT SERPL-MCNC: 1.03 MG/DL (ref 0.66–1.25)
CREAT SERPL-MCNC: 1.11 MG/DL (ref 0.66–1.25)
CREAT SERPL-MCNC: 1.12 MG/DL (ref 0.66–1.25)
CREAT SERPL-MCNC: 1.23 MG/DL (ref 0.66–1.25)
CREAT SERPL-MCNC: 1.27 MG/DL (ref 0.66–1.25)
CRP SERPL-MCNC: 138 MG/L (ref 0–8)
DIFFERENTIAL METHOD BLD: ABNORMAL
DIFFERENTIAL METHOD BLD: ABNORMAL
DIFFERENTIAL METHOD BLD: NORMAL
DIGOXIN SERPL-MCNC: 1 UG/L (ref 0.5–2)
DIGOXIN SERPL-MCNC: 1.7 UG/L (ref 0.5–2)
EOSINOPHIL # BLD AUTO: 0 10E9/L (ref 0–0.7)
EOSINOPHIL # BLD AUTO: 0.1 10E9/L (ref 0–0.7)
EOSINOPHIL NFR BLD AUTO: 0.3 %
EOSINOPHIL NFR BLD AUTO: 1.3 %
ERYTHROCYTE [DISTWIDTH] IN BLOOD BY AUTOMATED COUNT: 18.7 % (ref 10–15)
ERYTHROCYTE [DISTWIDTH] IN BLOOD BY AUTOMATED COUNT: 18.8 % (ref 10–15)
ERYTHROCYTE [DISTWIDTH] IN BLOOD BY AUTOMATED COUNT: 19 % (ref 10–15)
ERYTHROCYTE [DISTWIDTH] IN BLOOD BY AUTOMATED COUNT: 19.4 % (ref 10–15)
ERYTHROCYTE [DISTWIDTH] IN BLOOD BY AUTOMATED COUNT: NORMAL % (ref 10–15)
FERRITIN SERPL-MCNC: 367 NG/ML (ref 26–388)
GFR SERPL CREATININE-BSD FRML MDRD: 50 ML/MIN/{1.73_M2}
GFR SERPL CREATININE-BSD FRML MDRD: 52 ML/MIN/{1.73_M2}
GFR SERPL CREATININE-BSD FRML MDRD: 58 ML/MIN/{1.73_M2}
GFR SERPL CREATININE-BSD FRML MDRD: 59 ML/MIN/{1.73_M2}
GFR SERPL CREATININE-BSD FRML MDRD: 64 ML/MIN/{1.73_M2}
GLUCOSE BLDC GLUCOMTR-MCNC: 104 MG/DL (ref 70–99)
GLUCOSE BLDC GLUCOMTR-MCNC: 72 MG/DL (ref 70–99)
GLUCOSE BLDC GLUCOMTR-MCNC: 82 MG/DL (ref 70–99)
GLUCOSE FLD-MCNC: 83 MG/DL
GLUCOSE SERPL-MCNC: 130 MG/DL (ref 70–99)
GLUCOSE SERPL-MCNC: 165 MG/DL (ref 70–99)
GLUCOSE SERPL-MCNC: 79 MG/DL (ref 70–99)
GLUCOSE SERPL-MCNC: 88 MG/DL (ref 70–99)
GLUCOSE UR STRIP-MCNC: NEGATIVE MG/DL
GRAM STN SPEC: NORMAL
HBA1C MFR BLD: 5.3 % (ref 0–5.6)
HCT VFR BLD AUTO: 32.6 % (ref 40–53)
HCT VFR BLD AUTO: 33.8 % (ref 40–53)
HCT VFR BLD AUTO: 34.3 % (ref 40–53)
HCT VFR BLD AUTO: 38.3 % (ref 40–53)
HCT VFR BLD AUTO: NORMAL % (ref 40–53)
HGB BLD-MCNC: 10.2 G/DL (ref 13.3–17.7)
HGB BLD-MCNC: 11.5 G/DL (ref 13.3–17.7)
HGB BLD-MCNC: 9.7 G/DL (ref 13.3–17.7)
HGB BLD-MCNC: 9.8 G/DL (ref 13.3–17.7)
HGB BLD-MCNC: NORMAL G/DL (ref 13.3–17.7)
HGB UR QL STRIP: NEGATIVE
IMM GRANULOCYTES # BLD: 0 10E9/L (ref 0–0.4)
IMM GRANULOCYTES NFR BLD: 0.3 %
INR PPP: 1.4 (ref 0.86–1.14)
INTERPRETATION ECG - MUSE: NORMAL
KETONES UR STRIP-MCNC: NEGATIVE MG/DL
LACTATE BLD-SCNC: 1.7 MMOL/L (ref 0.7–2)
LACTATE BLD-SCNC: 2.3 MMOL/L (ref 0.7–2)
LACTATE BLD-SCNC: 2.3 MMOL/L (ref 0.7–2)
LACTATE BLD-SCNC: 2.4 MMOL/L (ref 0.7–2)
LACTATE BLD-SCNC: 2.8 MMOL/L (ref 0.7–2)
LDH FLD L TO P-CCNC: 257 U/L
LDH SERPL L TO P-CCNC: 527 U/L (ref 85–227)
LEUKOCYTE ESTERASE UR QL STRIP: NEGATIVE
LYMPHOCYTES # BLD AUTO: 0.8 10E9/L (ref 0.8–5.3)
LYMPHOCYTES # BLD AUTO: 1.1 10E9/L (ref 0.8–5.3)
LYMPHOCYTES NFR BLD AUTO: 10.3 %
LYMPHOCYTES NFR BLD AUTO: 5.1 %
MAGNESIUM SERPL-MCNC: 2.1 MG/DL (ref 1.6–2.3)
MCH RBC QN AUTO: 26.6 PG (ref 26.5–33)
MCH RBC QN AUTO: 26.8 PG (ref 26.5–33)
MCH RBC QN AUTO: 27 PG (ref 26.5–33)
MCH RBC QN AUTO: 27.1 PG (ref 26.5–33)
MCH RBC QN AUTO: NORMAL PG (ref 26.5–33)
MCHC RBC AUTO-ENTMCNC: 29 G/DL (ref 31.5–36.5)
MCHC RBC AUTO-ENTMCNC: 29.7 G/DL (ref 31.5–36.5)
MCHC RBC AUTO-ENTMCNC: 29.8 G/DL (ref 31.5–36.5)
MCHC RBC AUTO-ENTMCNC: 30 G/DL (ref 31.5–36.5)
MCHC RBC AUTO-ENTMCNC: NORMAL G/DL (ref 31.5–36.5)
MCV RBC AUTO: 90 FL (ref 78–100)
MCV RBC AUTO: 90 FL (ref 78–100)
MCV RBC AUTO: 91 FL (ref 78–100)
MCV RBC AUTO: 92 FL (ref 78–100)
MCV RBC AUTO: NORMAL FL (ref 78–100)
MONOCYTES # BLD AUTO: 0.7 10E9/L (ref 0–1.3)
MONOCYTES # BLD AUTO: 1.3 10E9/L (ref 0–1.3)
MONOCYTES NFR BLD AUTO: 7.2 %
MONOCYTES NFR BLD AUTO: 8.6 %
MUCOUS THREADS #/AREA URNS LPF: PRESENT /LPF
NEUTROPHILS # BLD AUTO: 13 10E9/L (ref 1.6–8.3)
NEUTROPHILS # BLD AUTO: 8.3 10E9/L (ref 1.6–8.3)
NEUTROPHILS NFR BLD AUTO: 81 %
NEUTROPHILS NFR BLD AUTO: 85.6 %
NITRATE UR QL: NEGATIVE
NRBC # BLD AUTO: 0 10*3/UL
NRBC BLD AUTO-RTO: 0 /100
NT-PROBNP SERPL-MCNC: ABNORMAL PG/ML (ref 0–1800)
PH FLD: 8.5 PH
PH UR STRIP: 5 PH (ref 5–7)
PLATELET # BLD AUTO: 181 10E9/L (ref 150–450)
PLATELET # BLD AUTO: 276 10E9/L (ref 150–450)
PLATELET # BLD AUTO: 288 10E9/L (ref 150–450)
PLATELET # BLD AUTO: 315 10E9/L (ref 150–450)
PLATELET # BLD AUTO: 359 10E9/L (ref 150–450)
PLATELET # BLD AUTO: NORMAL 10E9/L (ref 150–450)
POTASSIUM SERPL-SCNC: 4 MMOL/L (ref 3.4–5.3)
POTASSIUM SERPL-SCNC: 4.4 MMOL/L (ref 3.4–5.3)
POTASSIUM SERPL-SCNC: 4.4 MMOL/L (ref 3.4–5.3)
POTASSIUM SERPL-SCNC: 4.8 MMOL/L (ref 3.4–5.3)
PROCALCITONIN SERPL-MCNC: 0.23 NG/ML
PROT FLD-MCNC: 2.2 G/DL
PROT SERPL-MCNC: 6.9 G/DL (ref 6.8–8.8)
PROT SERPL-MCNC: 7.7 G/DL (ref 6.8–8.8)
PROT SERPL-MCNC: 7.9 G/DL (ref 6.8–8.8)
RBC # BLD AUTO: 3.62 10E12/L (ref 4.4–5.9)
RBC # BLD AUTO: 3.68 10E12/L (ref 4.4–5.9)
RBC # BLD AUTO: 3.78 10E12/L (ref 4.4–5.9)
RBC # BLD AUTO: 4.24 10E12/L (ref 4.4–5.9)
RBC # BLD AUTO: NORMAL 10E12/L (ref 4.4–5.9)
RBC #/AREA URNS AUTO: 1 /HPF (ref 0–2)
SARS-COV-2 PCR COMMENT: NORMAL
SARS-COV-2 RNA SPEC QL NAA+PROBE: NEGATIVE
SARS-COV-2 RNA SPEC QL NAA+PROBE: NORMAL
SODIUM SERPL-SCNC: 136 MMOL/L (ref 133–144)
SODIUM SERPL-SCNC: 140 MMOL/L (ref 133–144)
SODIUM SERPL-SCNC: 142 MMOL/L (ref 133–144)
SODIUM SERPL-SCNC: 147 MMOL/L (ref 133–144)
SOURCE: ABNORMAL
SP GR UR STRIP: 1.01 (ref 1–1.03)
SPECIMEN SOURCE FLD: NORMAL
SPECIMEN SOURCE: NORMAL
TROPONIN I SERPL-MCNC: 2.91 UG/L (ref 0–0.04)
UROBILINOGEN UR STRIP-MCNC: NORMAL MG/DL (ref 0–2)
VIT B12 SERPL-MCNC: 842 PG/ML (ref 193–986)
WBC # BLD AUTO: 10.2 10E9/L (ref 4–11)
WBC # BLD AUTO: 13.4 10E9/L (ref 4–11)
WBC # BLD AUTO: 15.4 10E9/L (ref 4–11)
WBC # BLD AUTO: 16.6 10E9/L (ref 4–11)
WBC # BLD AUTO: NORMAL 10E9/L (ref 4–11)
WBC # FLD AUTO: NORMAL /UL
WBC #/AREA URNS AUTO: 1 /HPF (ref 0–5)

## 2020-01-01 PROCEDURE — 12000011 ZZH R&B MS OVERFLOW

## 2020-01-01 PROCEDURE — 87186 SC STD MICRODIL/AGAR DIL: CPT | Performed by: EMERGENCY MEDICINE

## 2020-01-01 PROCEDURE — 96365 THER/PROPH/DIAG IV INF INIT: CPT

## 2020-01-01 PROCEDURE — 71045 X-RAY EXAM CHEST 1 VIEW: CPT

## 2020-01-01 PROCEDURE — 87635 SARS-COV-2 COVID-19 AMP PRB: CPT | Performed by: EMERGENCY MEDICINE

## 2020-01-01 PROCEDURE — 85049 AUTOMATED PLATELET COUNT: CPT | Performed by: HOSPITALIST

## 2020-01-01 PROCEDURE — 87015 SPECIMEN INFECT AGNT CONCNTJ: CPT | Performed by: INTERNAL MEDICINE

## 2020-01-01 PROCEDURE — 99238 HOSP IP/OBS DSCHRG MGMT 30/<: CPT | Performed by: INTERNAL MEDICINE

## 2020-01-01 PROCEDURE — 32555 ASPIRATE PLEURA W/ IMAGING: CPT

## 2020-01-01 PROCEDURE — 36415 COLL VENOUS BLD VENIPUNCTURE: CPT | Performed by: INTERNAL MEDICINE

## 2020-01-01 PROCEDURE — 25000128 H RX IP 250 OP 636: Performed by: HOSPITALIST

## 2020-01-01 PROCEDURE — 87075 CULTR BACTERIA EXCEPT BLOOD: CPT | Performed by: INTERNAL MEDICINE

## 2020-01-01 PROCEDURE — 84145 PROCALCITONIN (PCT): CPT | Performed by: EMERGENCY MEDICINE

## 2020-01-01 PROCEDURE — 12000000 ZZH R&B MED SURG/OB

## 2020-01-01 PROCEDURE — 83615 LACTATE (LD) (LDH) ENZYME: CPT | Performed by: INTERNAL MEDICINE

## 2020-01-01 PROCEDURE — 25000125 ZZHC RX 250: Performed by: HOSPITALIST

## 2020-01-01 PROCEDURE — 83605 ASSAY OF LACTIC ACID: CPT | Performed by: INTERNAL MEDICINE

## 2020-01-01 PROCEDURE — 25000132 ZZH RX MED GY IP 250 OP 250 PS 637: Mod: GY | Performed by: HOSPITALIST

## 2020-01-01 PROCEDURE — 25000125 ZZHC RX 250: Performed by: RADIOLOGY

## 2020-01-01 PROCEDURE — 80048 BASIC METABOLIC PNL TOTAL CA: CPT | Performed by: HOSPITALIST

## 2020-01-01 PROCEDURE — 87800 DETECT AGNT MULT DNA DIREC: CPT | Performed by: EMERGENCY MEDICINE

## 2020-01-01 PROCEDURE — 99215 OFFICE O/P EST HI 40 MIN: CPT | Mod: 95 | Performed by: INTERNAL MEDICINE

## 2020-01-01 PROCEDURE — 82607 VITAMIN B-12: CPT | Performed by: INTERNAL MEDICINE

## 2020-01-01 PROCEDURE — 83036 HEMOGLOBIN GLYCOSYLATED A1C: CPT | Performed by: INTERNAL MEDICINE

## 2020-01-01 PROCEDURE — 87205 SMEAR GRAM STAIN: CPT | Performed by: INTERNAL MEDICINE

## 2020-01-01 PROCEDURE — 25000128 H RX IP 250 OP 636: Performed by: INTERNAL MEDICINE

## 2020-01-01 PROCEDURE — 81003 URINALYSIS AUTO W/O SCOPE: CPT | Performed by: HOSPITALIST

## 2020-01-01 PROCEDURE — 0W9B3ZZ DRAINAGE OF LEFT PLEURAL CAVITY, PERCUTANEOUS APPROACH: ICD-10-PCS | Performed by: RADIOLOGY

## 2020-01-01 PROCEDURE — 82728 ASSAY OF FERRITIN: CPT | Performed by: INTERNAL MEDICINE

## 2020-01-01 PROCEDURE — 36415 COLL VENOUS BLD VENIPUNCTURE: CPT | Performed by: HOSPITALIST

## 2020-01-01 PROCEDURE — 84157 ASSAY OF PROTEIN OTHER: CPT | Performed by: INTERNAL MEDICINE

## 2020-01-01 PROCEDURE — 96366 THER/PROPH/DIAG IV INF ADDON: CPT

## 2020-01-01 PROCEDURE — 99442 ZZC PHYSICIAN TELEPHONE EVALUATION 11-20 MIN: CPT | Performed by: FAMILY MEDICINE

## 2020-01-01 PROCEDURE — 85610 PROTHROMBIN TIME: CPT | Performed by: INTERNAL MEDICINE

## 2020-01-01 PROCEDURE — 99291 CRITICAL CARE FIRST HOUR: CPT

## 2020-01-01 PROCEDURE — 85027 COMPLETE CBC AUTOMATED: CPT | Performed by: HOSPITALIST

## 2020-01-01 PROCEDURE — 80162 ASSAY OF DIGOXIN TOTAL: CPT | Performed by: EMERGENCY MEDICINE

## 2020-01-01 PROCEDURE — 87070 CULTURE OTHR SPECIMN AEROBIC: CPT | Performed by: INTERNAL MEDICINE

## 2020-01-01 PROCEDURE — 83986 ASSAY PH BODY FLUID NOS: CPT | Performed by: INTERNAL MEDICINE

## 2020-01-01 PROCEDURE — 82565 ASSAY OF CREATININE: CPT | Performed by: HOSPITALIST

## 2020-01-01 PROCEDURE — 25000128 H RX IP 250 OP 636: Performed by: EMERGENCY MEDICINE

## 2020-01-01 PROCEDURE — 99207 ZZC CDG-CODE CATEGORY CHANGED: CPT | Performed by: INTERNAL MEDICINE

## 2020-01-01 PROCEDURE — 85025 COMPLETE CBC W/AUTO DIFF WBC: CPT | Performed by: EMERGENCY MEDICINE

## 2020-01-01 PROCEDURE — 80053 COMPREHEN METABOLIC PANEL: CPT | Performed by: EMERGENCY MEDICINE

## 2020-01-01 PROCEDURE — 83605 ASSAY OF LACTIC ACID: CPT | Performed by: HOSPITALIST

## 2020-01-01 PROCEDURE — 00000146 ZZHCL STATISTIC GLUCOSE BY METER IP

## 2020-01-01 PROCEDURE — 99232 SBSQ HOSP IP/OBS MODERATE 35: CPT | Performed by: INTERNAL MEDICINE

## 2020-01-01 PROCEDURE — 83735 ASSAY OF MAGNESIUM: CPT | Performed by: HOSPITALIST

## 2020-01-01 PROCEDURE — 99223 1ST HOSP IP/OBS HIGH 75: CPT | Mod: AI | Performed by: HOSPITALIST

## 2020-01-01 PROCEDURE — 85025 COMPLETE CBC W/AUTO DIFF WBC: CPT | Performed by: INTERNAL MEDICINE

## 2020-01-01 PROCEDURE — 99443 ZZC PHYSICIAN TELEPHONE EVALUATION 21-30 MIN: CPT | Performed by: INTERNAL MEDICINE

## 2020-01-01 PROCEDURE — 25800030 ZZH RX IP 258 OP 636: Performed by: HOSPITALIST

## 2020-01-01 PROCEDURE — 81015 MICROSCOPIC EXAM OF URINE: CPT | Performed by: HOSPITALIST

## 2020-01-01 PROCEDURE — 84484 ASSAY OF TROPONIN QUANT: CPT | Performed by: EMERGENCY MEDICINE

## 2020-01-01 PROCEDURE — G2012 BRIEF CHECK IN BY MD/QHP: HCPCS | Performed by: FAMILY MEDICINE

## 2020-01-01 PROCEDURE — 93005 ELECTROCARDIOGRAM TRACING: CPT

## 2020-01-01 PROCEDURE — 83605 ASSAY OF LACTIC ACID: CPT | Performed by: EMERGENCY MEDICINE

## 2020-01-01 PROCEDURE — 80053 COMPREHEN METABOLIC PANEL: CPT | Performed by: INTERNAL MEDICINE

## 2020-01-01 PROCEDURE — 25800030 ZZH RX IP 258 OP 636: Performed by: INTERNAL MEDICINE

## 2020-01-01 PROCEDURE — 25000132 ZZH RX MED GY IP 250 OP 250 PS 637: Mod: GY | Performed by: INTERNAL MEDICINE

## 2020-01-01 PROCEDURE — 82945 GLUCOSE OTHER FLUID: CPT | Performed by: INTERNAL MEDICINE

## 2020-01-01 PROCEDURE — 84155 ASSAY OF PROTEIN SERUM: CPT | Performed by: INTERNAL MEDICINE

## 2020-01-01 PROCEDURE — 40000863 ZZH STATISTIC RADIOLOGY XRAY, US, CT, MAR, NM

## 2020-01-01 PROCEDURE — G0463 HOSPITAL OUTPT CLINIC VISIT: HCPCS

## 2020-01-01 PROCEDURE — 80162 ASSAY OF DIGOXIN TOTAL: CPT | Performed by: INTERNAL MEDICINE

## 2020-01-01 PROCEDURE — 36415 COLL VENOUS BLD VENIPUNCTURE: CPT

## 2020-01-01 PROCEDURE — 86140 C-REACTIVE PROTEIN: CPT | Performed by: EMERGENCY MEDICINE

## 2020-01-01 PROCEDURE — 99213 OFFICE O/P EST LOW 20 MIN: CPT | Performed by: FAMILY MEDICINE

## 2020-01-01 PROCEDURE — 25800030 ZZH RX IP 258 OP 636: Performed by: EMERGENCY MEDICINE

## 2020-01-01 PROCEDURE — 83880 ASSAY OF NATRIURETIC PEPTIDE: CPT | Performed by: EMERGENCY MEDICINE

## 2020-01-01 PROCEDURE — 92610 EVALUATE SWALLOWING FUNCTION: CPT | Mod: GN

## 2020-01-01 PROCEDURE — 87040 BLOOD CULTURE FOR BACTERIA: CPT | Performed by: EMERGENCY MEDICINE

## 2020-01-01 PROCEDURE — 85027 COMPLETE CBC AUTOMATED: CPT | Performed by: INTERNAL MEDICINE

## 2020-01-01 PROCEDURE — 96361 HYDRATE IV INFUSION ADD-ON: CPT

## 2020-01-01 PROCEDURE — 80048 BASIC METABOLIC PNL TOTAL CA: CPT | Performed by: INTERNAL MEDICINE

## 2020-01-01 PROCEDURE — 87077 CULTURE AEROBIC IDENTIFY: CPT | Performed by: EMERGENCY MEDICINE

## 2020-01-01 RX ORDER — TAMSULOSIN HYDROCHLORIDE 0.4 MG/1
0.4 CAPSULE ORAL DAILY
Status: DISCONTINUED | OUTPATIENT
Start: 2020-01-01 | End: 2020-01-01 | Stop reason: HOSPADM

## 2020-01-01 RX ORDER — TORSEMIDE 10 MG/1
TABLET ORAL
Qty: 135 TABLET | Refills: 1
Start: 2020-01-01

## 2020-01-01 RX ORDER — POTASSIUM CHLORIDE 29.8 MG/ML
20 INJECTION INTRAVENOUS
Status: DISCONTINUED | OUTPATIENT
Start: 2020-01-01 | End: 2020-01-01 | Stop reason: HOSPADM

## 2020-01-01 RX ORDER — PROCHLORPERAZINE MALEATE 5 MG
5 TABLET ORAL EVERY 6 HOURS PRN
Status: DISCONTINUED | OUTPATIENT
Start: 2020-01-01 | End: 2020-01-01 | Stop reason: HOSPADM

## 2020-01-01 RX ORDER — LIDOCAINE 40 MG/G
CREAM TOPICAL
Status: DISCONTINUED | OUTPATIENT
Start: 2020-01-01 | End: 2020-01-01 | Stop reason: HOSPADM

## 2020-01-01 RX ORDER — POTASSIUM CL/LIDO/0.9 % NACL 10MEQ/0.1L
10 INTRAVENOUS SOLUTION, PIGGYBACK (ML) INTRAVENOUS
Status: DISCONTINUED | OUTPATIENT
Start: 2020-01-01 | End: 2020-01-01 | Stop reason: HOSPADM

## 2020-01-01 RX ORDER — METOCLOPRAMIDE HYDROCHLORIDE 5 MG/ML
5 INJECTION INTRAMUSCULAR; INTRAVENOUS EVERY 6 HOURS PRN
Status: DISCONTINUED | OUTPATIENT
Start: 2020-01-01 | End: 2020-01-01 | Stop reason: HOSPADM

## 2020-01-01 RX ORDER — OXYBUTYNIN CHLORIDE 5 MG/1
5 TABLET ORAL 2 TIMES DAILY
Status: DISCONTINUED | OUTPATIENT
Start: 2020-01-01 | End: 2020-01-01 | Stop reason: HOSPADM

## 2020-01-01 RX ORDER — ACETAMINOPHEN 650 MG/1
650 SUPPOSITORY RECTAL EVERY 4 HOURS PRN
Status: DISCONTINUED | OUTPATIENT
Start: 2020-01-01 | End: 2020-01-01 | Stop reason: HOSPADM

## 2020-01-01 RX ORDER — AMOXICILLIN 250 MG
1 CAPSULE ORAL 2 TIMES DAILY PRN
Status: DISCONTINUED | OUTPATIENT
Start: 2020-01-01 | End: 2020-01-01 | Stop reason: HOSPADM

## 2020-01-01 RX ORDER — PIPERACILLIN SODIUM, TAZOBACTAM SODIUM 3; .375 G/15ML; G/15ML
3.38 INJECTION, POWDER, LYOPHILIZED, FOR SOLUTION INTRAVENOUS ONCE
Status: COMPLETED | OUTPATIENT
Start: 2020-01-01 | End: 2020-01-01

## 2020-01-01 RX ORDER — DIGOXIN 125 MCG
125 TABLET ORAL EVERY OTHER DAY
Status: DISCONTINUED | OUTPATIENT
Start: 2020-01-01 | End: 2020-01-01 | Stop reason: HOSPADM

## 2020-01-01 RX ORDER — LIDOCAINE HYDROCHLORIDE 10 MG/ML
10 INJECTION, SOLUTION EPIDURAL; INFILTRATION; INTRACAUDAL; PERINEURAL ONCE
Status: COMPLETED | OUTPATIENT
Start: 2020-01-01 | End: 2020-01-01

## 2020-01-01 RX ORDER — POTASSIUM CHLORIDE 7.45 MG/ML
10 INJECTION INTRAVENOUS
Status: DISCONTINUED | OUTPATIENT
Start: 2020-01-01 | End: 2020-01-01 | Stop reason: HOSPADM

## 2020-01-01 RX ORDER — TORSEMIDE 10 MG/1
TABLET ORAL
Qty: 90 TABLET | Refills: 1 | Status: SHIPPED | OUTPATIENT
Start: 2020-01-01 | End: 2020-01-01

## 2020-01-01 RX ORDER — POTASSIUM CHLORIDE 1.5 G/1.58G
20-40 POWDER, FOR SOLUTION ORAL
Status: DISCONTINUED | OUTPATIENT
Start: 2020-01-01 | End: 2020-01-01 | Stop reason: HOSPADM

## 2020-01-01 RX ORDER — DIGOXIN 125 MCG
TABLET ORAL
Qty: 45 TABLET | Refills: 3
Start: 2020-01-01

## 2020-01-01 RX ORDER — PROCHLORPERAZINE 25 MG
12.5 SUPPOSITORY, RECTAL RECTAL EVERY 12 HOURS PRN
Status: DISCONTINUED | OUTPATIENT
Start: 2020-01-01 | End: 2020-01-01 | Stop reason: HOSPADM

## 2020-01-01 RX ORDER — SIMVASTATIN 20 MG
20 TABLET ORAL AT BEDTIME
Status: DISCONTINUED | OUTPATIENT
Start: 2020-01-01 | End: 2020-01-01 | Stop reason: HOSPADM

## 2020-01-01 RX ORDER — ATROPINE SULFATE 10 MG/ML
2 SOLUTION/ DROPS OPHTHALMIC
Qty: 5 ML | Refills: 1 | Status: SHIPPED | OUTPATIENT
Start: 2020-01-01

## 2020-01-01 RX ORDER — POTASSIUM CHLORIDE 1500 MG/1
20-40 TABLET, EXTENDED RELEASE ORAL
Status: DISCONTINUED | OUTPATIENT
Start: 2020-01-01 | End: 2020-01-01 | Stop reason: HOSPADM

## 2020-01-01 RX ORDER — ACETAMINOPHEN 325 MG/1
650 TABLET ORAL EVERY 4 HOURS PRN
Status: DISCONTINUED | OUTPATIENT
Start: 2020-01-01 | End: 2020-01-01 | Stop reason: HOSPADM

## 2020-01-01 RX ORDER — SENNOSIDES 8.6 MG
2 TABLET ORAL 2 TIMES DAILY
Qty: 100 TABLET | Refills: 1 | Status: SHIPPED | OUTPATIENT
Start: 2020-01-01

## 2020-01-01 RX ORDER — AMOXICILLIN 250 MG
2 CAPSULE ORAL 2 TIMES DAILY PRN
Status: DISCONTINUED | OUTPATIENT
Start: 2020-01-01 | End: 2020-01-01 | Stop reason: HOSPADM

## 2020-01-01 RX ORDER — PIPERACILLIN SODIUM, TAZOBACTAM SODIUM 3; .375 G/15ML; G/15ML
3.38 INJECTION, POWDER, LYOPHILIZED, FOR SOLUTION INTRAVENOUS EVERY 6 HOURS
Status: DISCONTINUED | OUTPATIENT
Start: 2020-01-01 | End: 2020-01-01 | Stop reason: HOSPADM

## 2020-01-01 RX ORDER — BISACODYL 10 MG
10 SUPPOSITORY, RECTAL RECTAL DAILY PRN
Status: DISCONTINUED | OUTPATIENT
Start: 2020-01-01 | End: 2020-01-01 | Stop reason: HOSPADM

## 2020-01-01 RX ORDER — MAGNESIUM SULFATE HEPTAHYDRATE 40 MG/ML
4 INJECTION, SOLUTION INTRAVENOUS EVERY 4 HOURS PRN
Status: DISCONTINUED | OUTPATIENT
Start: 2020-01-01 | End: 2020-01-01 | Stop reason: HOSPADM

## 2020-01-01 RX ORDER — ONDANSETRON 2 MG/ML
4 INJECTION INTRAMUSCULAR; INTRAVENOUS EVERY 6 HOURS PRN
Status: DISCONTINUED | OUTPATIENT
Start: 2020-01-01 | End: 2020-01-01 | Stop reason: HOSPADM

## 2020-01-01 RX ORDER — LORAZEPAM 0.5 MG/1
0.5 TABLET ORAL EVERY 4 HOURS PRN
Qty: 30 TABLET | Refills: 1 | Status: SHIPPED | OUTPATIENT
Start: 2020-01-01

## 2020-01-01 RX ORDER — ASPIRIN 81 MG/1
81 TABLET ORAL DAILY
Status: DISCONTINUED | OUTPATIENT
Start: 2020-01-01 | End: 2020-01-01 | Stop reason: HOSPADM

## 2020-01-01 RX ORDER — POTASSIUM CHLORIDE 750 MG/1
TABLET, EXTENDED RELEASE ORAL
Qty: 45 TABLET | Refills: 1 | Status: SHIPPED | OUTPATIENT
Start: 2020-01-01

## 2020-01-01 RX ORDER — LIDOCAINE HYDROCHLORIDE 20 MG/ML
JELLY TOPICAL ONCE
Status: COMPLETED | OUTPATIENT
Start: 2020-01-01 | End: 2020-01-01

## 2020-01-01 RX ORDER — ONDANSETRON 4 MG/1
4 TABLET, ORALLY DISINTEGRATING ORAL EVERY 6 HOURS PRN
Status: DISCONTINUED | OUTPATIENT
Start: 2020-01-01 | End: 2020-01-01 | Stop reason: HOSPADM

## 2020-01-01 RX ORDER — SODIUM CHLORIDE 9 MG/ML
INJECTION, SOLUTION INTRAVENOUS CONTINUOUS
Status: DISCONTINUED | OUTPATIENT
Start: 2020-01-01 | End: 2020-01-01 | Stop reason: HOSPADM

## 2020-01-01 RX ORDER — MEMANTINE HYDROCHLORIDE 10 MG/1
10 TABLET ORAL 2 TIMES DAILY
Status: DISCONTINUED | OUTPATIENT
Start: 2020-01-01 | End: 2020-01-01 | Stop reason: HOSPADM

## 2020-01-01 RX ORDER — ACETAMINOPHEN 325 MG/1
650 TABLET ORAL EVERY 4 HOURS PRN
Qty: 100 TABLET | Refills: 1 | Status: SHIPPED | OUTPATIENT
Start: 2020-01-01

## 2020-01-01 RX ORDER — NALOXONE HYDROCHLORIDE 0.4 MG/ML
.1-.4 INJECTION, SOLUTION INTRAMUSCULAR; INTRAVENOUS; SUBCUTANEOUS
Status: DISCONTINUED | OUTPATIENT
Start: 2020-01-01 | End: 2020-01-01 | Stop reason: HOSPADM

## 2020-01-01 RX ORDER — HYDROMORPHONE HYDROCHLORIDE 1 MG/ML
2 SOLUTION ORAL
Qty: 30 ML | Refills: 0 | Status: SHIPPED | OUTPATIENT
Start: 2020-01-01

## 2020-01-01 RX ORDER — METOCLOPRAMIDE 5 MG/1
5 TABLET ORAL EVERY 6 HOURS PRN
Status: DISCONTINUED | OUTPATIENT
Start: 2020-01-01 | End: 2020-01-01 | Stop reason: HOSPADM

## 2020-01-01 RX ORDER — BISACODYL 10 MG
SUPPOSITORY, RECTAL RECTAL
Qty: 12 SUPPOSITORY | Refills: 1 | Status: SHIPPED | OUTPATIENT
Start: 2020-01-01

## 2020-01-01 RX ORDER — ACETAMINOPHEN 650 MG/1
650 SUPPOSITORY RECTAL EVERY 4 HOURS PRN
Qty: 12 SUPPOSITORY | Refills: 1 | Status: SHIPPED | OUTPATIENT
Start: 2020-01-01

## 2020-01-01 RX ADMIN — LIDOCAINE HYDROCHLORIDE 10 ML: 10 INJECTION, SOLUTION EPIDURAL; INFILTRATION; INTRACAUDAL; PERINEURAL at 14:50

## 2020-01-01 RX ADMIN — SODIUM CHLORIDE 500 ML: 9 INJECTION, SOLUTION INTRAVENOUS at 14:40

## 2020-01-01 RX ADMIN — SODIUM CHLORIDE 1000 ML: 9 INJECTION, SOLUTION INTRAVENOUS at 20:58

## 2020-01-01 RX ADMIN — ENOXAPARIN SODIUM 40 MG: 40 INJECTION SUBCUTANEOUS at 22:26

## 2020-01-01 RX ADMIN — PIPERACILLIN AND TAZOBACTAM 3.38 G: 3; .375 INJECTION, POWDER, FOR SOLUTION INTRAVENOUS at 03:50

## 2020-01-01 RX ADMIN — PIPERACILLIN SODIUM AND TAZOBACTAM SODIUM 3.38 G: 3; .375 INJECTION, POWDER, LYOPHILIZED, FOR SOLUTION INTRAVENOUS at 15:55

## 2020-01-01 RX ADMIN — MEMANTINE 10 MG: 10 TABLET ORAL at 10:02

## 2020-01-01 RX ADMIN — SODIUM CHLORIDE: 9 INJECTION, SOLUTION INTRAVENOUS at 20:57

## 2020-01-01 RX ADMIN — ENOXAPARIN SODIUM 40 MG: 40 INJECTION SUBCUTANEOUS at 20:56

## 2020-01-01 RX ADMIN — METOPROLOL SUCCINATE 12.5 MG: 25 TABLET, EXTENDED RELEASE ORAL at 20:46

## 2020-01-01 RX ADMIN — OXYBUTYNIN CHLORIDE 5 MG: 5 TABLET ORAL at 20:46

## 2020-01-01 RX ADMIN — PIPERACILLIN AND TAZOBACTAM 3.38 G: 3; .375 INJECTION, POWDER, FOR SOLUTION INTRAVENOUS at 12:22

## 2020-01-01 RX ADMIN — SODIUM CHLORIDE: 9 INJECTION, SOLUTION INTRAVENOUS at 18:14

## 2020-01-01 RX ADMIN — PIPERACILLIN AND TAZOBACTAM 3.38 G: 3; .375 INJECTION, POWDER, FOR SOLUTION INTRAVENOUS at 22:56

## 2020-01-01 RX ADMIN — PIPERACILLIN AND TAZOBACTAM 3.38 G: 3; .375 INJECTION, POWDER, FOR SOLUTION INTRAVENOUS at 16:58

## 2020-01-01 RX ADMIN — OXYBUTYNIN CHLORIDE 5 MG: 5 TABLET ORAL at 10:02

## 2020-01-01 RX ADMIN — LIDOCAINE HYDROCHLORIDE: 20 JELLY TOPICAL at 23:04

## 2020-01-01 RX ADMIN — SODIUM CHLORIDE: 9 INJECTION, SOLUTION INTRAVENOUS at 12:03

## 2020-01-01 RX ADMIN — ASPIRIN 81 MG: 81 TABLET, DELAYED RELEASE ORAL at 10:02

## 2020-01-01 RX ADMIN — SODIUM CHLORIDE: 9 INJECTION, SOLUTION INTRAVENOUS at 22:55

## 2020-01-01 RX ADMIN — VANCOMYCIN HYDROCHLORIDE 1500 MG: 5 INJECTION, POWDER, LYOPHILIZED, FOR SOLUTION INTRAVENOUS at 03:39

## 2020-01-01 RX ADMIN — PIPERACILLIN AND TAZOBACTAM 3.38 G: 3; .375 INJECTION, POWDER, FOR SOLUTION INTRAVENOUS at 10:48

## 2020-01-01 RX ADMIN — PIPERACILLIN AND TAZOBACTAM 3.38 G: 3; .375 INJECTION, POWDER, FOR SOLUTION INTRAVENOUS at 22:26

## 2020-01-01 RX ADMIN — PIPERACILLIN AND TAZOBACTAM 3.38 G: 3; .375 INJECTION, POWDER, FOR SOLUTION INTRAVENOUS at 06:04

## 2020-01-01 ASSESSMENT — ACTIVITIES OF DAILY LIVING (ADL)
ADLS_ACUITY_SCORE: 27
SWALLOWING: 2-->DIFFICULTY SWALLOWING LIQUIDS
BATHING: 4-->COMPLETELY DEPENDENT
ADLS_ACUITY_SCORE: 27
NUMBER_OF_TIMES_PATIENT_HAS_FALLEN_WITHIN_LAST_SIX_MONTHS: 2
ADLS_ACUITY_SCORE: 45
ADLS_ACUITY_SCORE: 32
ADLS_ACUITY_SCORE: 27
ADLS_ACUITY_SCORE: 29
FALL_HISTORY_WITHIN_LAST_SIX_MONTHS: YES
ADLS_ACUITY_SCORE: 27
RETIRED_EATING: 4-->COMPLETELY DEPENDENT
COGNITION: 2 - DIFFICULTY WITH ORGANIZING THOUGHTS
ADLS_ACUITY_SCORE: 27
ADLS_ACUITY_SCORE: 30
RETIRED_COMMUNICATION: 2-->DIFFICULTY UNDERSTANDING AND SPEAKING (NOT RELATED TO LANGUAGE BARRIER)
DRESS: 4-->COMPLETELY DEPENDENT
TOILETING: 4-->COMPLETELY DEPENDENT
ADLS_ACUITY_SCORE: 30
ADLS_ACUITY_SCORE: 27
TRANSFERRING: 4-->COMPLETELY DEPENDENT
AMBULATION: 4-->COMPLETELY DEPENDENT
WHICH_OF_THE_ABOVE_FUNCTIONAL_RISKS_HAD_A_RECENT_ONSET_OR_CHANGE?: AMBULATION;TRANSFERRING;TOILETING;BATHING;DRESSING;EATING;SWALLOWING;COGNITION;COMMUNICATION/SPEECH
ADLS_ACUITY_SCORE: 30

## 2020-01-01 ASSESSMENT — MIFFLIN-ST. JEOR
SCORE: 1483.65
SCORE: 1420.6
SCORE: 1411.08
SCORE: 1458
SCORE: 1397.47

## 2020-01-01 ASSESSMENT — PATIENT HEALTH QUESTIONNAIRE - PHQ9
SUM OF ALL RESPONSES TO PHQ QUESTIONS 1-9: 12
SUM OF ALL RESPONSES TO PHQ QUESTIONS 1-9: 12

## 2020-03-02 NOTE — PROGRESS NOTES
Subjective     Sylvester Davis is a 88 year old male who presents to clinic today for the following health issues:    HPI   Nasal Bleeding      Duration: 2 weeks    Description (location/character/radiation):  Happens only when he eats that he gets a runny nose and every once in a while they will be a little blood-tinged to the discharge.  No conservative treatments have been tried.    Intensity:  mild    Accompanying signs and symptoms: occasionally happens    History (similar episodes/previous evaluation): None    Precipitating or alleviating factors: None    Therapies tried and outcome: None         Patient Active Problem List   Diagnosis     History of prostate cancer     Short-term memory loss     Atrial fibrillation (H)     Dementia (H)     Mixed hyperlipidemia     Anemia     Cerebral artery occlusion with cerebral infarction (H)     Aortic valve disorder     Mitral regurgitation     LVH (left ventricular hypertrophy)     Intracranial bleed (H)     Sick sinus syndrome (H)     Atrial flutter (H)     Cardiomyopathy (H)     Essential hypertension     CKD (chronic kidney disease) stage 3, GFR 30-59 ml/min (H)     Chronic systolic heart failure (H)     Anemia in chronic kidney disease, unspecified CKD stage     Alcohol dependence (H)     Diarrhea     Dizziness and giddiness     Dysthymic disorder     Edema     Elevated prostate specific antigen (PSA)     Hyposmolality     Malignant neoplasm of prostate (H)     Other specified transient cerebral ischemias     Other stiff joint, of the upper arm     Other tenosynovitis of hand and wrist     Primary localized osteoarthrosis, lower leg     Primary localized osteoarthrosis, upper arm     Primary open angle glaucoma     Pseudophakia, left eye     Pseudophakia, right eye     Senile dementia with delirium (H)     Trigger finger, acquired     Type II diabetes mellitus, uncontrolled (H)     Urinary frequency     Vascular dementia, uncomplicated (H)     Chronic atrial  "fibrillation     Type 2 diabetes mellitus with diabetic nephropathy, without long-term current use of insulin (H)     Aortic valve stenosis, etiology of cardiac valve disease unspecified     Overweight (BMI 25.0-29.9)     Alcohol abuse-resolved post CVA in 2010     Past Surgical History:   Procedure Laterality Date     GENITOURINARY SURGERY  2010    Resection of prostate     HEAD & NECK SURGERY  2009    Brain swelling - stopped coumadin but kept on aspirin per cardiology     wisdom teeth removal         Social History     Tobacco Use     Smoking status: Former Smoker     Packs/day: 1.00     Years: 10.00     Pack years: 10.00     Types: Cigarettes     Smokeless tobacco: Never Used   Substance Use Topics     Alcohol use: No     Family History   Problem Relation Age of Onset     Cancer Mother              Reviewed and updated as needed this visit by Provider         Review of Systems   ROS COMP: Constitutional, HEENT, cardiovascular, pulmonary, gi and gu systems are negative, except as otherwise noted.      Objective    /74   Pulse 100   Temp 97  F (36.1  C) (Tympanic)   Resp 16   Ht 1.727 m (5' 8\")   Wt 75.3 kg (166 lb)   SpO2 98%   BMI 25.24 kg/m    Body mass index is 25.24 kg/m .  Physical Exam   GENERAL APPEARANCE: healthy, alert and no distress  HENT: Nasal exam did not show any bleeding sites.            Assessment & Plan     No diagnosis found.       Patient Instructions   I had a discussion with the son who was with his father today about the issue.  Given the fact that this does not happen every single time and there is not a large amount of blood, we opted to have him try just blowing his nose before he eats.  He did have one blood clot that came out when he blew his nose one time but no further bleeding either before or after that.  I also suggested that if this continues to be an issue he may need referral to ENT because anteriorly I could not see any sites of bleeding in either nostril.  We " briefly discussed that there are some nasal sprays we could use, however, given the patient's mental status with his dementia that would be probably somewhat problematic to administer.      Return in about 2 weeks (around 3/16/2020) for If symptoms persist.    Manuelito Ansari MD  [facility]

## 2020-03-02 NOTE — PATIENT INSTRUCTIONS
I had a discussion with the son who was with his father today about the issue.  Given the fact that this does not happen every single time and there is not a large amount of blood, we opted to have him try just blowing his nose before he eats.  He did have one blood clot that came out when he blew his nose one time but no further bleeding either before or after that.  I also suggested that if this continues to be an issue he may need referral to ENT because anteriorly I could not see any sites of bleeding in either nostril.  We briefly discussed that there are some nasal sprays we could use, however, given the patient's mental status with his dementia that would be probably somewhat problematic to administer.

## 2020-03-04 NOTE — TELEPHONE ENCOUNTER
Routing refill request to provider for review/approval because:  Cr out of range, provider approval needed.

## 2020-03-04 NOTE — TELEPHONE ENCOUNTER
"Requested Prescriptions   Pending Prescriptions Disp Refills     torsemide (DEMADEX) 10 MG tablet [Pharmacy Med Name: TORSEMIDE 10MG TABLETS] 90 tablet 1     Sig: TAKE 1 TABLET BY MOUTH DAILY     Last Written Prescription Date:  8/28/2019  Last Fill Quantity: 90,  # refills: 1   Last office visit: 11/8/2019 with prescribing provider:  11/8/2019   Future Office Visit:            Diuretics (Including Combos) Protocol Failed - 3/4/2020  3:57 AM        Failed - Normal serum creatinine on file in past 12 months     Recent Labs   Lab Test 08/19/19  1441   CR 1.26*              Passed - Blood pressure under 140/90 in past 12 months     BP Readings from Last 3 Encounters:   03/02/20 124/74   11/08/19 122/68   10/14/19 138/60                 Passed - Recent (12 mo) or future (30 days) visit within the authorizing provider's specialty     Patient has had an office visit with the authorizing provider or a provider within the authorizing providers department within the previous 12 mos or has a future within next 30 days. See \"Patient Info\" tab in inbasket, or \"Choose Columns\" in Meds & Orders section of the refill encounter.              Passed - Medication is active on med list        Passed - Patient is age 18 or older        Passed - Normal serum potassium on file in past 12 months     Recent Labs   Lab Test 08/19/19  1441   POTASSIUM 4.1                    Passed - Normal serum sodium on file in past 12 months     Recent Labs   Lab Test 08/19/19  1441                   "

## 2020-03-23 NOTE — TELEPHONE ENCOUNTER
Reason for call:  Patient reporting a symptom    Symptom or request: rash    Duration (how long have symptoms been present): unknown    Have you been treated for this before? No    Additional comments: Son Bj called.  Says rash is from new medication Losartan for leg swelling.  He stopped taking medication and rash went away.  Wants a different medication for leg swelling.  [phone].    Phone Number patient can be reached at:  Home number on file [phone] (home)    Best Time:  any    Can we leave a detailed message on this number:  YES    Call taken on 3/23/2020 at 1:59 PM by CHRISTA TORO

## 2020-03-23 NOTE — TELEPHONE ENCOUNTER
Pt son states pt had a rash with Losartan for why he had him stop medication. Son denied anaphylactic symptoms but notes leg edema. SOB is noted with activity. Per quan review, losartan was discontinued 10/14/2019. Son states PCP had renewed at last OV. Pt is taking metoprolol and torsemide. BP last Saturday was 157/86 pulse 55. Son agreed to check BP today. He will call triage back with updated readings.

## 2020-03-23 NOTE — PROGRESS NOTES
"Sylvester Davis is a 88 year old male who is being evaluated via a billable telephone visit.      The patient has been notified of following:     \"This telephone visit will be conducted via a call between you and your physician/provider. We have found that certain health care needs can be provided without the need for a physical exam.  This service lets us provide the care you need with a short phone conversation.  If a prescription is necessary we can send it directly to your pharmacy.  If lab work is needed we can place an order for that and you can then stop by our lab to have the test done at a later time.    If during the course of the call the physician/provider feels a telephone visit is not appropriate, you will not be charged for this service.\"     Sylvester Davis complains of    Chief Complaint   Patient presents with     Leg Swelling   4 days. Stopped taking Losartan a few days ago due to breaking out in a rash.  BP has been high.  This telephone encounter was with the patient's son, Bj.  His dad has significant dementia.  Bj was told by someone in the past, he thinks at [facility], that metoprolol could make his dad's legs swell.  He is already on torsemide for the leg swelling.  He has been taken off of metoprolol by the son.  He also stopped the losartan because he was breaking out in a rash.  He has been trying to taper the oxybutynin, I am not sure why, as he says he thinks I told him that that would be a good idea.  Losartan is not on patient med list.  Bj's wife has been taking his dad's blood pressure.  That has been running in the 160-170 systolic range and in the mid 80s on the diastolic end.  I have reviewed and updated the patient's Past Medical History, Social History, Family History and Medication List.    ALLERGIES  Fish allergy and Coumadin    Hypertension Follow-up      Do you check your blood pressure regularly outside of the clinic? Yes     Are you following a " low salt diet? Yes    Are your blood pressures ever more than 140 on the top number (systolic) OR more   than 90 on the bottom number (diastolic), for example 140/90? Yes    Additional provider notes: edema    Assessment/Plan:  1. Essential hypertension      2. Type 2 diabetes mellitus with diabetic nephropathy, without long-term current use of insulin (H)      3. Alzheimer's dementia without behavioral disturbance, unspecified timing of dementia onset (H)      4. CKD (chronic kidney disease) stage 3, GFR 30-59 ml/min (H)    I will have the patient go back on 1/2 tablet of metoprolol daily.  He will continue with his torsemide on a daily basis.  He will continue on oxybutynin twice daily.  They will check his blood pressure every 1 to 2 days and then call us in a couple of weeks with what his blood pressures are doing.  I did explain to Bj that his dad would be due for some blood tests in April but we could do that as a lab only appointment.  We will first get his blood pressure down to where it should be.    Phone call duration:  22 minutes    Manuelito Ansari MD

## 2020-03-23 NOTE — TELEPHONE ENCOUNTER
Patient's son returning call. He updated blood pressure for today: 168/92 pulse 53    Son states that patient was off metoprolol because someone told him that metoprolol would add to water weight in his feet. He is unsure who told him but it was someone at [facility]. He is unsure what medications he should be taking at this time.     He also mentions patient is having swelling in his legs. Recommended telephone visit with PCP to address. Scheduled 3/23. Routing to provider as FYI.

## 2020-04-08 NOTE — TELEPHONE ENCOUNTER
Patients son called reporting bilateral leg swelling. Pt had phone visit 03/23/2020 and was advised to follow up with PCP with BP update. Please see BP reading report below. Son reports he has SOB while moving pt has hx of heart valve problem. He will also let cardiology know of symptoms. Pt has been elevating legs,  taking 1/2 tablet of metoprolol daily and torsemide daily. Please advice if medication dose adjustment or phone visit needed.     Blood pressure   Pulse  150/94                 53  157/86                 55  144/77                 51    Additional Information    Negative: Chest pain    Negative: Small area of swelling and followed an insect bite to the area    Negative: Followed a knee injury    Negative: Ankle or foot injury    Negative: Pregnant with leg swelling or edema    Negative: Sounds like a life-threatening emergency to the triager    Negative: Difficulty breathing at rest    Negative: Entire foot is cool or blue in comparison to other side    Negative: SEVERE swelling (e.g., swelling extends above knee, entire leg is swollen, weeping fluid)    Negative: Thigh or calf pain and only 1 side and present > 1 hour    Negative: Thigh, calf, or ankle swelling in only one leg    Negative: Thigh, calf, or ankle swelling in both legs, but one side is definitely more swollen    Negative: Cast on leg or ankle and has increasing pain    Negative: Can't walk or can barely stand (new onset)    Negative: Fever and red area (or area very tender to touch)    Negative: Patient sounds very sick or weak to the triager    Negative: Swelling of face, arm or hands (Exception: slight puffiness of fingers during hot weather)    Negative: Pregnant > 20 weeks and sudden weight gain (i.e., > 2 lbs, 1 kg in one week)    Difficulty breathing with exertion AND worsening or new onset    Negative: MODERATE swelling of both ankles (e.g., swelling extends up to the knees) AND new onset or worsening    Protocols used: LEG  SWELLING AND EDEMA-A-OH

## 2020-04-08 NOTE — TELEPHONE ENCOUNTER
Reason for Call:  Other call back    Detailed comments: The patients son called and stated that his father was previously on a medication for swollen feet but he was told to stop taking it as it caused him to break out. His fathers feet are still swollen and he would like to know if Dr. Ansari would want to have him try taking another medication for this issue. He would like a call back to discuss this     Phone Number Patient can be reached at: Home number on file [phone] (home)    Best Time: Any    Can we leave a detailed message on this number? YES    Call taken on 4/8/2020 at 2:42 PM by Malia Pereira

## 2020-04-08 NOTE — TELEPHONE ENCOUNTER
"Call attempted. Unable to leave message. \"recording memory is full enter remote access code\".   "

## 2020-04-09 NOTE — TELEPHONE ENCOUNTER
Call back to patient and patient son.     PCP message was given.     There was significant relief of swelling in the last 12 hours. Does plan to follow up with cardiology.

## 2020-04-16 PROBLEM — I35.0 SEVERE AORTIC STENOSIS: Status: ACTIVE | Noted: 2020-01-01

## 2020-04-16 PROBLEM — K62.5 RECTAL BLEEDING: Status: ACTIVE | Noted: 2020-01-01

## 2020-04-16 NOTE — LETTER
April 17, 2020      Sylvester Davis  [address] [place] [address]        Dear ,    We are writing to inform you of your test results.             As we discussed on the phone, your iron level is good and you do not need to take any iron.  Your hemoglobin is stable.                                                                                                  You should cut back the digoxin to every other day.                                                                 You should take a potassium tablet on the days when you take the extra torsemide, which would be one potassium tablet every other day.                                                                     Try to increase your protein intake.   This would include meat, cheese, eggs, etc.                I have enclosed a new medication list.      Recent Results (from the past 96 hour(s))   Comprehensive metabolic panel (BMP + Alb, Alk Phos, ALT, AST, Total. Bili, TP)    Collection Time: 04/16/20 11:50 AM   Result Value Ref Range    Sodium 136 133 - 144 mmol/L    Potassium 4.0 3.4 - 5.3 mmol/L    Chloride 102 94 - 109 mmol/L    Carbon Dioxide 27 20 - 32 mmol/L    Anion Gap 7 3 - 14 mmol/L    Glucose 130 (H) 70 - 99 mg/dL    Urea Nitrogen 16 7 - 30 mg/dL    Creatinine 1.11 0.66 - 1.25 mg/dL    GFR Estimate 59 (L) >60 mL/min/[1.73_m2]    GFR Estimate If Black 68 >60 mL/min/[1.73_m2]    Calcium 8.8 8.5 - 10.1 mg/dL    Bilirubin Total 2.0 (H) 0.2 - 1.3 mg/dL    Albumin 2.6 (L) 3.4 - 5.0 g/dL    Protein Total 7.9 6.8 - 8.8 g/dL    Alkaline Phosphatase 115 40 - 150 U/L    ALT 17 0 - 70 U/L    AST 24 0 - 45 U/L   Ferritin    Collection Time: 04/16/20 11:50 AM   Result Value Ref Range    Ferritin 367 26 - 388 ng/mL   Vitamin B12    Collection Time: 04/16/20 11:50 AM   Result Value Ref Range    Vitamin B12 842 193 - 986 pg/mL   Hemoglobin A1c    Collection Time: 04/16/20 11:50 AM   Result Value Ref Range    Hemoglobin  A1C 5.3 0 - 5.6 %   Digoxin level    Collection Time: 04/16/20 11:50 AM   Result Value Ref Range    Digoxin Level 1.7 0.5 - 2.0 ug/L   CBC with platelets and differential    Collection Time: 04/16/20 11:50 AM   Result Value Ref Range    WBC 10.2 4.0 - 11.0 10e9/L    RBC Count 4.24 (L) 4.4 - 5.9 10e12/L    Hemoglobin 11.5 (L) 13.3 - 17.7 g/dL    Hematocrit 38.3 (L) 40.0 - 53.0 %    MCV 90 78 - 100 fl    MCH 27.1 26.5 - 33.0 pg    MCHC 30.0 (L) 31.5 - 36.5 g/dL    RDW 19.0 (H) 10.0 - 15.0 %    Platelet Count 181 150 - 450 10e9/L    Diff Method Automated Method     % Neutrophils 81.0 %    % Lymphocytes 10.3 %    % Monocytes 7.2 %    % Eosinophils 1.3 %    % Basophils 0.2 %    Absolute Neutrophil 8.3 1.6 - 8.3 10e9/L    Absolute Lymphocytes 1.1 0.8 - 5.3 10e9/L    Absolute Monocytes 0.7 0.0 - 1.3 10e9/L    Absolute Eosinophils 0.1 0.0 - 0.7 10e9/L    Absolute Basophils 0.0 0.0 - 0.2 10e9/L         If you have any questions or concerns, please call the clinic at the number listed above.       Sincerely,        Bj Carter MD

## 2020-04-16 NOTE — TELEPHONE ENCOUNTER
Call from patient's son: he has been working with the primary provider to manage the patient's peripheral edema and has questions.     Per PCP notes 3/23/2020: losartan was stopped due to a rash concern. The patient's son also stopped the metoprolol on his own. The PCP restarted the metoprolol at that visit.  The son called 4/9/2020 concerned about edema in the patient's feet and legs. Additional torsemide dose was advised for 2 days. The son gave the patient 5mg for 2 evening dose and states there was improvement.    Today he calls noting that the edema is creeping back, so far just to the ankles. He does not weight the patient at home. Son asks what cardiology would do. He was advised to call cardiology by PCP staff.  Reviewed with patient that edema may be a sign of overall fluid excess or could be venous insufficiency. Patient would need to do some lab work and be assessed by KAREN to determine best treatment options. Reviewed how to help patient elevate legs for 20 min BID. They do not currently use compression stockings.    Offered to have patient set up virtual visit with KAREN Herberth Millard next week. Son states they have a telephone visit today with the PCP and will see what he recommends. They will call back if they decides to have a cardiology work up.

## 2020-04-16 NOTE — PATIENT INSTRUCTIONS
As we discussed on the phone, if Sylvester Davis has further significant rectal bleeding, then he needs emergency room evaluation.   In the meantime, skip the aspirin for the next 2 days.                    Regarding his leg edema, you can alternate 15 mg with 10 mg torsemide.         Today's labs will check the potassium level and the kidney function.

## 2020-04-16 NOTE — PROGRESS NOTES
"Sylvester Davis is a 88 year old male who is being evaluated via a billable telephone visit.      The patient has been notified of following:     \"This telephone visit will be conducted via a call between you and your physician/provider. We have found that certain health care needs can be provided without the need for a physical exam.  This service lets us provide the care you need with a short phone conversation.  If a prescription is necessary we can send it directly to your pharmacy.  If lab work is needed we can place an order for that and you can then stop by our lab to have the test done at a later time.    Telephone visits are billed at different rates depending on your insurance coverage. During this emergency period, for some insurers they may be billed the same as an in-person visit.  Please reach out to your insurance provider with any questions.    If during the course of the call the physician/provider feels a telephone visit is not appropriate, you will not be charged for this service.\"    Patient has given verbal consent for Telephone visit?  Yes    How would you like to obtain your AVS? Mail a copy    Subjective     Sylvester Davis is a 88 year old male who presents to clinic today for the following health issues:    Medication Follow-Up of Torsemide      Taking Medication as prescribed: yes    Side Effects:  Blood in Stool    Medication Helping Symptoms:  yes                 This phone call was primarily with the patient's son, Bj Davis.        His father lives with him.                  Last evening, the patient had rectal bleeding.  His son reports there was blood in the toilet bowl and in the stool in the toilet bowl.             This patient wears a pad.  His son  checked the pad this morning and there is no further bleeding noted.             No history of GI bleeds apparently.   His baseline hemoglobin is 11+.      He does have a long complicated medical history.  He does " take aspirin 81 mg/day.         He has severe aortic stenosis, and TAVR has been declined according to cardiology notes.            His last echocardiogram in December 2018 showed severe aortic stenosis, with ejection fraction estimated at 45%.  There was advanced diastolic dysfunction.  There was global LV hypokinesis.          He also has a history of paroxysmal atrial flutter.  He also has a history of intracerebral bleeding, therefore he is not anticoagulated.              The son also reports that patient is developing more peripheral edema.  He normally takes torsemide 10 mg/day.       After a phone call on April 8    he took an extra 5 mg torsemide for 2 days with improvement.                He is due for lab work.                               He also has a history of dementia.                   Patient Active Problem List   Diagnosis     History of prostate cancer     Short-term memory loss     Atrial fibrillation (H)     Dementia (H)     Mixed hyperlipidemia     Anemia     Cerebral artery occlusion with cerebral infarction (H)     Aortic valve disorder     Mitral regurgitation     LVH (left ventricular hypertrophy)     Intracranial bleed (H)     Sick sinus syndrome (H)     Atrial flutter (H)     Cardiomyopathy (H)     Essential hypertension     CKD (chronic kidney disease) stage 3, GFR 30-59 ml/min (H)     Chronic systolic heart failure (H)     Anemia in chronic kidney disease, unspecified CKD stage     Alcohol dependence (H)     Diarrhea     Dizziness and giddiness     Dysthymic disorder     Edema     Elevated prostate specific antigen (PSA)     Hyposmolality     Malignant neoplasm of prostate (H)     Other specified transient cerebral ischemias     Other stiff joint, of the upper arm     Other tenosynovitis of hand and wrist     Primary localized osteoarthrosis, lower leg     Primary localized osteoarthrosis, upper arm     Primary open angle glaucoma     Pseudophakia, left eye     Pseudophakia, right eye      Senile dementia with delirium (H)     Trigger finger, acquired     Type II diabetes mellitus, uncontrolled (H)     Urinary frequency     Vascular dementia, uncomplicated (H)     Chronic atrial fibrillation     Type 2 diabetes mellitus with diabetic nephropathy, without long-term current use of insulin (H)     Aortic valve stenosis, etiology of cardiac valve disease unspecified     Overweight (BMI 25.0-29.9)     Alcohol abuse-resolved post CVA in 2010     Past Surgical History:   Procedure Laterality Date     GENITOURINARY SURGERY  2010    Resection of prostate     HEAD & NECK SURGERY  2009    Brain swelling - stopped coumadin but kept on aspirin per cardiology     wisdom teeth removal         Social History     Tobacco Use     Smoking status: Former Smoker     Packs/day: 1.00     Years: 10.00     Pack years: 10.00     Types: Cigarettes     Smokeless tobacco: Never Used   Substance Use Topics     Alcohol use: No     Family History   Problem Relation Age of Onset     Cancer Mother      No Known Problems Father          Current Outpatient Medications   Medication Sig Dispense Refill     aspirin 81 MG tablet Take 81 mg by mouth daily.       digoxin (LANOXIN) 125 MCG tablet TAKE 1 TABLET BY MOUTH DAILY 90 tablet 3     memantine (NAMENDA) 5 (28)-10 (21) MG tablet Take 10 mg twice daily 180 tablet 3     metoprolol succinate ER (TOPROL-XL) 25 MG 24 hr tablet TAKE 1/2 TABLET BY MOUTH DAILY 45 tablet 3     oxybutynin (DITROPAN) 5 MG tablet Take 1 tablet (5 mg) by mouth 2 times daily 180 tablet 3     simvastatin (ZOCOR) 20 MG tablet TAKE 1 TABLET BY MOUTH AT BEDTIME 90 tablet 2     tamsulosin (FLOMAX) 0.4 MG capsule TAKE 1 CAPSULE BY MOUTH EVERY DAY 90 capsule 3     torsemide (DEMADEX) 10 MG tablet TAKE 1 TABLET BY MOUTH DAILY 90 tablet 1     Allergies   Allergen Reactions     Fish      Other reaction(s): Edema, Throat Swelling/Closing  Any cold water fish causes allergic reaction.  Cannot tolerate lobster.  Shrimp is OK.      Fish Allergy      Shellfish-Derived Products Difficulty breathing     Coumadin      Warfarin      Other reaction(s): Bleeding     Recent Labs   Lab Test 10/14/19  1448 08/19/19  1441 12/17/18  1311 07/24/18  1241  10/07/17  1116   A1C 5.3 7.6*  --  5.4  --   --    LDL 50 29  --  35  --  47   HDL 36* 27*  --  35*  --  40   TRIG 166* 116  --  169*  --  169*   ALT  --  15  --  14  --  16   CR  --  1.26* 1.29  --    < > 1.26*   GFRESTIMATED  --  51* 53*  --    < > 54*   GFRESTBLACK  --  59* 64  --    < > 66   POTASSIUM  --  4.1 3.9  --    < > 4.3   TSH 2.42 3.03  --  2.09  --   --     < > = values in this interval not displayed.      BP Readings from Last 3 Encounters:   03/02/20 124/74   11/08/19 122/68   10/14/19 138/60    Wt Readings from Last 3 Encounters:   03/02/20 75.3 kg (166 lb)   11/08/19 77.7 kg (171 lb 4.8 oz)   10/14/19 77.6 kg (171 lb)                    Reviewed and updated as needed this visit by Provider         Review of Systems   ROS COMP: CONSTITUTIONAL:NEGATIVE  for chills and fever   RESP:NEGATIVE for cough-non productive and cough-productive  CV: NEGATIVE for chest pain/chest pressure  MUSCULOSKELETAL: POSITIVE  for arthralgias knees, and uses a walker  NEURO: memory problems       Objective   Reported vitals:  There were no vitals taken for this visit.         Remainder of exam unable to be completed due to telephone visits    Diagnostic Test Results:  To be ordered        Assessment/Plan:  1. Rectal bleeding     Etiology and severity uncertain.           Discussed with patient's son at length.     Certainly if he has further rectal bleeding of the magnitude described last night, that he needs emergency room evaluation.        In the meantime, he will bring his father into the clinic this morning for lab work to include a stat CBC.       Hold aspirin for 2 days.  - CBC with platelets and differential    2. Chronic systolic heart failure (H)  He can increase the torsemide to 15 mg alternating  with 10 mg.  Lab work needed to include electrolytes and renal function.  - Comprehensive metabolic panel (BMP + Alb, Alk Phos, ALT, AST, Total. Bili, TP)  - Digoxin level    3. Severe aortic stenosis  As per cardiology notes    4. Dementia without behavioral disturbance, unspecified dementia type (H)  Chronic  - Vitamin B12    5. CKD (chronic kidney disease) stage 3, GFR 30-59 ml/min (H)  Chronic  - Comprehensive metabolic panel (BMP + Alb, Alk Phos, ALT, AST, Total. Bili, TP)    6. Anemia due to chronic kidney disease, unspecified CKD stage    - Ferritin  - Vitamin B12  - CBC with platelets and differential    7. Type 2 diabetes mellitus with diabetic nephropathy, without long-term current use of insulin (H)    - Hemoglobin A1c    Patient Instructions    As we discussed on the phone, if Sylvester Davis has further significant rectal bleeding, then he needs emergency room evaluation.   In the meantime, skip the aspirin for the next 2 days.                    Regarding his leg edema, you can alternate 15 mg with 10 mg torsemide.         Today's labs will check the potassium level and the kidney function.                               Phone call duration:  22 minutes    Bj Carter MD    Results for orders placed or performed in visit on 04/16/20   Hemoglobin A1c     Status: None   Result Value Ref Range    Hemoglobin A1C 5.3 0 - 5.6 %   CBC with platelets and differential     Status: Abnormal   Result Value Ref Range    WBC 10.2 4.0 - 11.0 10e9/L    RBC Count 4.24 (L) 4.4 - 5.9 10e12/L    Hemoglobin 11.5 (L) 13.3 - 17.7 g/dL    Hematocrit 38.3 (L) 40.0 - 53.0 %    MCV 90 78 - 100 fl    MCH 27.1 26.5 - 33.0 pg    MCHC 30.0 (L) 31.5 - 36.5 g/dL    RDW 19.0 (H) 10.0 - 15.0 %    Platelet Count 181 150 - 450 10e9/L    Diff Method Automated Method     % Neutrophils 81.0 %    % Lymphocytes 10.3 %    % Monocytes 7.2 %    % Eosinophils 1.3 %    % Basophils 0.2 %    Absolute Neutrophil 8.3 1.6 - 8.3 10e9/L    Absolute  Lymphocytes 1.1 0.8 - 5.3 10e9/L    Absolute Monocytes 0.7 0.0 - 1.3 10e9/L    Absolute Eosinophils 0.1 0.0 - 0.7 10e9/L    Absolute Basophils 0.0 0.0 - 0.2 10e9/L     Addendum:        Phone: no more bleeding.                  hgb stable.            RTP's son.                                                                                                                                                                                                                                                                   Results for orders placed or performed in visit on 04/16/20   Comprehensive metabolic panel (BMP + Alb, Alk Phos, ALT, AST, Total. Bili, TP)     Status: Abnormal   Result Value Ref Range    Sodium 136 133 - 144 mmol/L    Potassium 4.0 3.4 - 5.3 mmol/L    Chloride 102 94 - 109 mmol/L    Carbon Dioxide 27 20 - 32 mmol/L    Anion Gap 7 3 - 14 mmol/L    Glucose 130 (H) 70 - 99 mg/dL    Urea Nitrogen 16 7 - 30 mg/dL    Creatinine 1.11 0.66 - 1.25 mg/dL    GFR Estimate 59 (L) >60 mL/min/[1.73_m2]    GFR Estimate If Black 68 >60 mL/min/[1.73_m2]    Calcium 8.8 8.5 - 10.1 mg/dL    Bilirubin Total 2.0 (H) 0.2 - 1.3 mg/dL    Albumin 2.6 (L) 3.4 - 5.0 g/dL    Protein Total 7.9 6.8 - 8.8 g/dL    Alkaline Phosphatase 115 40 - 150 U/L    ALT 17 0 - 70 U/L    AST 24 0 - 45 U/L   Ferritin     Status: None   Result Value Ref Range    Ferritin 367 26 - 388 ng/mL   Vitamin B12     Status: None   Result Value Ref Range    Vitamin B12 842 193 - 986 pg/mL   Hemoglobin A1c     Status: None   Result Value Ref Range    Hemoglobin A1C 5.3 0 - 5.6 %   Digoxin level     Status: None   Result Value Ref Range    Digoxin Level 1.7 0.5 - 2.0 ug/L                                                                                                                                                                                                                                                     telephone with the patient's son.  No further rectal  bleeding.           Decrease digoxin to every other day.          Add potassium on days when he takes the extra torsemide.      Increase protein intake.                    Letter sent.  As we discussed on the phone, your iron level is good and you do not need to take any iron.  Your hemoglobin is stable.             You should cut back the digoxin to every other day.        You should take a potassium tablet on the days when you take the extra torsemide, which would be one potassium tablet every other day.          Try to increase your protein intake.   This would include meat, cheese, eggs, etc.

## 2020-05-11 NOTE — PATIENT INSTRUCTIONS
The patient may continue with Mucinex as needed.  He may also try Robitussin-DM.  We did talk about the line of products, Coricidin HBP.  The son will attempt crush the patient's medications and put them in either pudding or applesauce to make them easier to swallow.  He only has a couple of medications that are extended release that might have an effect but I do not think it is a significant 1 at this point.  It is probably more important that he get his medication in then if he gets the extended release form of it.  He has not had any fevers at present.  He will continue with symptomatic treatment.

## 2020-05-11 NOTE — PROGRESS NOTES
"Sylvester Davis is a 88 year old male who is being evaluated via a billable telephone visit.      The patient has been notified of following:     \"This telephone visit will be conducted via a call between you and your physician/provider. We have found that certain health care needs can be provided without the need for a physical exam.  This service lets us provide the care you need with a short phone conversation.  If a prescription is necessary we can send it directly to your pharmacy.  If lab work is needed we can place an order for that and you can then stop by our lab to have the test done at a later time.    Telephone visits are billed at different rates depending on your insurance coverage. During this emergency period, for some insurers they may be billed the same as an in-person visit.  Please reach out to your insurance provider with any questions.    If during the course of the call the physician/provider feels a telephone visit is not appropriate, you will not be charged for this service.\"    Patient has given verbal consent for Telephone visit?  Yes    What phone number would you like to be contacted at?  [phone]    How would you like to obtain your AVS? Mail a copy    Subjective     Sylvester Davis is a 88 year old male who presents to clinic today for the following health issues:  The entirety of the telephone conversation about Mr. Davis was with his son, Bj BROWNE  RESPIRATORY SYMPTOMS      Duration: 2 DAYS    Description  cough and coughing up mucous but doesn't know how to spit out    Severity: moderate    Accompanying signs and symptoms: leg weakness, fatigue    History (predisposing factors):  none    Precipitating or alleviating factors: None    Therapies tried and outcome:  Mucinex DM, vicks vapor rub            Patient Active Problem List   Diagnosis     History of prostate cancer     Short-term memory loss     Atrial fibrillation (H)     Dementia (H)     Mixed " hyperlipidemia     Anemia     Cerebral artery occlusion with cerebral infarction (H)     Aortic valve disorder     Mitral regurgitation     LVH (left ventricular hypertrophy)     Intracranial bleed (H)     Sick sinus syndrome (H)     Atrial flutter (H)     Cardiomyopathy (H)     Essential hypertension     CKD (chronic kidney disease) stage 3, GFR 30-59 ml/min (H)     Chronic systolic heart failure (H)     Anemia in chronic kidney disease, unspecified CKD stage     Alcohol dependence (H)     Diarrhea     Dizziness and giddiness     Dysthymic disorder     Edema     Elevated prostate specific antigen (PSA)     Hyposmolality     Malignant neoplasm of prostate (H)     Other specified transient cerebral ischemias     Other stiff joint, of the upper arm     Other tenosynovitis of hand and wrist     Primary localized osteoarthrosis, lower leg     Primary localized osteoarthrosis, upper arm     Primary open angle glaucoma     Pseudophakia, left eye     Pseudophakia, right eye     Senile dementia with delirium (H)     Trigger finger, acquired     Type II diabetes mellitus, uncontrolled (H)     Urinary frequency     Vascular dementia, uncomplicated (H)     Chronic atrial fibrillation     Type 2 diabetes mellitus with diabetic nephropathy, without long-term current use of insulin (H)     Aortic valve stenosis, etiology of cardiac valve disease unspecified     Overweight (BMI 25.0-29.9)     Alcohol abuse-resolved post CVA in 2010     Rectal bleeding     Severe aortic stenosis     Past Surgical History:   Procedure Laterality Date     GENITOURINARY SURGERY  2010    Resection of prostate     HEAD & NECK SURGERY  2009    Brain swelling - stopped coumadin but kept on aspirin per cardiology     wisdom teeth removal         Social History     Tobacco Use     Smoking status: Former Smoker     Packs/day: 1.00     Years: 10.00     Pack years: 10.00     Types: Cigarettes     Smokeless tobacco: Never Used   Substance Use Topics     Alcohol  use: No     Family History   Problem Relation Age of Onset     Cancer Mother      No Known Problems Father            Reviewed and updated as needed this visit by Provider         Review of Systems   Constitutional, HEENT, cardiovascular, pulmonary, gi and gu systems are negative, except as otherwise noted.       Objective   Reported vitals:  Wt 77.1 kg (170 lb)   BMI 25.85 kg/m     The patient was not involved in the conversation on this telephone visit.  All interaction was with his son, Bj.            Assessment/Plan:  1. Upper respiratory tract infection, unspecified type  The patient may continue with Mucinex as needed.  He may also try Robitussin-DM.  We did talk about the line of products, Coricidin HBP.  The son will attempt crush the patient's medications and put them in either pudding or applesauce to make them easier to swallow.  He only has a couple of medications that are extended release that might have an effect but I do not think it is a significant 1 at this point.  It is probably more important that he get his medication in then if he gets the extended release form of it.  He has not had any fevers at present.  He will continue with symptomatic treatment.      2. Dysphagia, unspecified type  See above    3. Short-term memory loss  See above      No follow-ups on file.      Phone call duration:  12 minutes    Manuelito Ansari MD

## 2020-05-19 PROBLEM — L89.300 PRESSURE INJURY OF BUTTOCK, UNSTAGEABLE (H): Status: ACTIVE | Noted: 2020-01-01

## 2020-05-19 PROBLEM — L97.509 FOOT ULCER (H): Status: ACTIVE | Noted: 2020-01-01

## 2020-05-19 NOTE — PATIENT INSTRUCTIONS
Please keep up the Home health with wound care ordered today as discussed.     Please do the imaging studies given .  Keep up follow up appointments with Cardiology.  ER precautions to be followed if any worsening on the mental status, fever for possible need of IV antibiotics for infection.    ====================================    Patient Education     Treating Pressure Injuries of the Foot    With your healthcare provider s care, hot spots, small cracks, or sores can be treated before they get infected. If infection is already present, your provider will probably prescribe medicine. You may also need surgery if the infection has spread.  Checking your feet  What to look for:    Use a mirror to look at the bottom of your feet each day. By doing so, you can catch small skin changes before they turn into pressure injuries.    Call your healthcare provider if you notice hot spots, red streaks, swelling, or any cracks or sores. Never try to treat corns or calluses yourself.     Check the soles and insides of your shoes before putting them on. Remove any objects, such as mavis.  Improving your overall health  Do your best to control health problems that may affect your feet, such as diabetes and kidney disease. Eat right and exercise. If you are given medicines, take them as directed. If you smoke, stop. Smoking reduces blood flow and slows healing. Limiting alcohol intake may also be helpful.      Cleaning the pressure injuries  To help with healing, your healthcare provider may clear away the thickened skin around the pressure injury. He or she may put medicated ointment or cream on the injury to prevent infection. Sometimes a special dressing is used to help keep the wound dry.     Reducing force  To take pressure off hot spots and ulcers, your healthcare provider may prescribe orthoses. These custom-made shoe inserts absorb or move pressure from problem areas. You may need special shoes or temporary  casts.     Using antibiotics  To control or prevent infection, your healthcare provider may prescribe antibiotics. Take them all, and take them as directed. If you stop using an antibiotic too soon, the infection may come back.     If surgery is needed  You may need surgery if infection enters deep tissues or bone. In such cases, your healthcare provider cleans away the infection while removing as little tissue or bone as possible. You may also be given intravenous (IV) antibiotics to fight the infection.  Date Last Reviewed: 6/1/2016 2000-2019 [employer]. [address] [place] [address], [place] [id number]. All rights reserved. This information is not intended as a substitute for professional medical care. Always follow your healthcare professional's instructions.

## 2020-05-19 NOTE — ASSESSMENT & PLAN NOTE
This is a new problem which patient daughter in law has noticed only today which is deep and 1 cm diameter left side. Patient is demented and most of the time is lying in bed.

## 2020-05-19 NOTE — ASSESSMENT & PLAN NOTE
This is a new problem for the patient symptoms of worsening pedal edema.     Last Echocardiogram in 2018 showing worsening LVEF at 45% worsening from the past. His AoV  area has been 0.8cm2 which has been worsening from the past. Patient has last seen Cardiology PA in 11/2019 which mentions that - patient and family has refused the recommendations of Pacemaker for his bradycardia and TAVR for his AS. He does have contraindication for OAC for his A.Fib given previous Hx of ICH.     Patient is currently on Digoxin ( normal levels in 04/2020 ) , Metoprolol 12.5 mg daily, Torsemide 10 mg alternating with 15 mg daily. Also on ASA and Statin.

## 2020-05-19 NOTE — PROGRESS NOTES
"Sylvester Davis is a 88 year old male who is being evaluated via a billable video visit.      The patient has been notified of following:     \"This video visit will be conducted via a call between you and your physician/provider. We have found that certain health care needs can be provided without the need for an in-person physical exam.  This service lets us provide the care you need with a video conversation.  If a prescription is necessary we can send it directly to your pharmacy.  If lab work is needed we can place an order for that and you can then stop by our lab to have the test done at a later time.    Video visits are billed at different rates depending on your insurance coverage.  Please reach out to your insurance provider with any questions.    If during the course of the call the physician/provider feels a video visit is not appropriate, you will not be charged for this service.\"    Patient has given verbal consent for Video visit? Yes    How would you like to obtain your AVS? Mail a copy    Patient would like the video invitation sent by: Text to cell phone: [phone]    Will anyone else be joining your video visit? No      Subjective     Sylvester Davis is a 88 year old male who presents today via video visit for the following health issues:    HPI  foot      Duration: 2 weeks    Description (location/character/radiation): left foot, discoloration around ankles, and blisters on the back of his foot, one foot looks really dark in color, pt is bedridden for about 1 week now    Intensity:  mild, moderate    Accompanying signs and symptoms: see above    History (similar episodes/previous evaluation): None    Precipitating or alleviating factors: None    Therapies tried and outcome: None        Video Start Time: Start: 05/19/2020 10:48 am       Patient is demented 88-year-old male who is living with his son and daughter-in-law who are taking care of him.  Patient is residing in a room in " the cellar of their building with the Internet services is very poor and the daughter-in-law has taken the video visit phone to the patient room briefly where he was sleeping at that time and I have checked the pressure ulcer points on his feet.  Both the son and the daughter-in-law have discussed with me on all the patient's health conditions and the plan of care explained to the caregivers.      Chronic systolic heart failure (H)  This is a new problem for the patient symptoms of worsening pedal edema.     Last Echocardiogram in 2018 showing worsening LVEF at 45% worsening from the past. His AoV  area has been 0.8cm2 which has been worsening from the past. Patient has last seen Cardiology PA in 11/2019 which mentions that - patient and family has refused the recommendations of Pacemaker for his bradycardia and TAVR for his AS. He does have contraindication for OAC for his A.Fib given previous Hx of ICH.     Patient is currently on Digoxin ( normal levels in 04/2020 ) , Metoprolol 12.5 mg daily, Torsemide 10 mg alternating with 15 mg daily. Also on ASA and Statin.    CKD (chronic kidney disease) stage 3, GFR 30-59 ml/min  This is a chronic health problem that is well controlled with current medications and lifestyle measures. Last lab work in 04/2020 has shown Creatinine normalized compared to the past.  But patient has increased dose of torsemide recently given his worsening peripheral edema by cardiology telephone recommendations.    Foot ulcer (H)  This is a new problem started on both feet for last 1 week and has worsening blackish discoloration. Denies any opening or discharge. Pt was not responding and lying on the bed in Cellar. He has dementia and responds very less. Spoke to son Bj for all the history. Denies fever, chills and does endorse some discomfort but no significant pain.     Pressure injury of buttock, unstageable (H)  This is a new problem which patient daughter in law has noticed only today  which is deep and 1 cm diameter left side. Patient is demented and most of the time is lying in bed.       Vascular dementia, uncomplicated  This is a chronic health problem that is uncontrolled with current medications and lifestyle measures. Patient currently on medication Namenda.      Patient Active Problem List   Diagnosis     History of prostate cancer     Short-term memory loss     Atrial fibrillation (H)     Dementia (H)     Mixed hyperlipidemia     Anemia     Cerebral artery occlusion with cerebral infarction (H)     Aortic valve disorder     Mitral regurgitation     LVH (left ventricular hypertrophy)     Intracranial bleed (H)     Sick sinus syndrome (H)     Atrial flutter (H)     Cardiomyopathy (H)     Essential hypertension     CKD (chronic kidney disease) stage 3, GFR 30-59 ml/min (H)     Chronic systolic heart failure (H)     Anemia in chronic kidney disease, unspecified CKD stage     Alcohol dependence (H)     Diarrhea     Dizziness and giddiness     Dysthymic disorder     Edema     Elevated prostate specific antigen (PSA)     Hyposmolality     Malignant neoplasm of prostate (H)     Other specified transient cerebral ischemias     Other stiff joint, of the upper arm     Other tenosynovitis of hand and wrist     Primary localized osteoarthrosis, lower leg     Primary localized osteoarthrosis, upper arm     Primary open angle glaucoma     Pseudophakia, left eye     Pseudophakia, right eye     Senile dementia with delirium (H)     Trigger finger, acquired     Type II diabetes mellitus, uncontrolled (H)     Urinary frequency     Vascular dementia, uncomplicated (H)     Chronic atrial fibrillation     Type 2 diabetes mellitus with diabetic nephropathy, without long-term current use of insulin (H)     Aortic valve stenosis, etiology of cardiac valve disease unspecified     Overweight (BMI 25.0-29.9)     Alcohol abuse-resolved post CVA in 2010     Rectal bleeding     Severe aortic stenosis     Foot ulcer (H)  "    Pressure injury of buttock, unstageable (H)     Past Surgical History:   Procedure Laterality Date     GENITOURINARY SURGERY  2010    Resection of prostate     HEAD & NECK SURGERY  2009    Brain swelling - stopped coumadin but kept on aspirin per cardiology     wisdom teeth removal         Social History     Tobacco Use     Smoking status: Former Smoker     Packs/day: 1.00     Years: 10.00     Pack years: 10.00     Types: Cigarettes     Smokeless tobacco: Never Used   Substance Use Topics     Alcohol use: No     Family History   Problem Relation Age of Onset     Cancer Mother      No Known Problems Father            Reviewed and updated as needed this visit by Provider         Review of Systems   Constitutional, HEENT, cardiovascular, pulmonary, GI, , musculoskeletal, neuro, endocrine and psych systems are negative, except as otherwise noted.      Objective    There were no vitals taken for this visit.  Estimated body mass index is 25.85 kg/m  as calculated from the following:    Height as of 3/2/20: 1.727 m (5' 8\").    Weight as of 5/11/20: 77.1 kg (170 lb).  Physical Exam     GENERAL: Healthy, alert and no distress  EYES: Eyes grossly normal to inspection.  No discharge or erythema, or obvious scleral/conjunctival abnormalities.  RESP: No audible wheeze, cough, or visible cyanosis.  No visible retractions or increased work of breathing.    SKIN: Positive for pressure ulcers on the right and left foot measuring around 3 cm in diameter with black discoloration, no open wounds or erythema per my inspection.  NEURO: Patient has diagnosis and dementia and he was asleep all through the visit.  PSYCH: Couldn't be assessed.      Diagnostic Test Results:  Labs reviewed in Epic          Video-Visit Details    Type of service:  Video Visit    Video End Time:Stop: 05/19/2020 11:14 am     Originating Location (pt. Location): Home    Distant Location (provider location):  [facility]"     Platform used for Video Visit: [employer]      Assessment and Plan  1. Pressure injury of foot, stage 1, unspecified laterality  2. Non-pressure chronic ulcer of unspecified part of left lower leg with other specified severity (H)   3. Pressure injury of buttock, unstageable, unspecified laterality (H)  New problem, uncontrolled.  As mentioned in HPI above and my physical exam findings differential diagnosis of peripheral arterial disease needs to be checked with ultrasound SERAFIN Doppler ordered for this patient.  Also placed home health with wound care referral for this patient as he is bedbound and unable to get out of his room.  Discussed the plan with patient caregivers including his son Bj and also daughter-in-law who understood the plan.  I have answered all their questions.    - Most part of this appointment is taken away by the transit of the video phone to and from the patient -- as there was no Internet connection when the caregiver was going into the cellar to check with the demented and bedridden patient with no audio connection ( Video connection was working ) and patient caregivers should come upstairs to talk to me to answer the questions.  This had happened 4 times in total with a caregiver going upstairs and downstairs for the physical exam of the patient and then to talk to me on the questions asked.  - HOME CARE NURSING REFERRAL  - US SERAFIN Doppler No Excersie 1-2 Levels Bilat Portable; Future      4. Chronic atrial fibrillation  5. Chronic systolic heart failure (H)  6. Severe aortic stenosis  Uncontrolled, recent increase in torsemide dose by the cardiology telephone note, to continue the same dosage at this time.  There is no repeat echocardiogram seen after 2018.  Will recheck the echocardiogram on the severity of his aortic stenosis, please check HPI above for further details.  - Echocardiogram Complete; Future      7. CKD (chronic kidney disease) stage 3, GFR 30-59 ml/min (H)  Uncontrolled, to  avoid nephrotoxins but patient has been taking diuretic with increased dose.  We will recheck his basic metabolic panel last checked in April 2020.  - Basic metabolic panel    8. Vascular dementia, uncomplicated (H)  Chronic problem, uncontrolled.  Patient has been lying on his bed most of the time Which the caregivers say that it is his baseline and he is sleeping at this time.  To continue current Namenda medication.       Patient Instructions     Please keep up the Home health with wound care ordered today as discussed.     Please do the imaging studies given .  Keep up follow up appointments with Cardiology.  ER precautions to be followed if any worsening on the mental status, fever for possible need of IV antibiotics for infection.    ====================================    Patient Education     Treating Pressure Injuries of the Foot    With your healthcare provider s care, hot spots, small cracks, or sores can be treated before they get infected. If infection is already present, your provider will probably prescribe medicine. You may also need surgery if the infection has spread.  Checking your feet  What to look for:    Use a mirror to look at the bottom of your feet each day. By doing so, you can catch small skin changes before they turn into pressure injuries.    Call your healthcare provider if you notice hot spots, red streaks, swelling, or any cracks or sores. Never try to treat corns or calluses yourself.     Check the soles and insides of your shoes before putting them on. Remove any objects, such as mavis.  Improving your overall health  Do your best to control health problems that may affect your feet, such as diabetes and kidney disease. Eat right and exercise. If you are given medicines, take them as directed. If you smoke, stop. Smoking reduces blood flow and slows healing. Limiting alcohol intake may also be helpful.      Cleaning the pressure injuries  To help with healing, your healthcare  provider may clear away the thickened skin around the pressure injury. He or she may put medicated ointment or cream on the injury to prevent infection. Sometimes a special dressing is used to help keep the wound dry.     Reducing force  To take pressure off hot spots and ulcers, your healthcare provider may prescribe orthoses. These custom-made shoe inserts absorb or move pressure from problem areas. You may need special shoes or temporary casts.     Using antibiotics  To control or prevent infection, your healthcare provider may prescribe antibiotics. Take them all, and take them as directed. If you stop using an antibiotic too soon, the infection may come back.     If surgery is needed  You may need surgery if infection enters deep tissues or bone. In such cases, your healthcare provider cleans away the infection while removing as little tissue or bone as possible. You may also be given intravenous (IV) antibiotics to fight the infection.  Date Last Reviewed: 6/1/2016 2000-2019 The [employer]. [address] [place] [address]. All rights reserved. This information is not intended as a substitute for professional medical care. Always follow your healthcare professional's instructions.             Return in about 4 weeks (around 6/16/2020), or if symptoms worsen or fail to improve, for Follow up of last visit.    Venus Copeland MD  [facility]

## 2020-05-19 NOTE — ASSESSMENT & PLAN NOTE
This is a chronic health problem that is well controlled with current medications and lifestyle measures. Last lab work in 04/2020 has shown Creatinine normalized compared to the past.  But patient has increased dose of torsemide recently given his worsening peripheral edema by cardiology telephone recommendations.

## 2020-05-19 NOTE — ASSESSMENT & PLAN NOTE
This is a chronic health problem that is uncontrolled with current medications and lifestyle measures. Patient currently on medication Namenda.

## 2020-05-19 NOTE — ASSESSMENT & PLAN NOTE
This is a new problem started on both feet for last 1 week and has worsening blackish discoloration. Denies any opening or discharge. Pt was not responding and lying on the bed in Cellar. He has dementia and responds very less. Spoke to son Bj for all the history. Denies fever, chills and does endorse some discomfort but no significant pain.

## 2020-05-20 PROBLEM — A41.9 SEPSIS (H): Status: ACTIVE | Noted: 2020-01-01

## 2020-05-20 NOTE — PHARMACY-ADMISSION MEDICATION HISTORY
Pharmacy Medication History  Admission medication history interview status for the 5/20/2020  admission is complete. See EPIC admission navigator for prior to admission medications     Medication history sources: Patient's family/friend (Son Bj who is patient's care giver. )  Medication history source reliability: Good  Adherence assessment: Good    Medication reconciliation completed by provider prior to medication history? No    Time spent in this activity: 20 minutes      Prior to Admission medications    Medication Sig Last Dose Taking? Auth Provider   aspirin 81 MG tablet Take 81 mg by mouth daily. 5/20/2020 at AM Yes Reported, Patient   digoxin (LANOXIN) 125 MCG tablet 1 tablet every other day 5/19/2020 at AM Yes Bj Carter MD   memantine (NAMENDA) 5 (28)-10 (21) MG tablet Take 10 mg twice daily 5/20/2020 at 10 mg AM (takes BID) Yes Manuelito Ansari MD   metoprolol succinate ER (TOPROL-XL) 25 MG 24 hr tablet TAKE 1/2 TABLET BY MOUTH DAILY 5/19/2020 at PM Yes Manuelito Ansari MD   oxybutynin (DITROPAN) 5 MG tablet Take 1 tablet (5 mg) by mouth 2 times daily 5/19/2020 at AM Yes Manuelito Ansari MD   potassium chloride ER (KLOR-CON M) 10 MEQ CR tablet Take 1 tablet every other day, on days when you take 15 mg of torsemide. 5/20/2020 at AM Yes Bj Carter MD   simvastatin (ZOCOR) 20 MG tablet TAKE 1 TABLET BY MOUTH AT BEDTIME 5/19/2020 at PM Yes Manuelito Ansari MD   tamsulosin (FLOMAX) 0.4 MG capsule TAKE 1 CAPSULE BY MOUTH EVERY DAY 5/19/2020 at PM Yes Manuelito Ansari MD   torsemide (DEMADEX) 10 MG tablet 15 mg alternating with 10 mg 5/20/2020 at was to take 15mg today but only got 10mg Yes Bj Carter MD Sharon Chandler, PharmD, BCPS

## 2020-05-20 NOTE — H&P
[facility]    History and Physical - Hospitalist Service       Date of Admission:  5/20/2020    Assessment & Plan   Sylvester Davis is a 88 year old male with advanced dementia and significant cardiac problems who lives with his family at home.  About a week ago he appeared to aspirate and since then has been coughing.  Today a nurse came to evaluate him for possible hospice care and he was found to be hypoxic.  He was sent to the emergency department where he required supplemental oxygen and a chest x-ray showed a dense left lower lobe consolidation.  His family would like him treated with IV fluid and antibiotics but he is no CODE BLUE.    Hypoxic acute respiratory failure   Probable sepsis secondary to left lower lobe pneumonia ?aspiration   Patient likely has aspiration pneumonia although we are going to rule out COVID-19.    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   Hypertension     No further evaluation planned given the patient's overall poor prognosis and possible transition to hospice care    Chronic kidney disease     Repeat BMP tomorrow    Normocytic anemia   Repeat hemoglobin tomorrow    Dementia   Goals of care    If the family has not finalized their decision regarding hospice care, consider consulting palliative care tomorrow     Diet: advance diet as tolerated   DVT Prophylaxis: Enoxaparin (Lovenox) SQ  Gunter Catheter: not present  Code Status: NCB  Rule Out COVID-19 Handoff:  Sylvester is NOT A LOW SUSPICION PUI (needs further investigation).    Follow these instructions:    If COVID test is positive -> continue isolation precautions    If 1st COVID test is negative -> continue isolation  "precautions    -  Order a repeat COVID test to be done 72 hours after the 1st test  -  Place \"PUI Isolation\" nurse communication order  -  Consider ID consult    If 2nd COVID test performed after 72 hours is negative -> consider discontinuing COVID-specific isolation precautions if clinical course atypical for COVID and/or an alternative diagnosis emerges       Disposition Plan   Expected discharge: 2 - 3 days, recommended to hospice setting once antibiotic plan established.  Entered: Brice Villatoro MD 05/20/2020, 3:52 PM     The patient's care was discussed with the Patient.    Brice Villatoro MD  [facility]    ______________________________________________________________________    Chief Complaint   Shortness of breath     History is obtained from the electronic health record and emergency department physician    History of Present Illness   Sylvester Davis is a 88 year old male with dementia.  He lives at home with his family.  His chronic medical problems include severe aortic stenosis, chronic kidney disease, atrial fibrillation and type 2 diabetes.   About a week ago they noted that he appeared to have aspirated.  Since that time he has had a worsening cough.  He had a home nursing visit today as part of an assessment for hospice care and he was found to be hypoxic.  Therefore he was taken to the emergency department to be evaluated.  No reported fever or anyone else sick at home.  In the emergency department his temperature is 98.5  F his blood pressure was 115/80, pulse 107, respiratory rate 40.  His initial ox saturation was 94% than 89% and he was placed on supplemental oxygen.  On exam he was alert and cooperative but appeared to be in mild to moderate distress.  Absent breath sounds are noted at the left lung base.  EKG showed an ectopic atrial tachycardia and a right bundle branch block pattern.  Chest x-ray showed dense opacification of the left lower lobe.  " White count was 15.4 thousand hemoglobin 9.7 g creatinine 1.27 troponin 2.9 lactic acid 2.3 BNP 19,000 digoxin 1.  Blood cultures were drawn and the patient was given 500 mL of normal saline and a dose of IV Zosyn.  ER physician spoke with the patient's family who not want comfort care at this point but wants the patient to be no CODE BLUE and have IV fluid and antibiotics.  They do not want a full evaluation of every abnormal finding.  They will still consider whether the patient should be placed on hospice care.    Review of Systems    Review of systems not obtained due to patient factors - mental status    Past Medical History    I have reviewed this patient's medical history and updated it with pertinent information if needed.   Past Medical History:   Diagnosis Date     Aortic valve disorders     moderate aortic stenosis & 1+ AR per 8/2014 echo     Atrial fibrillation, chronic     Sees cardiologist for meds and testing such as digoxin med/levels      Atrial flutter (H)      Cardiomyopathy (H)     resolved     CHF (congestive heart failure) (H) 10/26/2012     Dementia (H)     chronic memory loss, son saima states he is poa     History of prostate cancer 10/26/2012     Hyperlipidemia LDL goal < 130      Hypertension      Intracranial bleed (H)     while on coumadin     LVH (left ventricular hypertrophy)     mild to moderate per 8/2014 echo     Memory loss, long term 10/26/2012    chronic memory loss, son saima states he is poa     Mitral regurgitation     1+ per 8/2014 echo     Sick sinus syndrome (H)      Unspecified cerebral artery occlusion with cerebral infarction      Urinary retention        Past Surgical History   I have reviewed this patient's surgical history and updated it with pertinent information if needed.  Past Surgical History:   Procedure Laterality Date     GENITOURINARY SURGERY  2010    Resection of prostate     HEAD & NECK SURGERY  2009    Brain swelling - stopped coumadin but kept on aspirin  per cardiology     wisdom teeth removal         Social History   I have reviewed this patient's social history and updated it with pertinent information if needed.  Social History     Tobacco Use     Smoking status: Former Smoker     Packs/day: 1.00     Years: 10.00     Pack years: 10.00     Types: Cigarettes     Smokeless tobacco: Never Used   Substance Use Topics     Alcohol use: No     Drug use: No       Family History   I have reviewed this patient's family history and updated it with pertinent information if needed.   Family History   Problem Relation Age of Onset     Cancer Mother      No Known Problems Father        Prior to Admission Medications   Prior to Admission Medications   Prescriptions Last Dose Informant Patient Reported? Taking?   aspirin 81 MG tablet   Yes No   Sig: Take 81 mg by mouth daily.   digoxin (LANOXIN) 125 MCG tablet   No No   Si tablet every other day   memantine (NAMENDA) 5 (28)-10 (21) MG tablet   No No   Sig: Take 10 mg twice daily   metoprolol succinate ER (TOPROL-XL) 25 MG 24 hr tablet   No No   Sig: TAKE 1/2 TABLET BY MOUTH DAILY   oxybutynin (DITROPAN) 5 MG tablet   No No   Sig: Take 1 tablet (5 mg) by mouth 2 times daily   potassium chloride ER (KLOR-CON M) 10 MEQ CR tablet   No No   Sig: Take 1 tablet every other day, on days when you take 15 mg of torsemide.   simvastatin (ZOCOR) 20 MG tablet   No No   Sig: TAKE 1 TABLET BY MOUTH AT BEDTIME   tamsulosin (FLOMAX) 0.4 MG capsule   No No   Sig: TAKE 1 CAPSULE BY MOUTH EVERY DAY   torsemide (DEMADEX) 10 MG tablet   No No   Sig: 15 mg alternating with 10 mg      Facility-Administered Medications: None     Allergies   Allergies   Allergen Reactions     Fish      Other reaction(s): Edema, Throat Swelling/Closing  Any cold water fish causes allergic reaction.  Cannot tolerate lobster.  Shrimp is OK.     Fish Allergy      Shellfish-Derived Products Difficulty breathing     Coumadin      Warfarin      Other reaction(s): Bleeding        Physical Exam   Vital Signs: Temp: 98.5  F (36.9  C) Temp src: Temporal BP: 115/80 Pulse: 107 Heart Rate: 105 Resp: (!) 37 SpO2: 99 % O2 Device: Nasal cannula Oxygen Delivery: 2 LPM  Weight: 155 lbs 0 oz    Constitutional: not in respiratory distress  Eyes: Lids and lashes normal, pupils equal, round, sclera clear, conjunctiva normal  ENT: Normocephalic, without obvious abnormality, atraumatic  Respiratory: bilateral rhonchi   Cardiovascular: regular rate and rhythm, normal S1 and S2, no S3 or S4, and no murmur noted  GI:  normal bowel sounds, soft, non-distended, non-tender, no masses palpated  Skin: no rashes  Musculoskeletal: There is no redness, warmth, or swelling of the joints.  Full range of motion noted.  Motor strength is 5 out of 5 all extremities bilaterally.  Tone is normal.  Neurologic: Drowsy but arousable.  Speech is slurred he appears generally very weak.  No focal deficits noted.  Handgrip equal on both sides and he was able to move toes in both feet equally.  Neuropsychiatric: General: He does not appear to be oriented-he cannot answer the orientation questions.  Eye contact is poor.    Data   Data reviewed today: I reviewed all medications, new labs and imaging results over the last 24 hours. I personally reviewed the chest x-ray image(s) showing LLL opacfication.    Recent Labs   Lab 05/20/20  1440 05/20/20  1409   WBC 15.4* Canceled, Test credited, specimen discarded   HGB 9.7* Canceled, Test credited, specimen discarded   MCV 90 Canceled, Test credited, specimen discarded    Canceled, Test credited, specimen discarded   NA  --  140   POTASSIUM  --  4.8   CHLORIDE  --  107   CO2  --  26   BUN  --  33*   CR  --  1.27*   ANIONGAP  --  7   TY  --  10.3*   GLC  --  165*   ALBUMIN  --  2.1*   PROTTOTAL  --  7.7   BILITOTAL  --  1.0   ALKPHOS  --  133   ALT  --  19   AST  --  54*   TROPI  --  2.911*     Recent Results (from the past 24 hour(s))   XR Chest Port 1 View    Narrative     CHEST ONE VIEW  5/20/2020 2:52 PM     HISTORY: Cough, shortness of breath.    COMPARISON: July 24, 2018      Impression    IMPRESSION: The lower half of the left hemithorax is now opacified,  this is likely a combination of infiltrate and effusion. Cardiac  silhouette appears generous. No gross right infiltrates, portions of  the right base are obscured by the cardiac silhouette.    FIDE HAYNES MD      Awake/Patient baseline mental status

## 2020-05-20 NOTE — ED NOTES
DATE:  5/20/2020   TIME OF RECEIPT FROM LAB:  1442  LAB TEST:  troponin  LAB VALUE:  2.911  RESULTS GIVEN WITH READ-BACK TO (PROVIDER):  Brice Martini*  TIME LAB VALUE REPORTED TO PROVIDER:   1441

## 2020-05-20 NOTE — PLAN OF CARE
RECEIVING UNIT ED HANDOFF REVIEW    ED Nurse Handoff Report was reviewed by: Ian Fields RN on May 20, 2020 at 5:14 PM

## 2020-05-20 NOTE — ED TRIAGE NOTES
Weakness and SOB for the past 2 days. Difficulty swallowing and probable aspiration for the past week. Lives at home and son is caregiver. Hx of dementia. EMS noted O2 sats to be in the low 80's

## 2020-05-20 NOTE — PROGRESS NOTES
Writer received call from MARVEL Sainz in ED regarding pt and hospice referral. Pt was originally seen by  Home care this AM and family decided they wanted hospice. Pt now in ED and family is not sure they want hospice vs admission to hospital.   Per MARVEL Sainz pt son Bj has decided on 24 hours of IV abx with hospital admission. Writer attempted to call Bj to give him more info on hospice and he did not answer. His VM was also full. Writer will call him again on 5/21/20.    If pt does discharge to home on hospice please send with current comfort meds:  1. Lorazepam 2mg/mL intensol; 0.125-0.250ml (0.25-0.5mg) PO/SL every 4 hours as needed for restlessness, agitation. #30 ml bottle  2. Atropine 1% drops; 2-4 drops SL every 2 hours as needed for oral secretions. #1 bottle  3. Acetaminophen 650mg suppository; insert 1 supp MT every 4 hours as needed for pain/fever. #2  4. Bisacodyl 10mg suppository; insert 1 supp MT twice daily as needed for constipation. #2  5. Hydromorphone 10mg/ml solution; 0.1-0.2mL (1-2mg) every 2 hours as needed for pain/dyspnea. #30 ml  6. Senna 8.6 tabs; 1-2 tabs PO twice daily as needed. #30 tabs  7. Haloperidol 2mg/mL solution; 0.25-0.5mL (0.5-1mg) PO/SL every 6 hours as needed for terminal delirium/severe nausea. #30ml    Thank you,  Inge Rudolph,  Hospice RN  Cell: [phone]

## 2020-05-20 NOTE — ED NOTES
I was asked to assist with this patient who was to sign onto hospice at home today and was sent into the ER as his condition was serious. This patient is not signed onto  hospice but  Liaison is following this patient.  After multiple conversations with ER provider, patients son,  Hospice Liaison and myself it was decided by the ER provider --per the son's wish-- to have this patient admitted to treat the infection this patient has with antibiotics.   hospice Liaison will work with patients son and the CTS team to form a discharge plan with the goal of home with hospice in mind. I have updated the ER staff of the above and completed my consult with this patient.

## 2020-05-20 NOTE — ED PROVIDER NOTES
History     Chief Complaint:  Shortness of Breath      HPI   Sylvester Davis is a 88 year old male with a history of prostate cancer, stroke, CHF, severe aortic stenosis, HTN, HLD, DM2, CKD, Afib who presents to the emergency department for evaluation of shortness of breath.  Patient lives with his son and daughter-in-law.  1 week ago he was noted to have aspiration event and since that time has had progressively worsening cough.  Today, nursing staff was out to the house to assess for hospice care; patient is not formally on hospice care at this time.  During the visit, patient was noted to be hypoxic and was sent to the emergency department for further evaluation and care.  There is been no history of fever or sick contacts.  Patient had deferred TAVR in the past for treatment of his severe aortic stenosis.  He has been receiving his medications as prescribed.  On evaluation, patient has significant dementia and is unable to provide any meaningful history though denies pain. On presentation, he was noted to be mildly tachycardic, afebrile, and mildly hypoxic with oxygen saturations in the upper 80s.    Allergies:  Fish/Shellfish  Coumadin    Medications:    Aspirin  Digoxin  Memantine  Metoprolol  Oxybutynin  Simvastatin  Tamsulosin  Torsemide  Torsemide   Latanoprost  Moxifloxacin  Prednisolone  Ketorolac  Aricept    Past Medical History:    Aortic valve disorders   Atrial fibrillation, chronic   Atrial flutter   Cardiomyopathy  CHF  Dementia   Prostate cancer  HLD  HTN  Intracranial bleed  Left ventricular hypertrophy  Memory loss, long term   Mitral regurgitation   Sick sinus syndrome   Cerebral artery occlusion with cerebral infarction  Urinary retention   Foot ulcer  Pressure injury of buttock  Rectal bleeding   Severe aortic stenosis   DM2  Overweight  Alcohol abuse   Glaucoma   Pseudophakia   Anemia  Mitral regurgitation   Sick sinus syndrome    Alcohol dependence  Dysthymic  disorder   Osteoarthrosis     Past Surgical History:    Prostate resection  HENT surgery  [place] teeth removal  Cataract extraction    Family History:    Mother - cancer    Social History:  Smoking status: Former Smoker  Smokeless tobacco: Never Used  Alcohol use: No  Drug use: No  PCP: Manuelito Ansari  Marital Status:   [2]       Review of Systems   Unable to perform ROS: Dementia       Physical Exam     Patient Vitals for the past 24 hrs:   BP Temp Temp src Pulse Heart Rate Resp SpO2 Height Weight   05/20/20 1553 117/86 -- -- 107 107 -- -- -- --   05/20/20 1500 -- -- -- -- 105 (!) 37 99 % -- --   05/20/20 1400 -- -- -- -- -- -- (!) 89 % -- --   05/20/20 1346 115/80 98.5  F (36.9  C) Temporal 107 -- (!) 40 94 % 1.829 m (6') 70.3 kg (155 lb)       Physical Exam  General: Alert and cooperative with exam. Patient in mild to moderate distress. Baseline mentation significant dementia.   Head:  Scalp is NC/AT  Eyes:  No scleral icterus, PERRL  ENT:  The external nose and ears are normal. The oropharynx is normal and without erythema; mucus membranes are dry.  Neck:  Normal range of motion without rigidity.  CV:  Tachycardic rate and normal rhythm    Moderate systolic murmur   Resp:  Absent breath sounds at left lung base, otherwise clear     Mildly labored breathing with accessory muscle use  GI:  Abdomen is soft, no distension, no tenderness. No peritoneal signs  MS:  No lower extremity edema   Skin:  Warm and dry, No rash or lesions noted.  Neuro: Oriented to person only. History of significant dementia. No gross motor deficits.    Emergency Department Course     ECG:  Time: 1454  Vent. Rate 107 bpm. NM interval 192. QRS duration 146. QT/QTc 364/485. P-R-T axis -55 101 12.  Unusual P axis, possible ectopic atrial tachycardia with occasional premature ventricular complexes.  Right bundle branch block.  Abnormal ECG.  Read time: 1458     Imaging:  Radiology findings were communicated with the patient who  voiced understanding of the findings.    XR Chest Port 1 View   IMPRESSION: The lower half of the left hemithorax is now opacified, this is likely a combination of infiltrate and effusion. Cardiac silhouette appears generous. No gross right infiltrates, portions of the right base are obscured by the cardiac silhouette.  As per radiology.    Laboratory:  Laboratory findings were communicated with the patient who voiced understanding of the findings.    CBC: WBC: 15.4 (H), HGB: 9.7 (L), PLT: 359  CMP: Glucose 165 (H), BUN 33 (H), Creatinine 1.27 (H), GFR Estimate 50 (L), Calcium 10.3 (H), Albumin 2.1 (L), AST 54 (H), o/w WNL    Troponin I (Collected 1409): 2.911 (HH)    Lactic Acid (Resulted 1423): 2.3 (H)    BNP: 19,025 (H)    Digoxin Level: 1.0    Symptomatic COVID-19 Virus (Coronavirus) by PCR: Pending.    Blood Culture x2: Pending    Interventions:  1440 NS Bolus 500 mLs IV  1555 Zosyn 3.375 g IV    Emergency Department Course:  Past medical records, nursing notes, and vitals reviewed.    1340 I performed an exam of the patient as documented above.     EKG obtained in the ED, see results above.   IV was inserted and blood was drawn for laboratory testing, results above.  The patient was sent for a XR while in the emergency department, results above.     1531 I spoke with Dr. Villatoro, hospitalist, who agreed to admit the patient.    Findings and plan explained to the Patient who consents to admission. Discussed the patient with Dr. Villatoro, who will admit the patient to a [facility] bed for further monitoring, evaluation, and treatment.    I personally reviewed the laboratory and imaging results with the Patient and answered all related questions prior to admission.     Impression & Plan     CMS Diagnoses:   The patient has signs of Severe Sepsis      The patient has signs of Severe Sepsis as evidenced by:    1. 2 SIRS criteria, AND  2. Suspected infection, AND   3. Organ dysfunction: Lactic Acid > 2.0    Time severe  sepsis diagnosis confirmed: 1423  05/20/20 as this was the time when Lactate resulted, and the level was > 2.0    3 Hour Severe Sepsis Bundle Completion:    1. Initial Lactic Acid Result:   Recent Labs   Lab Test 05/20/20  1409   LACT 2.3*     2. Blood Cultures before Antibiotics: Yes  3. Broad Spectrum Antibiotics Administered:  yes       Anti-infectives (From admission through now)    Start     Dose/Rate Route Frequency Ordered Stop    05/20/20 1510  piperacillin-tazobactam (ZOSYN) 3.375 g vial to attach to  mL bag      3.375 g  over 1 Hours Intravenous ONCE 05/20/20 1509            4. Fluid volume administered in ED:  500cc, Hx of CHF; full bolus not administered    BMI Readings from Last 1 Encounters:   05/20/20 21.02 kg/m      30 mL/kg fluids based on weight: 2,110 mL  30 mL/kg fluids based on IBW (must be >= 60 inches tall): 2,330 mL                Severe Sepsis reassessment:  1. Repeat Lactic Acid Level: pending  2. MAP>65 after initial IVF bolus, will continue to monitor fluid status and vital signs    I attest to having performed a repeat sepsis exam and assessment of perfusion at 1600 and the results demonstrate improved perfusion.    Covid-19  Sylvester Davis was evaluated during a global COVID-19 pandemic, which necessitated consideration that the patient might be at risk for infection with the SARS-CoV-2 virus that causes COVID-19.   Applicable protocols for evaluation were followed during the patient's care. COVID-19 was considered as part of the patient's evaluation. The plan for testing is:  a test was obtained during this visit.    Medical Decision Making:  Patient is a 88-year-old male with multiple comorbidities including dementia, heart failure, and severe aortic stenosis who presents with one-week history progressively worsening cough and now shortness of breath/hypoxia.  Patient's medical history and records were reviewed.  Initial consideration for, but not limited to, ACS/MI,  infectious process, arrhythmia, electrolyte abnormality, metabolic disturbance, aspiration pneumonia, heart failure exacerbation, PE, among others. Labs, EKG, and imaging was obtained.  Chest x-ray demonstrates infiltrates and effusion in the left lower lobe; high concern for aspiration pneumonia.  Labs notable for elevated white count (15.4), worsening chronic anemia (hemoglobin 9.7), chronic renal insufficiency, significantly elevated troponin (2.911), and elevated BNP (19,025).  EKG without evidence of acute ischemia or infarction.  He is noted to have sinus tachycardia with occasional PVCs and right bundle branch block.  Due to concern for aspiration pneumonia, blood cultures were obtained and patient was provided Zosyn after discussion with the family.  After prolonged discussion with his son and power of  family requests that patient be DNR/DNI but would like to treat possible pulmonary infection.  Family is not interested in pursuing any aggressive cardiac interventions.  BNP and troponin elevation likely secondary to aortic stenosis/underlying heart failure. COVID testing pending. Patient appears clinically dry and was provided 500 cc normal saline.  He will be admitted to the hospitalist service for further evaluation and care.    Diagnosis:    ICD-10-CM    1. Severe sepsis (H)  A41.9     R65.20    2. Acute respiratory failure with hypoxia (H)  J96.01    3. Aspiration pneumonia of left lower lobe, unspecified aspiration pneumonia type (H)  J69.0    4. Elevated troponin  R79.89    5. Severe aortic stenosis  I35.0        Disposition:  Admitted to [facility].    Scribe Disclosure:  Anshul VAELRA, am serving as a scribe at 1:57 PM on 5/20/2020 to document services personally performed by Brice Martini based on my observations and the provider's statements to me.          Brice Martini, DO  05/20/20 1851

## 2020-05-20 NOTE — PROGRESS NOTES
Received phone call from lab, pt tested negative for COVID19. Paged Dr Villatoro, awaiting response.

## 2020-05-20 NOTE — TELEPHONE ENCOUNTER
[facility] now requests orders and shares plan of care/discharge summaries for some patients through [employer].  Please REPLY TO THIS MESSAGE OR ROUTE BACK TO THE AUTHOR in order to give authorization for orders when needed.  This is considered a verbal order, you will still receive a faxed copy of orders for signature.  Thank you for your assistance in improving collaboration for our patients.    PATIENT UPDATE FROM ATTEMPTED START OF CARE TODAY, 5/20/20: PATIENT NOT ADMITTED.    Writer arrived at patients home to find patient in bed in basement bedroom where he lives with adult son who acts as patient's primary caregiver. Patient has advanced dementia and shows delayed response and is lethargic. Vital signs immediately checked and oxygen saturation Found to be 85% on room air. Patient's respiratory rate 26 and pulse 108. Patient is afebrile. Blood pressure is 118/76. Patient denies pain. Patient has a loose, nonproductive cough and lung sounds are clear throughout. Patient's son reports that patient has been declining over the past few weeks and is no longer able to perform steps or ambulate to bathroom. Patient is requiring more extensive assistance with all transfers and requires assist of two to get up to chair. Patient is now incontinent of bowel or bladder and unable to use the restroom. Report patient has chronic dysphasia and has been aspirating on food and fluids frequently. EMS called due to low oxygen saturation. Patient's son and primary caregiver initially questioning bringing father in as he is not able to visit or be with patient in hospital due to COVID 19. Son is considering having patient opened to home hospice, however after evaluation by EMS it is recommended that patient be evaluated in the ER due to breathing difficulty and low oxygen saturation and son agreeable. Patient taken by EMS to ED and not admitted to [facility] at this time. Patient's primary care provider,   Coty updated regarding patient not admitted to homecare and family's wishes for patient to be admitted to hospice on return home. [place] Manager and hospice intake department updated.    Please call with questions/ concerns,  Lisa Resendez RN  [phone]

## 2020-05-20 NOTE — ED NOTES
Bed: ED11  Expected date:   Expected time:   Means of arrival:   Comments:  414  88 M altered/hypoxia/covid concern  1139

## 2020-05-21 NOTE — PLAN OF CARE
Pt alert to self. Repo q 2. Pt transferring to [facility]. Tele A Fib CVR per shift. Plan is to undergo thoracentesis today. Report given to receiving nurse Sarah

## 2020-05-21 NOTE — PROGRESS NOTES
MD Notification    Notified Person: MD    Notified Person Name: MD ESQUIVEL    Notification Date/Time: 5/20/2020 2008    Notification Interaction: Amcom     Purpose of Notification:    Room Obs 19 R.W.    Pt. lactic 2.3    Orders Received:    Paging admitting MD    Comments:

## 2020-05-21 NOTE — PROVIDER NOTIFICATION
MD Notification    Notified Person: MD    Notified Person Name: MD Villatoro    Notification Date/Time: 5/20/2020 2016    Notification Interaction: Amcom    Purpose of Notification:    Room Obs 19 R.W.    Pt. lactic 2.3    Orders Received:    Fluid bolus  Lactic redraw at 0200    Comments:

## 2020-05-21 NOTE — PROGRESS NOTES
Spoke with Dr Villatoro. Bedside RN requested straight cath order for UA and NPO d/t difficulty swallowing. Orders placed.

## 2020-05-21 NOTE — PROGRESS NOTES
MD Notification    Notified Person: MD    Notified Person Name: MD Ndiaye    Notification Date/Time: 5/21/2020 0243    Notification Interaction: Amcom    Purpose of Notification:    Room Obs 19 R.W.    Lactic recheck 2.4    Thanks,  Malia FLORES    Orders Received:  No new orders, continue to monitor    Comments:

## 2020-05-21 NOTE — PLAN OF CARE
DATE & TIME: 5/21/2020 4701-1138    Cognitive Concerns/ Orientation : unable to assess orientation.    BEHAVIOR & AGGRESSION TOOL COLOR: green, calm  CIWA SCORE: na    ABNL VS/O2: vss, on RA, tachycardic low 100s.   MOBILITY: Turn Q 2 with ax2, up with lift.   PAIN MANAGMENT: denied pain.  DIET:  NPO, pending ST eval.   BOWEL/BLADDER: inc of B&B  ABNL LAB/BG: lactic 2.8 today, MD aware, continue with NS at 100 ml/hour.   DRAIN/DEVICES: PIV on right arm, infusing.   TELEMETRY RHYTHM: na   SKIN: Suspected DTI on both heels, pressure injury on coccyx, open wound on left thigh, all preexisting. Wound care consult ordered.   TESTS/PROCEDURES: off unit for US guided thoracentesis at 1440.   D/C DAY/GOALS/PLACE: pending. Son interested in hospice care.   OTHER IMPORTANT INFO: na

## 2020-05-21 NOTE — PROVIDER NOTIFICATION
MD Notification    Notified Person: MD    Notified Person Name: MD Villatoro    Notification Date/Time: 5/20/2020 2130    Notification Interaction:Amcom    Purpose of Notification:    Call regarding pt. Plan of care    Orders Received:    -UA ordered  - NPO ordered placed    Comments:  Writer was in a patient room when MD called back. Charge RN spoke with MD regarding needing NPO orders d/t pt. Unable to shallow without coughing, Needing a straight cath for UA, and Pt. Family member wanting to speak with MD

## 2020-05-21 NOTE — PROGRESS NOTES
[facility]  Hospitalist Progress Note  Sanchez Chakraborty MD  05/21/2020    Assessment & Plan   Sylvester Davis is a 88 year old male with advanced dementia and significant cardiac problems who lives with his family at home.  About a week ago he appeared to aspirate and since then has been coughing.  Today a nurse came to evaluate him for possible hospice care and he was found to be hypoxic.  He was sent to the emergency department where he required supplemental oxygen and a chest x-ray showed a dense left lower lobe consolidation.  His family would like him treated with IV fluid and antibiotics but he is no CODE BLUE.     Hypoxic acute respiratory failure   Probable sepsis secondary to left lower lobe pneumonia ?aspiration   Patient likely has aspiration pneumonia although we are going to rule out COVID-19.    Admit to isolation    Initial COVID-19 PCR is negative    Continue normal saline, lactic acid still elevated and IV Zosyn    Await blood culture results    Speech therapy consultation    Discussed with son, patient is demented and living in the basement for weeks and had no exposure, no fever, cxr not consistent with covid.    I do not believe patient needs another covid 19 test in 72 hours, he was admitted as not a low suspicion but after speaking with son, images and patient not having any exposure, I believe the patient is low risk by his recent history.    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     Normocytic anemia         Repeat hemoglobin is stable     Dementia   Goals of care    If the family has not finalized their decision regarding hospice care, consider consulting palliative care     Consult social work for hospice today telephone     Diet: advance diet as tolerated   DVT Prophylaxis: Enoxaparin (Lovenox) SQ  Gunter Catheter: not present  Code Status: NCB     Disposition Plan    Interval History   -- chart reviewed  -- discussed with son and RN  -- covid 10 negative    -Data reviewed today: I reviewed all new labs and imaging over the last 24 hours. I personally reviewed no images or EKG's today.    Physical Exam   Heart Rate: 106, Blood pressure 134/86, pulse 108, temperature 96.1  F (35.6  C), temperature source Axillary, resp. rate 20, height 1.829 m (6'), weight 71.3 kg (157 lb 1.6 oz), SpO2 94 %.  Vitals:    05/20/20 1346 05/21/20 0136   Weight: 70.3 kg (155 lb) 71.3 kg (157 lb 1.6 oz)     Vital Signs with Ranges  Temp:  [96.1  F (35.6  C)-98.5  F (36.9  C)] 96.1  F (35.6  C)  Pulse:  [107-108] 108  Heart Rate:  [101-108] 106  Resp:  [20-40] 20  BP: (115-138)/(75-86) 134/86  SpO2:  [89 %-99 %] 94 %  I/O's Last 24 hours  I/O last 3 completed shifts:  In: -   Out: 300 [Urine:300]    Constitutional: Arouses, weak, unable to speak, not on oxygen and doesn't appear to be in any respiratory distress  Respiratory: Decreased left lung base  Cardiovascular: tachy, regular, +M  GI: Normal bowel sounds, soft, non-distended, non-tender  Skin/Integumen: No rashes, no cyanosis, no edema  Other:      Medications   All medications were reviewed.    sodium chloride 100 mL/hr at 05/20/20 5319       aspirin  81 mg Oral Daily     digoxin  125 mcg Oral Every Other Day     enoxaparin ANTICOAGULANT  40 mg Subcutaneous Q24H     memantine  10 mg Oral BID     metoprolol succinate ER  12.5 mg Oral Daily     oxybutynin  5 mg Oral BID     piperacillin-tazobactam  3.375 g Intravenous Q6H     simvastatin  20 mg  Oral At Bedtime     sodium chloride (PF)  3 mL Intracatheter Q8H     tamsulosin  0.4 mg Oral Daily        Data   Recent Labs   Lab 05/21/20  0751 05/20/20  1952 05/20/20  1440 05/20/20  1409   WBC 13.4*  --  15.4* Canceled, Test credited, specimen discarded   HGB 9.8*  --  9.7* Canceled, Test credited, specimen discarded   MCV 92  --  90 Canceled, Test credited, specimen discarded    276 359 Canceled, Test credited, specimen discarded     --   --  140   POTASSIUM 4.4  --   --  4.8   CHLORIDE 111*  --   --  107   CO2 22  --   --  26   BUN 32*  --   --  33*   CR 1.03 1.23  --  1.27*   ANIONGAP 9  --   --  7   TY 9.9  --   --  10.3*   GLC 88  --   --  165*   ALBUMIN  --   --   --  2.1*   PROTTOTAL  --   --   --  7.7   BILITOTAL  --   --   --  1.0   ALKPHOS  --   --   --  133   ALT  --   --   --  19   AST  --   --   --  54*   TROPI  --   --   --  2.911*       Recent Results (from the past 24 hour(s))   XR Chest Port 1 View    Narrative    CHEST ONE VIEW  5/20/2020 2:52 PM     HISTORY: Cough, shortness of breath.    COMPARISON: July 24, 2018      Impression    IMPRESSION: The lower half of the left hemithorax is now opacified,  this is likely a combination of infiltrate and effusion. Cardiac  silhouette appears generous. No gross right infiltrates, portions of  the right base are obscured by the cardiac silhouette.    MD Sanchez SOTELO MD  Text Page  (7am to 6pm)

## 2020-05-21 NOTE — PLAN OF CARE
Alert to self. Bedfast. Repo q 2. Tele A Fib RVR. Lungs dim with crackles. Pt needs straw and thickened foods like pudding. Incontinent of bowel and bladder. Plan is to collect UA and labs. MD paged to talk to pt son Bj. Had long conversation with Bj and was told he wanted to put pt on hospice.

## 2020-05-21 NOTE — PLAN OF CARE
Discharge Planner SLP   Patient plan for discharge: did not state, limited responses  Current status: Called pt's son (pt lives with him at baseline): he reports he has had difficulty swallowing for a while - pureed foods/thin liquids at baseline, 1:1 assist, medications crushed with applesauce. Pt lethargic, would not open eyes but responding to ~50% of questions/conversation attempts (per son baseline). Wet breathing quality at rest, improving with upright positioning and spontaneous swallows.     Clinical swallow evaluation completed with thin liquids, nectar thick liquids, honey thick liquids, puree solids. He currently presents with moderate-severe oropharyngeal dysphagia which is likely baseline. Swallow notable for reduced oral awareness/control, suspect premature bolus spillage into pharynx, moderately delayed pharyngeal swallow, sluggish/weak hyolaryngeal ROM to palpation and spontaneous double swallows with all assessed. Reduced control with thin liquids compared to thicker which likely increases aspiration risk identified by wet vocal quality/throat clearing specifically with thin liquids. Occasional mild throat clear with nectar, honey and puree. Pt unable to follow directions to produce stronger throat clear or cough despite max cues.     Recommendations:  - pt judged a high aspiration risk given mod-severe dysphagia, severely reduced awareness and cognitive status - safest recommendations to reduce aspiration would be NPO with frequent oral cares  - if MD and family wish to pursue PO despite risks of aspiration safest possible PO options would be dysphagia diet level 1 (pureed) with NECTAR thick liquids via tsp only, 1:1 assist, medications crushed with puree.     Called son after, educated on results/concern for aspiration, answered all his questions. Given cognitive status, severity of dysphagia do not anticipate rehabilitation will improve swallow function, but SLP to continue to follow 3x/week  (pending hospice/GOC decision) for ongoing PO trials, education, strategy reinforcement.    Barriers to return to prior living situation: dysphagia, aspiration risk   Recommendations for discharge: home with  SLP pending goals of care  Rationale for recommendations: ongoing SLP for dysphagia management and education.        Entered by: Katty Willoughby 05/21/2020 3:47 PM

## 2020-05-21 NOTE — PROGRESS NOTES
Care Suites Procedure Nursing Note    Patient Information  Name: Sylvester Davis  Age: 88 year old    Procedure  Procedure: left lung thoracentesis  Procedure start time: 1455  Procedure complete time: 1458  Concerns/abnormal assessment: verbal consent obtained from son per Dr Young  If abnormal assessment, provider notified: N/A  Plan/Other: Pt O2 sats 86% when sat up for procedure placed on 2 L NC pt vinh proc well 200cc luis fluid drained form left lung.    Bryon Reyes RN

## 2020-05-21 NOTE — PLAN OF CARE
SLP SLP: ST orders initially received on admission, however, are no longer listed as active. Will need active SLP orders.    At the recommendation of the medical team, SLP evaluation is not indicated at this time. Will continue to follow and evaluate pending isolation status.    No documented hx of dysphagia or SLP services. Suspect aspiration risk associated with advanced dementia. Consider starting puree diet/nectar thick liquids, however, will defer diet order to MD. Also note, plans for hospice/comfort care which would liberate diet to regular/thin.     SLP pager: [phone]

## 2020-05-21 NOTE — PROGRESS NOTES
"Clinical Swallow Evaluation:      05/21/20 1607   General Information   Onset Date 05/20/20   Start of Care Date 05/21/20   Referring Physician Palmer   Patient Profile Review/OT: Additional Occupational Profile Info See Profile for full history and prior level of function   Patient/Family Goals Statement did not state   Swallowing Evaluation Bedside swallow evaluation   Behaviorial Observations Lethargic;Confused;Distractible   Mode of current nutrition NPO   Respiratory Status Room air   Comments Per MD \"Sylvester Davis is a 88 year old male with advanced dementia and significant cardiac problems who lives with his family at home.  About a week ago he appeared to aspirate and since then has been coughing.  Today a nurse came to evaluate him for possible hospice care and he was found to be hypoxic.  He was sent to the emergency department where he required supplemental oxygen and a chest x-ray showed a dense left lower lobe consolidation.\" Called pt's son (pt lives with him at baseline): he reports he has had difficulty swallowing for a while - pureed foods/thin liquids at baseline, 1:1 assist, medications crushed with applesauce. Pt lethargic, would not open eyes but responding to ~50% of questions/conversation attempts. Wet breathing quality at rest, improving with upright positioning and spontaneous swallows.    Clinical Swallow Evaluation   Oral Musculature unable to assess due to poor participation/comprehension   Swallow Eval: Clinical Impressions   Skilled Criteria for Therapy Intervention Skilled criteria met.  Treatment indicated.   Functional Assessment Scale (FAS) 2   Treatment Diagnosis mod-severe oropharyngeal dysphagia   Diet texture recommendations NPO   Therapy Frequency 3x/week   Predicted Duration of Therapy Intervention (days/wks) 1 week   Anticipated Discharge Disposition home w/ home health   Risks and Benefits of Treatment have been explained. Yes   Patient, family and/or staff in " agreement with Plan of Care Yes   Clinical Impression Comments Clinical swallow evaluation completed with thin liquids, nectar thick liquids, honey thick liquids, puree solids. He currently presents with moderate-severe oropharyngeal dysphagia which is likely baseline. Swallow notable for reduced oral awareness/control, suspect premature bolus spillage into pharynx, moderately delayed pharyngeal swallow, sluggish/weak hyolaryngeal ROM to palpation and spontaneous double swallows with all assessed. Reduced control with thin liquids compared to thicker which likely increases aspiration risk identified by wet vocal quality/throat clearing specifically with thin liquids. Occasional mild throat clear with nectar, honey and puree. Pt unable to follow directions to produce stronger throat clear or cough despite max cues.    Total Evaluation Time   Total Evaluation Time (Minutes) 39

## 2020-05-21 NOTE — PLAN OF CARE
Unable to assess orientation, pt. Speaks minimal words, intermittently answers yes and no questions.  VSS ex. Tachycardic, weaned to 1 L NC. Per family report pt. Takes pills crushed in applesauce. Pt. was able to take a few bites then began to cough, diet changed to NPO, speech to evaluate. T/R Q2, incontinent. Bladder scanned for 296 Tele: ST with BBB and PAC. Lactic acid 2.3 recheck 2.4, MD notified, no new order. LS: diminished, infrequent productive cough, oral suction provided. No non-verbal indicators of pain. Suspected pressure injury on coccyx, foam applied. Contact and droplet precautions maintained. Continue to monitor

## 2020-05-22 NOTE — PROVIDER NOTIFICATION
MD Notification    Notified Person: MD    Notified Person Name: Dr. Ndiaye    Notification Date/Time: 5/22/2020 0219    Notification Interaction: Paged    Purpose of Notification: FYI: Positive blood culture 5/20/20 right arm, gram positive cocci and clusters.  Patient is on Zosyn.    Orders Received:    Comments:

## 2020-05-22 NOTE — PLAN OF CARE
DATE & TIME: 5/22/2020 9191-3167   Cognitive Concerns/ Orientation : unable to assess orientation.    BEHAVIOR & AGGRESSION TOOL COLOR: green, calm  CIWA SCORE: NA   ABNL VS/O2: VSS, desat to 85% RA at rest, currently on 2L NC sating 95%, tachycardic low 100s.   MOBILITY: Turn Q 2 with ax2, up with lift.   PAIN MANAGMENT: denied pain.  DIET:  NPO recommended, lethargic, frequent cough after trial of pudding with pills, kept NPO until SLP eval.     BOWEL/BLADDER: inc of B&B  ABNL LAB/BG: Na 127, WBC 16.6, Hgb 10.2, BG 72  DRAIN/DEVICES: PIV on right arm, infusing.   TELEMETRY RHYTHM: NA  SKIN: WOC saw the pt, dressings on coccyx and Rt thigh, CDI. Bilat heels open to air, elevated on pillows, turn/repo q2h.   TESTS/PROCEDURES:  D/C DAY/GOALS/PLACE: Today 5/22/20 back home with hospice.  OTHER IMPORTANT INFO: Sepsis alert fired, Lactic acid 1.7.

## 2020-05-22 NOTE — CONSULTS
Care Transition Initial Assessment - SW     Met with: Spoke with SonBj  Active Problems:    Sepsis (H)       DATA  Lives With: child(parvez), adult   Quality of Family Relationships: involved, supportive  Description of Support System: Involved, Supportive  Who is your support system?: Children  Support Assessment: Adequate family and caregiver support.   Identified issues/concerns regarding health management: Pt ready for discharge today. Spoke with son Bj. Pt lives with Bj. Plan is for pt to return home and sign on with  Hospice. jB has already met with hospice. CARLOS updated Zeynep  Hospice liaison. She will call Bj and coordinate paperwork and delivery of hospital bed for today. [facility] [employer] [facility] set for 8118. PCS faxed.       Quality of Family Relationships: involved, supportive     ASSESSMENT  Cognitive Status: Per nursing, unable to assess. Dementia. Family appears involved and supportive.   Concerns to be addressed: None further.     PLAN  Financial costs for the patient includes: None.  Patient given options and choices for discharge: Yes.  Patient/family is agreeable to the plan? YES  Patient Goals and Preferences: discharge home with  Hospice.  Patient anticipates discharging to: home.    KATHY Case, LGSW  [phone]  [facility]

## 2020-05-22 NOTE — PROGRESS NOTES
[facility]  WOC Nurse Inpatient Wound Assessment     Reason for consultation: Evaluate and treat coccyx, heels      Assessment  Coccyx and bilateral heels all have large Unstageable pressure injuries, present on admission.  Nothing looks acutely infected, but wounds will likely evolve and open up further over time.  Noted that family likely considering hospice.   Will keep cares conservative.     Treatment Plan  Coccyx:  Every other day and prn:  1.  Cleanse with MicroKlenz  2.  Skin prep to periwound  3.  Iodosorb gel to wound bed  4.  Mepilex Sacral  5.  PIP measures.  Reposition every 2 hrs side to side.     Heels:  Keep clean and dry.  Offload heels at all times; use Prevalon boots in bed.  Notify WOC if wounds open or worsen.     Orders Written  WOC Nurse follow-up plan: 1-2x week  Nursing to notify the Provider(s) and re-consult the WOC Nurse if wound(s) deteriorates or new skin concern.    Patient History  According to provider note(s):  Sylvester Davis is a 88 year old male with advanced dementia and significant cardiac problems who lives with his family at home.  About a week ago he appeared to aspirate and since then has been coughing.  Today a nurse came to evaluate him for possible hospice care and he was found to be hypoxic.  He was sent to the emergency department where he required supplemental oxygen and a chest x-ray showed a dense left lower lobe consolidation.  His family would like him treated with IV fluid and antibiotics but he is no CODE BLUE.    Objective Data  Containment of urine/stool: Incontinence Protocol and Diaper    Active Diet Order:  Orders Placed This Encounter      Dysphagia Diet Level 1 Pureed Nectar Thickened Liquids (pre-thickened or use instant food thickener)    Output:   I/O last 3 completed shifts:  In: 3165 [I.V.:3165]  Out: -     Risk Assessment:   Sensory Perception: 2-->very limited  Moisture: 3-->occasionally moist  Activity:  2-->chairfast  Mobility: 2-->very limited  Nutrition: 2-->probably inadequate  Friction and Shear: 1-->problem  Adiel Score: 12                          Labs:   Recent Labs   Lab 05/22/20  1040 05/21/20  1256  05/20/20  1409   ALBUMIN  --   --   --  2.1*   HGB 10.2*  --    < > Canceled, Test credited, specimen discarded   INR  --  1.40*  --   --    WBC 16.6*  --    < > Canceled, Test credited, specimen discarded   CRP  --   --   --  138.0*    < > = values in this interval not displayed.       Physical Exam  Skin inspection: focused coccyx, heels    Wound Location:  Coccyx   Date of last photo:  5-22-20      Wound History: present on admission.  Per report, pt has been cared for by family at home; appears was not getting repositioned adequately.  Pt with eyes open but minimally responsive during assessment.  Needs full assist x 2 but is not rigid nor resistant to cares and turning.    Measurements (length x width x depth, in cm):  approx 5 x 5 x 0.2+cm, kind of a butterfly shape across coccyx  Wound Base: 100% slough; mix yellow and brown slick necrotic tissue  Tunneling: N/A  Undermining: N/A  Palpation of the wound bed: normal, not fluctuant at this point  Periwound skin: peely and erythema  Color: darker brown  Temperature: normal   Drainage: moderate  Description of drainage: serosanguinous to creamy/grey  Odor: none  Pain: unable to assess    Wound Location:  Right posterior heel  Date of last photo:  5-22-20      Wound History: present on admission  Measurements (length x width x depth, in cm):  approx 4 x 6.5 x 0+cm  Wound Base: majority is firm dark brown eschar; lateral edge serous boggy blistery tissue; epidermis intact throughout  Tunneling: N/A  Undermining: unknown  Palpation of the wound bed: firm medially and boggy laterally  Periwound skin: intact  Color: normal and consistent with surrounding tissue  Temperature: normal   Drainage: none  Odor: none  Pain: no c/o    Wound Location:  Left posterior  heel  Date of last photo:  5-22-20, see above  Wound History: present on admission  Measurements (length x width x depth, in cm):  approx 4 x 6 x 0+cm     Wound Base: blister with epidermis intact; can see through to a mix of yellow/purulent and serosang drainage under the thick epidermis  Tunneling: N/A  Undermining: unknown  Palpation of the wound bed: boggy   Periwound skin: intact, peely  Color: normal and consistent with surrounding tissue  Temperature: normal   Drainage: none  Odor: none  Pain: no c/o    Interventions  Current support surface: Standard  Atmos Air mattress  Current off-loading measures: Pillows and Prevalon boots  Visual inspection of wound(s) completed  Wound Care: done per plan of care  Supplies: reviewed and gathered  Education provided: importance of repositioning, plan of care, wound progress and Off-loading pressure  Discussed plan of care with Patient and Nurse    Sarah Hodges RN, CWOCN

## 2020-05-22 NOTE — PROGRESS NOTES
Called by nursing staff with report of blood cultures positive for gram positive cocci in clusters, pharmacy to dose Vancomycin orders initiated.

## 2020-05-22 NOTE — DISCHARGE INSTRUCTIONS
Wound care  EVERY OTHER DAY      Comments: Coccyx:  Every other day and prn:   1.  Cleanse with MicroKlenz   2.  Skin prep to periwound   3.  Iodosorb gel to wound bed   4.  Mepilex Sacral   5.  PIP measures.  Reposition every 2 hrs side to side.     Heels:  Keep clean and dry.  Offload heels at all times; use Prevalon boots in bed.  Notify WOC if wounds open or worsen.           Simplythick (Tolstoy consistency)    Per 4 oz (120 mL)   1 pink packet     Per 8 oz (240 mL)  2 pink packet

## 2020-05-22 NOTE — DISCHARGE SUMMARY
[facility]  Discharge Summary        Sylvester Davis MRN# [id number]   YOB: 1931 Age: 88 year old     Date of Admission:  5/20/2020  Date of Discharge:  5/22/2020  Admitting Physician:  Brice Villatoro MD  Discharge Physician: Sanchez Chakraborty MD  Discharging Service: Hospitalist     Primary Provider:  Manuelito Ansari  Primary Care Physician Phone Number: [phone]         Discharge Diagnoses/Problem Oriented Hospital Course (Providers):    Sylvester Davis was admitted on 5/20/2020 by Brice Villatoro MD and I would refer you to their history and physical.  The following problems were addressed during his hospitalization:    Sylvester Davis is a 88 year old male with advanced dementia and significant cardiac problems who lives with his family at home.  About a week ago he appeared to aspirate and since then has been coughing.  Today a nurse came to evaluate him for possible hospice care and he was found to be hypoxic.  He was sent to the emergency department where he required supplemental oxygen and a chest x-ray showed a dense left lower lobe consolidation.  His family would like him treated with IV fluid and antibiotics but he is no CODE BLUE.     Hypoxic acute respiratory failure   Probable sepsis secondary to left lower lobe pneumonia ?aspiration   Patient likely has aspiration pneumonia although we are going to rule out COVID-19.    Admit to isolation    Initial COVID-19 PCR is negative    Continue normal saline, lactic acid still elevated and IV Zosyn    Blood culture negative    Speech therapy consultation    Discussed with son, patient is demented and living in the basement for weeks and had no exposure, no fever, cxr not consistent with covid.    I do not believe patient needs another covid 19 test in 72 hours, he was admitted as not a low suspicion but after speaking with son, images and patient not having any exposure, I  believe the patient is low risk by his recent history.    Proceed with US guide thoracentesis to see if hypoxemia improves    Change from zosyn to augmentin at discharge if patient able to take po    Family interested in hospice, social work consult placed, home with hospice.     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     Normocytic anemia                    Repeat hemoglobin is stable     Dementia   Goals of care    Family finalized their decision regarding hospice care, consider consulting palliative care     Consult social work for hospice             Code Status:      DNR / DNI         Important Results:      GEN NAD  HEENT AT  PULM CTA BUT DIMINISHED LEFT LUNG  CV RRR  GI SOFT +BS  MS NO EDEMA  NEURO NON VERBAL  DERM WARM         Pending Results:        Unresulted Labs Ordered in the Past 30 Days of this Admission     Date and Time Order Name Status Description    5/21/2020 1110 Anaerobic bacterial culture Preliminary     5/21/2020 1110 Fluid Culture Aerobic Bacterial Preliminary     5/20/2020 1509 Blood culture Preliminary     5/20/2020 1509 Blood culture Preliminary                Discharge Instructions and Follow-Up:      Follow-up Appointments     Follow-up and recommended labs and tests       Follow up with  From hospice next week.                  Discharge Disposition:      Discharged to home         Discharge Medications:        Current Discharge Medication List      START taking these medications    Details   acetaminophen (TYLENOL) 325 MG tablet Take 2 tablets (650 mg) by mouth every 4 hours as needed for mild pain or fever  Qty: 100 tablet, Refills: 1    Associated Diagnoses: Alzheimer's  dementia without behavioral disturbance, unspecified timing of dementia onset (H)      acetaminophen (TYLENOL) 650 MG suppository Place 1 suppository (650 mg) rectally every 4 hours as needed for fever or mild pain (Do not exceed 4000 mg total acetaminophen per day.) Unwrap prior to insertion.  Qty: 12 suppository, Refills: 1    Associated Diagnoses: Alzheimer's dementia without behavioral disturbance, unspecified timing of dementia onset (H)      amoxicillin-clavulanate (AUGMENTIN) 875-125 MG tablet Take 1 tablet by mouth 2 times daily for 7 days  Qty: 14 tablet, Refills: 0    Associated Diagnoses: Acute respiratory failure with hypoxia (H)      atropine 1 % ophthalmic solution Take 2 drops by mouth, place under tongue or place inside cheek every 2 hours as needed for other (terminal respiratory secretions) Not for ophthalmic use.  Qty: 5 mL, Refills: 1    Associated Diagnoses: Alzheimer's dementia without behavioral disturbance, unspecified timing of dementia onset (H)      bisacodyl (DULCOLAX) 10 MG suppository Unwrap and insert 1 suppository rectally twice daily as needed for constipation.  Qty: 12 suppository, Refills: 1    Associated Diagnoses: Alzheimer's dementia without behavioral disturbance, unspecified timing of dementia onset (H)      HYDROmorphone, HIGH CONC, (DILAUDID) 10 mg/mL LIQD oral Take 0.2 mLs (2 mg) by mouth or place under tongue every 2 hours as needed for moderate to severe pain (and/or  shortness of breath)  Qty: 30 mL, Refills: 0    Associated Diagnoses: Alzheimer's dementia without behavioral disturbance, unspecified timing of dementia onset (H)      LORazepam (ATIVAN) 0.5 MG tablet Take 1 tablet (0.5 mg) by mouth, place under tongue or insert rectally every 4 hours as needed for anxiety (restlessness)  Qty: 30 tablet, Refills: 1    Associated Diagnoses: Alzheimer's dementia without behavioral disturbance, unspecified timing of dementia onset (H)      MEDICATION INSTRUCTION If care  facility cannot accept or use ranges, facility is instructed to use lower end of dosing range  Qty:      Associated Diagnoses: Alzheimer's dementia without behavioral disturbance, unspecified timing of dementia onset (H)      sennosides (SENOKOT) 8.6 MG tablet Take 2 tablets by mouth 2 times daily  Qty: 100 tablet, Refills: 1    Associated Diagnoses: Alzheimer's dementia without behavioral disturbance, unspecified timing of dementia onset (H)         CONTINUE these medications which have NOT CHANGED    Details   aspirin 81 MG tablet Take 81 mg by mouth daily.      digoxin (LANOXIN) 125 MCG tablet 1 tablet every other day  Qty: 45 tablet, Refills: 3    Associated Diagnoses: Chronic systolic heart failure (H)      memantine (NAMENDA) 5 (28)-10 (21) MG tablet Take 10 mg twice daily  Qty: 180 tablet, Refills: 3    Associated Diagnoses: Dementia without behavioral disturbance, unspecified dementia type (H)      metoprolol succinate ER (TOPROL-XL) 25 MG 24 hr tablet TAKE 1/2 TABLET BY MOUTH DAILY  Qty: 45 tablet, Refills: 3    Associated Diagnoses: Essential hypertension      oxybutynin (DITROPAN) 5 MG tablet Take 1 tablet (5 mg) by mouth 2 times daily  Qty: 180 tablet, Refills: 3    Associated Diagnoses: History of prostate cancer      potassium chloride ER (KLOR-CON M) 10 MEQ CR tablet Take 1 tablet every other day, on days when you take 15 mg of torsemide.  Qty: 45 tablet, Refills: 1    Associated Diagnoses: Chronic systolic heart failure (H)      simvastatin (ZOCOR) 20 MG tablet TAKE 1 TABLET BY MOUTH AT BEDTIME  Qty: 90 tablet, Refills: 2    Associated Diagnoses: Hyperlipidemia LDL goal <130      tamsulosin (FLOMAX) 0.4 MG capsule TAKE 1 CAPSULE BY MOUTH EVERY DAY  Qty: 90 capsule, Refills: 3    Associated Diagnoses: History of prostate cancer      torsemide (DEMADEX) 10 MG tablet 15 mg alternating with 10 mg  Qty: 135 tablet, Refills: 1    Associated Diagnoses: Chronic systolic heart failure (H)                   Allergies:         Allergies   Allergen Reactions     Fish      Other reaction(s): Edema, Throat Swelling/Closing  Any cold water fish causes allergic reaction.  Cannot tolerate lobster.  Shrimp is OK.     Fish Allergy      Shellfish-Derived Products Difficulty breathing     Coumadin      Warfarin      Other reaction(s): Bleeding            Consultations This Hospital Stay:      Consultation during this admission received from hospice          Discharge Orders       After Care Instructions     Activity      Your activity upon discharge: activity as tolerated         Diet      Follow this diet upon discharge: Orders Placed This Encounter      Dysphagia Diet Level 1 Pureed Nectar Thickened Liquids (pre-thickened or use instant food thickener)                    Discharge Time:      Less than 30 minutes.        Image Results From This Hospital Stay (For Non-EPIC Providers):        Results for orders placed or performed during the hospital encounter of 05/20/20   XR Chest Port 1 View    Narrative    CHEST ONE VIEW  5/20/2020 2:52 PM     HISTORY: Cough, shortness of breath.    COMPARISON: July 24, 2018      Impression    IMPRESSION: The lower half of the left hemithorax is now opacified,  this is likely a combination of infiltrate and effusion. Cardiac  silhouette appears generous. No gross right infiltrates, portions of  the right base are obscured by the cardiac silhouette.    FIDE HAYNES MD   US Thoracentesis    Narrative    EXAM:  1. LEFT THORACENTESIS  2. ULTRASOUND GUIDANCE  LOCATION: [facility]  DATE/TIME: 5/21/2020 3:08 PM    INDICATION: Pleural effusion.    PROCEDURE: Informed consent obtained. Time out performed. The abdomen  was prepped and draped in a sterile fashion. 10 mL of 1% lidocaine was  infused into local soft tissues. A 5 Icelandic catheter system was  introduced into the pleural effusion under ultrasound guidance.    0.2 liters of clear fluid were removed and sent to lab if  requested.    Patient tolerated procedure well.    Ultrasound images have been permanently captured for documentation.       Impression    IMPRESSION:  Status post ultrasound-guided left thoracentesis. Note, there was only  a small pleural effusion which was completely aspirated. Much of the  opacity on chest x-ray is due to consolidated lung.    LEOLA ENCARNACION MD           Most Recent Lab Results In EPIC (For Non-EPIC Providers):    Most Recent 3 CBC's:  Recent Labs   Lab Test 05/22/20  1040 05/21/20  0751 05/20/20 1952 05/20/20  1440   WBC 16.6* 13.4*  --  15.4*   HGB 10.2* 9.8*  --  9.7*   MCV 91 92  --  90    288 276 359      Most Recent 3 BMP's:  Recent Labs   Lab Test 05/22/20  1040 05/21/20  0751 05/20/20 1952 05/20/20  1409   * 142  --  140   POTASSIUM 4.4 4.4  --  4.8   CHLORIDE 117* 111*  --  107   CO2 23 22  --  26   BUN 27 32*  --  33*   CR 1.12 1.03 1.23 1.27*   ANIONGAP 7 9  --  7   TY 10.9* 9.9  --  10.3*   GLC 79 88  --  165*     Most Recent 3 Troponin's:  Recent Labs   Lab Test 05/20/20  1409   TROPI 2.911*     Most Recent 3 INR's:  Recent Labs   Lab Test 05/21/20  1256   INR 1.40*     Most Recent 2 LFT's:  Recent Labs   Lab Test 05/20/20  1409 04/16/20  1150   AST 54* 24   ALT 19 17   ALKPHOS 133 115   BILITOTAL 1.0 2.0*     Most Recent Cholesterol Panel:  Recent Labs   Lab Test 10/14/19  1448   CHOL 119   LDL 50   HDL 36*   TRIG 166*     Most Recent 6 Bacteria Isolates From Any Culture (See EPIC Reports for Culture Details):  Recent Labs   Lab Test 05/21/20  1500 05/20/20  1440 05/20/20  1409 05/25/12  1402   CULT Culture negative monitoring continues  Culture negative monitoring continues No growth after 2 days Cultured on the 2nd day of incubation:  Gram positive cocci in clusters  *  Critical Value/Significant Value, preliminary result only, called to and read back by  Alex Baker RN at 0218 5/22/20 HG    (Note)  POSITIVE for Staphylococci other than S.aureus,  S.epidermidis and  S.lugdunensis, by Tunezyigene multiplex nucleic acid test.  Coagulase-negative staphylococci are the most common venipuncture or  collection associated skin CONTAMINANTS grown in blood cultures.  Final identification and antimicrobial susceptibility testing will be  verified by standard methods.    Specimen tested with Verigene multiplex, gram-positive blood culture  nucleic acid test for the following targets: Staph aureus, Staph  epidermidis, Staph lugdunensis, other Staph species, Enterococcus  faecalis, Enterococcus faecium, Streptococcus species, S. agalactiae,  S. anginosus grp., S. pneumoniae, S. pyogenes, Listeria sp., mecA  (methicillin resistance) and Rudy/B (vancomycin resistance).    Critical Value/Significant Value called to and read back by Aj Claire RN at 0453 5/22/20 HG.    >100,000 colonies/mL Multiple species present, probable contamination     Most Recent TSH, T4 and HgbA1c:  Recent Labs   Lab Test 04/16/20  1150 10/14/19  1448   TSH  --  2.42   A1C 5.3 5.3

## 2020-05-22 NOTE — PHARMACY-VANCOMYCIN DOSING SERVICE
Pharmacy Vancomycin Initial Note  Date of Service May 22, 2020  Patient's  12/3/1931  88 year old, male    Indication: Bacteremia    Current estimated CrCl = Estimated Creatinine Clearance: 54.4 mL/min (based on SCr of 1.03 mg/dL).    Creatinine for last 3 days  2020:  2:09 PM Creatinine 1.27 mg/dL;  7:52 PM Creatinine 1.23 mg/dL  2020:  7:51 AM Creatinine 1.03 mg/dL    Recent Vancomycin Level(s) for last 3 days  No results found for requested labs within last 72 hours.      Vancomycin IV Administrations (past 72 hours)      No vancomycin orders with administrations in past 72 hours.                Nephrotoxins and other renal medications (From now, onward)    Start     Dose/Rate Route Frequency Ordered Stop    20 0300  vancomycin 1500 mg in 0.9% NaCl 250 ml intermittent infusion 1,500 mg      1,500 mg  over 90 Minutes Intravenous EVERY 12 HOURS 20 0235      20 2200  piperacillin-tazobactam (ZOSYN) 3.375 g vial to attach to  mL bag      3.375 g  over 1 Hours Intravenous EVERY 6 HOURS 20 1743            Contrast Orders - past 72 hours (72h ago, onward)    None                Plan:  1.  Start vancomycin  1500 mg IV q12h.   2.  Goal Trough Level: 15-20 mg/L   3.  Pharmacy will check trough levels as appropriate in 1-3 Days.    4. Serum creatinine levels will be ordered daily for the first week of therapy and at least twice weekly for subsequent weeks.    5. [place] method utilized to dose vancomycin therapy: Method 1    Cruz Schafer [facility]

## 2020-05-22 NOTE — PLAN OF CARE
DATE & TIME: 5/22/2020    Cognitive Concerns/ Orientation : unable to assess orientation.    BEHAVIOR & AGGRESSION TOOL COLOR: green, calm  CIWA SCORE: NA   ABNL VS/O2: VSS on RA, tachycardic low 100s.   MOBILITY: Turn Q 2 with ax2, up with lift.   PAIN MANAGMENT: denied pain.  DIET:  NPO recommended, ok for DDI with Nectars, refused oral intake.    BOWEL/BLADDER: inc of B&B  ABNL LAB/BG: lactic 2.8 today, MD aware, continue with NS at 100 ml/hour. Positive blood culture 5/20/20 right arm, gram positive cocci and clusters/ growing a staph species. MD aware, started on Vanco, pharmacy to dose.   DRAIN/DEVICES: PIV on right arm, infusing.   TELEMETRY RHYTHM: NA  SKIN: Suspected DTI on both heels, pressure injury on coccyx, open wound on left thigh, all preexisting. Wound care consult ordered.   TESTS/PROCEDURES: US guided thoracentesis yesterday.   D/C DAY/GOALS/PLACE: pending. Son interested in hospice care.   OTHER IMPORTANT INFO: Patient refused to take any oral medications.

## 2020-05-22 NOTE — PLAN OF CARE
Discharge    Patient discharged to home via stretcher with [facility] transport  Care plan note  DATE & TIME: 5/22/2020 9634-0608  Cognitive Concerns/ Orientation : unable to assess orientation.    BEHAVIOR & AGGRESSION TOOL COLOR: green, calm  CIWA SCORE: NA        ABNL VS/O2: VSS, desat to 85% RA at rest, currently on 2L NC sating 95%, tachycardic low 100s.   MOBILITY: Turn Q 2 with ax2, up with lift.   PAIN MANAGMENT: denied pain.  DIET:  NPO recommended, lethargic, frequent cough after trial of pudding with pills, kept NPO until SLP eval.     BOWEL/BLADDER: inc of B&B  ABNL LAB/BG: Na 127, WBC 16.6, Hgb 10.2, BG 72  DRAIN/DEVICES: PIV on right arm removed.   TELEMETRY RHYTHM: NA  SKIN: WOC saw the pt, dressings on coccyx and Rt thigh, CDI. Bilat heels open to air, elevated on pillows, turn/repo q2h.   TESTS/PROCEDURES:  D/C DAY/GOALS/PLACE: discharged back home today 5/22/20 at 1820, signed with  hospice. [facility] via stretcher  OTHER IMPORTANT INFO: Sepsis alert fired, Lactic acid 1.7. Went through AVS over the phone with pt's son, verbalized understanding. Belongings and home medication sent with the pt.        Listed belongings gathered and returned to patient. Yes  Care Plan and Patient education resolved: Yes  Prescriptions if needed, hard copies sent with patient  NA  Home and hospital acquired medications returned to patient: NA  Medication Bin checked and emptied on discharge Yes  Follow up appointment made for patient: Yes

## 2020-05-24 LAB
BACTERIA SPEC CULT: ABNORMAL
SPECIMEN SOURCE: ABNORMAL

## 2020-05-26 ENCOUNTER — PATIENT OUTREACH (OUTPATIENT)
Dept: CARE COORDINATION | Facility: CLINIC | Age: 85
End: 2020-05-26

## 2020-05-26 LAB
BACTERIA SPEC CULT: NO GROWTH
BACTERIA SPEC CULT: NO GROWTH
SPECIMEN SOURCE: NORMAL
SPECIMEN SOURCE: NORMAL

## 2020-05-26 NOTE — PROGRESS NOTES
Clinic Care Coordination Contact  [facility]/Voicemail       Clinical Data: Care Coordinator Outreach  Outreach attempted x 1. Could not leave a voicemail message for patient with call back information and requested return call due to voicemail box was full.  Plan: Care Coordinator will reach out to  patient again in 1-2 business days.    ANGI Rosenberg  Clinic Care Coordination  [facility] Clinics: [facility], [facility], and [facility]  Phone: [phone]

## 2020-05-28 LAB
BACTERIA SPEC CULT: NORMAL
Lab: NORMAL
SPECIMEN SOURCE: NORMAL

## 2020-05-29 ENCOUNTER — TELEPHONE (OUTPATIENT)
Dept: FAMILY MEDICINE | Facility: CLINIC | Age: 85
End: 2020-05-29

## 2020-05-29 NOTE — TELEPHONE ENCOUNTER
Spoke with pilar Cummings.     Patient passed aware on May 23rd and the death certificate still needs to be signed. Looking to have this done ASAP.     Since PCP is not in office today she asked for covering provider to sign off on this. This was sent to Dr. Carter, who completed a virtual visit with patient 4/16/2020

## 2020-06-16 NOTE — TELEPHONE ENCOUNTER
Zoila called again. She states that the death certificate that was signed by Dr. Carter was fine but showed an error on her end stating they needed it to be sent back to [place] (medical examiner?). Dr. Carter was out so they are requesting Dr. Ansari to sign again.    Please route back to pt care team 1 when done, so we can call Zoila and let her know that this was done. Thanks!

## 2022-02-25 NOTE — PATIENT INSTRUCTIONS
1.  Weight Loss Tips  1. Do not eat after 6 hrs before your expected bedtime  2. Have your heaviest meal for breakfast, a slightly lighter meal at lunch and a snack 6 hrs before bed  3. No sugar/calorie drinks except milk ie no fruit juice, pop, alcohol.  4. Drink milk 30min before meals to decrease your hunger. Also it is excellent as part of your last meal of the day snack  5. Drink lots of water  6. Increase fiber in diet: all bran cereal, salads, popcorn etc  7. Have only one small serving of fruit a day about 1/2 cup (as this is high in sugar)  8. EXERCISE is the bottom line. Without it, you will gain weight even on a low calorie diet. Best if done 2-3X a day as can    Being overweight contributes to high blood pressure and high cholesterol, both of which cause heart attacks, strokes and kidney failure, prediabetes and diabetes, arthritis, and liver disease     3. Keep the injured area above the level of the heart as much as possible to help decrease the pain and  the healing time . Put pillows on either side while sleeping to keep the arm or leg elevated     4. Please return in 2 days     5/.  Shingrex is a 2 shot series that prevents shingles 97% of the time, as opposed to the old shingles shot that only prevented it at 40-50%  It costs less for medicare at a pharmacy  You should get it starting at 50 yrs old    get at the drug store : or can give in 2 days on return    1. shingrex   2. Tdap    PAST SURGICAL HISTORY:  H/O colonoscopy

## 2022-11-22 NOTE — ED NOTES
----- Message from Sandi Cotto MA sent at 10/11/2022 12:37 PM CDT -----  Regarding: FW: medicine refill/sally  NOV: 10/26/22  CAN: 8/22/22, 9/29/22  Last filled amlodipine 6/22/22  Last filled simvastatin 8/12/22  ----- Message -----  From: Amiat De La Cruz  Sent: 10/11/2022  11:35 AM CDT  To: Sally NICOLE Staff  Subject: medicine refill/sally                           Pt is needing refills on amlodipine, simvastatin.   [employer].        [facility]  ED Nurse Handoff Report    ED Chief complaint: Shortness of Breath      ED Diagnosis:   Final diagnoses:   None       Code Status: DNR / DNI    Allergies:   Allergies   Allergen Reactions     Fish      Other reaction(s): Edema, Throat Swelling/Closing  Any cold water fish causes allergic reaction.  Cannot tolerate lobster.  Shrimp is OK.     Fish Allergy      Shellfish-Derived Products Difficulty breathing     Coumadin      Warfarin      Other reaction(s): Bleeding       Patient Story: Pt presents to ED from home with son/daughter-in-law due to increased cough and SOB.  Pt has hx of aspirating 1 week ago.  Pt has hx of severe aortic stenosis.    Focused Assessment:  Pt was tachycardic upon initial assessment, hypoxic in upper 80% on RA.  Improves after being placed on 4L BNC.  Pt currently on 2L BNC with sats at 97%.  Pt has hx of dementia, confused at this time.  Pt very drowsy, does not fully respond to voice.   states that dependent on patient's response to fluids/IV Abx, it will be determined if he should go on hospice.  Pt was a difficult IV start, needed 3 attempts with US IV start.  Lactic acid elevated, troponin elevated, BNP elevated, kidney function poor.     Treatments and/or interventions provided: see MAR  Patient's response to treatments and/or interventions: responded well.    To be done/followed up on inpatient unit:  n/a    Does this patient have any cognitive concerns?: Baseline dementia    Activity level - Baseline/Home:  Stand with assist x2  Activity Level - Current:   Stand with assist x2    Patient's Preferred language: English   Needed?: No    Isolation: None and Other: COVID precautions  Infection: Not Applicable  COVID swab pendin  Bariatric?: No    Vital Signs:   Vitals:    05/20/20 1346 05/20/20 1400 05/20/20 1500 05/20/20 1553   BP: 115/80   117/86   Pulse: 107   107   Resp: (!) 40  (!) 37    Temp: 98.5  F (36.9  C)      TempSrc:  Temporal      SpO2: 94% (!) 89% 99%    Weight: 70.3 kg (155 lb)      Height: 1.829 m (6')          Cardiac Rhythm:Cardiac Rhythm: Sinus tachycardia    Was the PSS-3 completed:   Yes  What interventions are required if any?               Family Comments: n/a  OBS brochure/video discussed/provided to patient/family: N/A              Name of person given brochure if not patient: n/a              Relationship to patient: n/a    For the majority of the shift this patient's behavior was Green.   Behavioral interventions performed were tlc.    ED NURSE PHONE NUMBER: *[phone]

## 2025-05-23 NOTE — PROGRESS NOTES
"Clinic Care Coordination Contact      CC CHW reviewed patient chart; patient's status states \"\".  Huddled with CC RN who reviewed patient's [facility] chart which notes patient passed away on 20.  No further Care Coordination outreaches will be made.       ANGI Rosenberg  Clinic Care Coordination  [facility]: [facility], and [facility]  Phone: [phone]  " Sheath removed intact.